# Patient Record
Sex: MALE | Race: BLACK OR AFRICAN AMERICAN | Employment: UNEMPLOYED | URBAN - METROPOLITAN AREA
[De-identification: names, ages, dates, MRNs, and addresses within clinical notes are randomized per-mention and may not be internally consistent; named-entity substitution may affect disease eponyms.]

---

## 2017-10-11 ENCOUNTER — HOSPITAL ENCOUNTER (EMERGENCY)
Age: 37
Discharge: ANOTHER HOSPITAL | End: 2017-10-13
Attending: EMERGENCY MEDICINE | Admitting: EMERGENCY MEDICINE
Payer: SELF-PAY

## 2017-10-11 DIAGNOSIS — F32.A DEPRESSION, UNSPECIFIED DEPRESSION TYPE: Primary | ICD-10-CM

## 2017-10-11 LAB
ALBUMIN SERPL-MCNC: 4 G/DL (ref 3.4–5)
ALBUMIN/GLOB SERPL: 0.9 {RATIO} (ref 0.8–1.7)
ALP SERPL-CCNC: 108 U/L (ref 45–117)
ALT SERPL-CCNC: 36 U/L (ref 16–61)
AMPHET UR QL SCN: NEGATIVE
ANION GAP SERPL CALC-SCNC: 8 MMOL/L (ref 3–18)
AST SERPL-CCNC: 12 U/L (ref 15–37)
BARBITURATES UR QL SCN: NEGATIVE
BASOPHILS # BLD: 0 K/UL (ref 0–0.1)
BASOPHILS NFR BLD: 0 % (ref 0–2)
BENZODIAZ UR QL: NEGATIVE
BILIRUB SERPL-MCNC: 0.7 MG/DL (ref 0.2–1)
BUN SERPL-MCNC: 15 MG/DL (ref 7–18)
BUN/CREAT SERPL: 19 (ref 12–20)
CALCIUM SERPL-MCNC: 8.9 MG/DL (ref 8.5–10.1)
CANNABINOIDS UR QL SCN: NEGATIVE
CHLORIDE SERPL-SCNC: 108 MMOL/L (ref 100–108)
CO2 SERPL-SCNC: 25 MMOL/L (ref 21–32)
COCAINE UR QL SCN: NEGATIVE
CREAT SERPL-MCNC: 0.79 MG/DL (ref 0.6–1.3)
DIFFERENTIAL METHOD BLD: ABNORMAL
EOSINOPHIL # BLD: 0 K/UL (ref 0–0.4)
EOSINOPHIL NFR BLD: 1 % (ref 0–5)
ERYTHROCYTE [DISTWIDTH] IN BLOOD BY AUTOMATED COUNT: 12.8 % (ref 11.6–14.5)
ETHANOL SERPL-MCNC: <3 MG/DL (ref 0–3)
GLOBULIN SER CALC-MCNC: 4.3 G/DL (ref 2–4)
GLUCOSE SERPL-MCNC: 87 MG/DL (ref 74–99)
HCT VFR BLD AUTO: 46.3 % (ref 36–48)
HDSCOM,HDSCOM: NORMAL
HGB BLD-MCNC: 16 G/DL (ref 13–16)
LYMPHOCYTES # BLD: 1.4 K/UL (ref 0.9–3.6)
LYMPHOCYTES NFR BLD: 19 % (ref 21–52)
MCH RBC QN AUTO: 28.5 PG (ref 24–34)
MCHC RBC AUTO-ENTMCNC: 34.6 G/DL (ref 31–37)
MCV RBC AUTO: 82.5 FL (ref 74–97)
METHADONE UR QL: NEGATIVE
MONOCYTES # BLD: 0.6 K/UL (ref 0.05–1.2)
MONOCYTES NFR BLD: 8 % (ref 3–10)
NEUTS SEG # BLD: 5.3 K/UL (ref 1.8–8)
NEUTS SEG NFR BLD: 72 % (ref 40–73)
OPIATES UR QL: NEGATIVE
PCP UR QL: NEGATIVE
PLATELET # BLD AUTO: 271 K/UL (ref 135–420)
PMV BLD AUTO: 9.6 FL (ref 9.2–11.8)
POTASSIUM SERPL-SCNC: 3.3 MMOL/L (ref 3.5–5.5)
PROT SERPL-MCNC: 8.3 G/DL (ref 6.4–8.2)
RBC # BLD AUTO: 5.61 M/UL (ref 4.7–5.5)
SODIUM SERPL-SCNC: 141 MMOL/L (ref 136–145)
WBC # BLD AUTO: 7.4 K/UL (ref 4.6–13.2)

## 2017-10-11 PROCEDURE — 80053 COMPREHEN METABOLIC PANEL: CPT | Performed by: EMERGENCY MEDICINE

## 2017-10-11 PROCEDURE — 80307 DRUG TEST PRSMV CHEM ANLYZR: CPT | Performed by: EMERGENCY MEDICINE

## 2017-10-11 PROCEDURE — 85025 COMPLETE CBC W/AUTO DIFF WBC: CPT | Performed by: EMERGENCY MEDICINE

## 2017-10-11 PROCEDURE — 99285 EMERGENCY DEPT VISIT HI MDM: CPT

## 2017-10-11 NOTE — BSMART NOTE
COMPREHENSIVE ASSESSMENT FORM PART 1    SECTION I - DISPOSITION  Patient was discussed with Dr. Devang Kidd while patient was in Bed 21 of the ER. Patient has been moved to ER Bed 17. SECTION II - INTEGRATED SUMMARY  CHIEF COMPLAINT:  Patient is a 30year old male who presents to the Emergency Room for a crisis evaluation. Patient complains of depression and suicidal ideations with a plan to overdose on his sister's medications. He states he has been battling depressive thoughts for several years. His thoughts evolve around life stressors. He lost his job in [place], [place], about four years ago. He and his wife and two children (2 year old and 18 month old) now live with his parents and his sister who suffers from a two (2) TBIs. Recently he lost his job at [employer] and also a  job. Additionally, patient states he has been [place] cheating on his wife by texting\" a woman (\"a friend of a friend of a friend\") using social media. His wife found out about this yesterday. Patient admits that he has \"mentally cheated\" on his wife in the past which caused his wife to leave him. He is upset because he feels his parents will berate him and they will \"rape me verbally for 45 minutes to an hour\" when they find out about this current situation. He states, \"I don't want to hear with they have to say right now. \"    Today he was discussing his concerns with his /counselors who felt he was in crisis. They in turn called the police to his home. Upon arrival, he was then transported to [facility] ER. SI / SELF HARM HX / HI:  He attempted suicide in the past by lay down in the middle of the road at his college campus, about 12 years ago. A passerby \"talked him out of it. \"  No HI. HALLUCINATIONS (AUDITORY/VISUAL/TACTILE/OLFACTORY):  Denies all. FAMILY HX OF MENTAL ILLNESS/SUBSTANCE ABUSE:  Sister has had two traumatic brain injuries and now suffers from an unknown mental illness.     PRIOR TREATMENT HX:  INPATIENT:   Denied. OUTPATIENT:   Denied. MEDICAL HISTORY:  Current medical concerns -  Denies having any medical issues. Current medications/Medication History:  Does not take any medication. [address]  Patient is dressed in hospital paper scrubs and slip resistant socks. His speech is rapid. He requires redirection to stay on topic. His/Her affect is calm and cooperative while responding to questions asked. He is alert and oriented to person, place, time and situation. **  Patient will be referred to the [facility] ([facility]) for further assessment and evaluation for a possible Crisis Stabilization Unit and/or other facility admission.       Arron Diaz, AYSE, BSN

## 2017-10-11 NOTE — ED PROVIDER NOTES
HPI Comments: 5:03 PM Aakash Baca is a 30 y.o. male who presents to the ED w/ police escort c/o SI that began today w/ a plan to overdose on a family member's prescription medications. The patient states that he emotionally cheated on his wife and that his wife and family found out. He states that his family was out of the house this afternoon and he was \"dreading their return\" because they were going to lecture him. He explains that he decided not to overdose because he sent a message to his  who talked him out of it and called the police. The patient denies a history of depression and other psychiatric disorders. He also denies HI, hallucinations, fever, chills, HA, abd pain, and additional complaints or concerns. The patient states that he voluntarily wants to be here and receive help. PCP:  Not On File Bsi      The history is provided by the patient. No past medical history on file. No past surgical history on file. No family history on file. Social History     Social History    Marital status:      Spouse name: N/A    Number of children: N/A    Years of education: N/A     Occupational History    Not on file. Social History Main Topics    Smoking status: Not on file    Smokeless tobacco: Not on file    Alcohol use Not on file    Drug use: Not on file    Sexual activity: Not on file     Other Topics Concern    Not on file     Social History Narrative         ALLERGIES: Review of patient's allergies indicates no known allergies. Review of Systems   Psychiatric/Behavioral: Positive for suicidal ideas. All other systems reviewed and are negative. Vitals:    10/11/17 1530 10/11/17 1536   BP:  (!) 146/93   Pulse:  89   Resp:  16   Temp:  98.1 °F (36.7 °C)   SpO2: 99% 99%            Physical Exam   Constitutional: He is oriented to person, place, and time. He appears well-developed. HENT:   Head: Normocephalic and atraumatic.    Eyes: Conjunctivae and EOM are normal.   Neck: Normal range of motion. Cardiovascular: Normal heart sounds. Exam reveals no gallop and no friction rub. No murmur heard. Pulmonary/Chest: Effort normal and breath sounds normal. No stridor. Abdominal: Soft. There is no tenderness. Musculoskeletal: Normal range of motion. He exhibits no tenderness. Neurological: He is alert and oriented to person, place, and time. Skin: Skin is warm and dry. He is not diaphoretic. Psychiatric: His speech is tangential (flight of ideas). He exhibits a depressed mood. He expresses suicidal ideation. Nursing note and vitals reviewed. MDM  Number of Diagnoses or Management Options  Depression, unspecified depression type:   Diagnosis management comments: I will medically clear the patient and contact crisis now that he is voluntary not and under an ECO. ED Course       Procedures    Vitals:  Patient Vitals for the past 12 hrs:   Temp Pulse Resp BP SpO2   10/11/17 1536 98.1 °F (36.7 °C) 89 16 (!) 146/93 99 %   10/11/17 1530 - - - - 99 %         Medications ordered:   Medications - No data to display      Lab findings:  Recent Results (from the past 12 hour(s))   CBC WITH AUTOMATED DIFF    Collection Time: 10/11/17  3:20 PM   Result Value Ref Range    WBC 7.4 4.6 - 13.2 K/uL    RBC 5.61 (H) 4.70 - 5.50 M/uL    HGB 16.0 13.0 - 16.0 g/dL    HCT 46.3 36.0 - 48.0 %    MCV 82.5 74.0 - 97.0 FL    MCH 28.5 24.0 - 34.0 PG    MCHC 34.6 31.0 - 37.0 g/dL    RDW 12.8 11.6 - 14.5 %    PLATELET 459 730 - 843 K/uL    MPV 9.6 9.2 - 11.8 FL    NEUTROPHILS 72 40 - 73 %    LYMPHOCYTES 19 (L) 21 - 52 %    MONOCYTES 8 3 - 10 %    EOSINOPHILS 1 0 - 5 %    BASOPHILS 0 0 - 2 %    ABS. NEUTROPHILS 5.3 1.8 - 8.0 K/UL    ABS. LYMPHOCYTES 1.4 0.9 - 3.6 K/UL    ABS. MONOCYTES 0.6 0.05 - 1.2 K/UL    ABS. EOSINOPHILS 0.0 0.0 - 0.4 K/UL    ABS.  BASOPHILS 0.0 0.0 - 0.1 K/UL    DF AUTOMATED     ETHYL ALCOHOL    Collection Time: 10/11/17  3:20 PM   Result Value Ref Range    ALCOHOL(ETHYL),SERUM <3 0 - 3 MG/DL   METABOLIC PANEL, COMPREHENSIVE    Collection Time: 10/11/17  3:20 PM   Result Value Ref Range    Sodium 141 136 - 145 mmol/L    Potassium 3.3 (L) 3.5 - 5.5 mmol/L    Chloride 108 100 - 108 mmol/L    CO2 25 21 - 32 mmol/L    Anion gap 8 3.0 - 18 mmol/L    Glucose 87 74 - 99 mg/dL    BUN 15 7.0 - 18 MG/DL    Creatinine 0.79 0.6 - 1.3 MG/DL    BUN/Creatinine ratio 19 12 - 20      GFR est AA >60 >60 ml/min/1.73m2    GFR est non-AA >60 >60 ml/min/1.73m2    Calcium 8.9 8.5 - 10.1 MG/DL    Bilirubin, total 0.7 0.2 - 1.0 MG/DL    ALT (SGPT) 36 16 - 61 U/L    AST (SGOT) 12 (L) 15 - 37 U/L    Alk. phosphatase 108 45 - 117 U/L    Protein, total 8.3 (H) 6.4 - 8.2 g/dL    Albumin 4.0 3.4 - 5.0 g/dL    Globulin 4.3 (H) 2.0 - 4.0 g/dL    A-G Ratio 0.9 0.8 - 1.7     DRUG SCREEN, URINE    Collection Time: 10/11/17  3:20 PM   Result Value Ref Range    BENZODIAZEPINES NEGATIVE  NEG      BARBITURATES NEGATIVE  NEG      THC (TH-CANNABINOL) NEGATIVE  NEG      OPIATES NEGATIVE  NEG      PCP(PHENCYCLIDINE) NEGATIVE  NEG      COCAINE NEGATIVE  NEG      AMPHETAMINES NEGATIVE  NEG      METHADONE NEGATIVE  NEG      HDSCOM (NOTE)        EKG interpretation by ED Physician:      X-Ray, CT or other radiology findings or impressions:  No orders to display       Progress notes, Consult notes or additional Procedure notes:   5:30 PM I spoke with Abdirahman Wilkinson from crisis she states that she will come evaluate the patient but she will likely have to refer the case to csb. Reevaluation of patient: 6:37 AM : Pt care transferred to Dr. Shubham Centeno  ,ED provider. History of patient complaint(s), available diagnostic reports and current treatment plan has been discussed thoroughly. Bedside rounding on patient occured : yes . Intended disposition of patient : {psychiatric facility  Pending diagnostics reports and/or labs reviewed      Disposition:  Diagnosis:   1.  Depression, unspecified depression type Disposition: Signed out to Dr. Amanda Kaur pending CSB recommendations    Follow-up Information     None           Patient's Medications    No medications on file         Scribe [id number] Mauricio Witt [facility] acting as a scribe for and in the presence of Garret Adams MD      October 11, 2017m at 5:36 PM       Provider Attestation:      I personally performed the services described in the documentation, reviewed the documentation, as recorded by the scribe in my presence, and it accurately and completely records my words and actions.  October 11, 2017 at 5:36 PM - Garret Adams MD

## 2017-10-11 NOTE — ED TRIAGE NOTES
Patient arrived via [facility]. Patient suicidal with plan. Patient denies HI or history of mental health issues.  Patient states he has been having family issues ie infidelity

## 2017-10-12 NOTE — ED NOTES
0700: I assumed care of this patient from Dr. Chelsey Hairston. Patient presented with suicidal ideation, was medically cleared and evaluated by psychiatry. Is awaiting transport to crisis stabilization at 9 AM under Dr. Emile Hoff    Per CSB patient will have a bed tomorrow morning on the 13th    It is now the end of my shift, I am still awaiting placement. Patient will be signed out to the oncoming physician Dr. Radha Siddiqi at 0571. Bed is planned to be available at 9 AM tomorrow    Disposition:    Pending      Portions of this chart were created with Dragon medical speech to text program.   Unrecognized errors may be present.

## 2017-10-12 NOTE — PROGRESS NOTES
conducted spiritual care audit of patient. Patient has requested a visit from a . 1953: When  arrived at Emergency Department,  was told it was a different patient. Travon Ayala MDiv.   Board Certified [employer] [phone]

## 2017-10-12 NOTE — ED NOTES
7:09 PM :Pt care assumed from Dr. Minda Pierce , ED provider. Pt complaint(s), current treatment plan, progression and available diagnostic results have been discussed thoroughly. Rounding occurred: yes  Intended Disposition: Transfer   Pending diagnostic reports and/or labs (please list): Transfer bed placement at 0900 tomorrow          Scribe Attestation:     Hemant Rosales acting as a scribe for and in the presence of Elzbieta Her MD      October 12, 2017 at 7:28 PM       Provider Attestation:     I personally performed the services described in the documentation, reviewed the documentation, as recorded by the scribe in my presence, and it accurately and completely records my words and actions.  October 12, 2017 at 7:28 PM - Elzbieta Her MD

## 2017-10-12 NOTE — ED NOTES
Spoke with .S. Banco, Pt will be going to them tomorrow at [address] [place] [address].    Dr Deborah Smith [phone]

## 2017-10-12 NOTE — ED NOTES
Bedside report given to Isra Charter, [facility]. All patient needs met at this time.

## 2017-10-12 NOTE — ED NOTES
Bedside report received from [facility].  Pt resting on stretcher,  NAD at this time, pt requesting lights off to keep sleeping at this time

## 2017-10-12 NOTE — ED NOTES
Per hailee ellis, pt has been accepted to [facility] crisis unit, Dr. Fermin Carbajal is accepting, [address] [place] [address], [phone], [facility] started

## 2017-10-12 NOTE — BSMART NOTE
Per Laura Arriaga with [address] [place] [address], a bed search is being conducted with area crisis stabilization units (CSUs) for this patient.         Sherry Vargas RN, BSN

## 2017-10-12 NOTE — ED NOTES
Received bedside report form AYSE Schrader pt alert and oriented at this time, pt states he came in because he was having issues at home and felt like he would hurt himself, pt originally an ECO, stated he realized he needed to stay and is now voluntary, no plan but feels if he returns home he may hurt himself, no prior hx of admission or psychiatric diagnosis.

## 2017-10-13 VITALS
HEART RATE: 68 BPM | TEMPERATURE: 97 F | WEIGHT: 200 LBS | SYSTOLIC BLOOD PRESSURE: 117 MMHG | DIASTOLIC BLOOD PRESSURE: 77 MMHG | OXYGEN SATURATION: 99 % | RESPIRATION RATE: 14 BRPM

## 2017-10-13 NOTE — ED NOTES
Bedside report received from Kathryn Hernández, [facility], PT resting in bed with eyes closed, RR 16 even unlabored NAD, safety intact, will continue to monitor

## 2017-10-18 ENCOUNTER — HOSPITAL ENCOUNTER (EMERGENCY)
Age: 37
Discharge: HOME | End: 2017-10-18
Attending: EMERGENCY MEDICINE
Payer: SELF-PAY

## 2017-10-18 VITALS
HEART RATE: 103 BPM | OXYGEN SATURATION: 99 % | WEIGHT: 206 LBS | BODY MASS INDEX: 28.84 KG/M2 | DIASTOLIC BLOOD PRESSURE: 90 MMHG | TEMPERATURE: 98.5 F | HEIGHT: 71 IN | RESPIRATION RATE: 18 BRPM | SYSTOLIC BLOOD PRESSURE: 141 MMHG

## 2017-10-18 DIAGNOSIS — K64.9 HEMORRHOIDS, UNSPECIFIED HEMORRHOID TYPE: Primary | ICD-10-CM

## 2017-10-18 DIAGNOSIS — K62.5 RECTAL BLEEDING: ICD-10-CM

## 2017-10-18 PROCEDURE — 99283 EMERGENCY DEPT VISIT LOW MDM: CPT

## 2017-10-18 RX ORDER — HYDROCORTISONE 25 MG/G
CREAM TOPICAL 4 TIMES DAILY
Qty: 30 G | Refills: 0 | Status: SHIPPED | OUTPATIENT
Start: 2017-10-18 | End: 2018-03-05

## 2017-10-18 RX ORDER — HYDROCODONE BITARTRATE AND ACETAMINOPHEN 5; 325 MG/1; MG/1
1 TABLET ORAL
Qty: 10 TAB | Refills: 0 | Status: SHIPPED | OUTPATIENT
Start: 2017-10-18 | End: 2017-12-11

## 2017-10-18 RX ORDER — ESCITALOPRAM OXALATE 10 MG/1
10 TABLET ORAL DAILY
COMMUNITY
End: 2018-03-05

## 2017-10-18 RX ORDER — IBUPROFEN 600 MG/1
600 TABLET ORAL
Qty: 20 TAB | Refills: 0 | Status: ON HOLD | OUTPATIENT
Start: 2017-10-18 | End: 2018-03-05

## 2017-10-18 NOTE — DISCHARGE INSTRUCTIONS
Outside[employer]in Activation    Thank you for requesting access to [employer]. Please follow the instructions below to securely access and download your online medical record. Outside[employer] allows you to send messages to your doctor, view your test results, renew your prescriptions, schedule appointments, and more. How Do I Sign Up? 1. In your internet browser, go to [email or web address]  2. Click on the First Time User? Click Here link in the Sign In box. You will be redirect to the New Member Sign Up page. 3. Enter your Outside[employer]in Access Code exactly as it appears below. You will not need to use this code after youve completed the sign-up process. If you do not sign up before the expiration date, you must request a new code. Outside[employer]in Access Code: [email or web address]  Expires: 2018  7:39 AM (This is the date your Outside[employer]in access code will )    4. Enter the last four digits of your Social Security Number (xxxx) and Date of Birth (mm/dd/yyyy) as indicated and click Submit. You will be taken to the next sign-up page. 5. Create a Outside.in ID. This will be your Outside.in login ID and cannot be changed, so think of one that is secure and easy to remember. 6. Create a Outside[employer]in password. You can change your password at any time. 7. Enter your Password Reset Question and Answer. This can be used at a later time if you forget your password. 8. Enter your e-mail address. You will receive e-mail notification when new information is available in [address]. 9. Click Sign Up. You can now view and download portions of your medical record. 10. Click the Download Summary menu link to download a portable copy of your medical information. Additional Information    If you have questions, please visit the Frequently Asked Questions section of the Outside.in website at [email or web address] Go Try It On. [email or web address] Remember, [employer] is NOT to be used for urgent needs. For medical emergencies, dial 911.     Use Metamucil daily for stool softening. Purchase a sitz bath and use as directed twice daily for soothing relief. Call Dr Mikey Nolasco today to arrange a follow up appointment.

## 2017-10-18 NOTE — ED NOTES
I have reviewed discharge instruction and prescriptions with patient and CSB . Patient verbalized understanding and has no further questions at this time. Education taught and patient verbalized understanding of education. Teach back method used. Patients pain decreased. Belongings given to patient. Patient discharged with CSB to CSB.

## 2017-10-18 NOTE — LETTER
NOTIFICATION RETURN TO WORK / SCHOOL 
 
10/18/2017 10:57 AM 
 
Mr. Carlos Walker [address] [place] [address] To Whom It May Concern: 
 
Carlos Walker is currently under the care of [facility] EMERGENCY DEPT. He will return to work/school on:    Pt is cleared to return to CSB. If there are questions or concerns please have the patient contact our office. Sincerely, 
 
 
 
Villa BAUM

## 2017-10-18 NOTE — ED TRIAGE NOTES
Pt is currently admitted to [facility]. Reports right lower abd pain that started approx 2 hours ago, mild nausea. Denies vomiting.

## 2017-10-18 NOTE — ED PROVIDER NOTES
HPI Comments: Margaret Floyd is a 30year old male who presents to the ED with a c/o rectal bleeding and abdominal pain. States that bleeding started two days ago, and only occurs when he has a bowel movement. Denies a Hx of hemorrhoids. Patient is a 30 y.o. male presenting with abdominal pain. The history is provided by the patient. History limited by: no communication barrier. Abdominal Pain    This is a new problem. The current episode started 2 days ago. The problem occurs constantly. The problem has not changed since onset. Associated with: Pt makes no association. The pain is moderate. Past Medical History:   Diagnosis Date    Depression        Past Surgical History:   Procedure Laterality Date    HX CHOLECYSTECTOMY           No family history on file. Social History     Social History    Marital status:      Spouse name: N/A    Number of children: N/A    Years of education: N/A     Occupational History    Not on file. Social History Main Topics    Smoking status: Never Smoker    Smokeless tobacco: Never Used    Alcohol use No    Drug use: Not on file    Sexual activity: Not on file     Other Topics Concern    Not on file     Social History Narrative    No narrative on file         ALLERGIES: Review of patient's allergies indicates no known allergies. Review of Systems   Constitutional: Negative. HENT: Negative. Eyes: Negative. Respiratory: Negative. Cardiovascular: Negative. Gastrointestinal: Positive for abdominal pain. Rectal bleeding     Endocrine: Negative. Genitourinary: Negative. Musculoskeletal: Negative. Skin: Negative. Allergic/Immunologic: Negative. Neurological: Negative. Hematological: Negative. Psychiatric/Behavioral: Negative.         Vitals:    10/18/17 0951   BP: 141/90   Pulse: (!) 103   Resp: 18   Temp: 98.5 °F (36.9 °C)   SpO2: 99%   Weight: 93.4 kg (206 lb)   Height: 5' 11\" (1.803 m) Physical Exam   Constitutional: He is oriented to person, place, and time. He appears well-developed and well-nourished. No distress. HENT:   Head: Normocephalic and atraumatic. Eyes: Pupils are equal, round, and reactive to light. Neck: Normal range of motion. Neck supple. Cardiovascular: Normal rate and regular rhythm. Pulmonary/Chest: Effort normal and breath sounds normal. He has no wheezes. He has no rales. Abdominal: Soft. Bowel sounds are normal. There is no tenderness. There is no rebound and no guarding. Genitourinary:   Genitourinary Comments: There are two significantly thrombosed hemorrhoids. Bleeding residual bleeding noted. TTP. Musculoskeletal: Normal range of motion. Neurological: He is alert and oriented to person, place, and time. No cranial nerve deficit. Coordination normal.   Skin: Skin is warm and dry. Psychiatric: He has a normal mood and affect. Nursing note and vitals reviewed. MDM  Number of Diagnoses or Management Options  Hemorrhoids, unspecified hemorrhoid type:   Rectal bleeding:   Diagnosis management comments: MDM:  Will offer the Pt pain medication and supportive treatment options as we as a referal to surgery. Deborah Matute NP  10:35 AM        ED Course       Procedures      Diagnosis:   1. Hemorrhoids, unspecified hemorrhoid type    2. Rectal bleeding          Disposition:   Discharged to Home. Follow-up Information     Follow up With Details Comments Contact Tianna Osborne MD Call today for an appointment. [id number] [address]            Patient's Medications   Start Taking    HYDROCODONE-ACETAMINOPHEN (NORCO) 5-325 MG PER TABLET    Take 1 Tab by mouth every four (4) hours as needed for Pain. Max Daily Amount: 6 Tabs. HYDROCORTISONE (ANUSOL-HC) 2.5 % RECTAL CREAM    Insert  into rectum four (4) times daily.     IBUPROFEN (MOTRIN) 600 MG TABLET    Take 1 Tab by mouth every six (6) hours as needed for Pain. Continue Taking    ESCITALOPRAM OXALATE (LEXAPRO) 10 MG TABLET    Take 10 mg by mouth daily.    These Medications have changed    No medications on file   Stop Taking    No medications on file

## 2017-11-09 ENCOUNTER — OFFICE VISIT (OUTPATIENT)
Dept: SURGERY | Age: 37
End: 2017-11-09

## 2017-11-09 VITALS
SYSTOLIC BLOOD PRESSURE: 110 MMHG | TEMPERATURE: 98.3 F | HEART RATE: 86 BPM | DIASTOLIC BLOOD PRESSURE: 80 MMHG | HEIGHT: 71 IN | BODY MASS INDEX: 28.7 KG/M2 | WEIGHT: 205 LBS

## 2017-11-09 DIAGNOSIS — K62.5 RECTAL BLEEDING: ICD-10-CM

## 2017-11-09 DIAGNOSIS — K64.0 GRADE I HEMORRHOIDS: ICD-10-CM

## 2017-11-09 DIAGNOSIS — R10.31 RIGHT LOWER QUADRANT ABDOMINAL PAIN: Primary | ICD-10-CM

## 2017-11-09 NOTE — PROGRESS NOTES
HPI: Deann Banegas is a 30 y.o. male presenting with chief complain of hemorrhoids. The patient states he had anorectal pain 3 weeks ago with a firm lump. He was also being seen in the emergency department for abdominal pain. He has blood with bowel movements. He sees in the bowl and on the floor. The discomfort is still mild. It has been helped with over-the-counter creams. He has right lower quadrant abdominal pain. CT imaging in the ED showed some enlarged lymph nodes in the right lower quadrant. There was suspicion raised about terminal ileal thickening. He states his bowel function is been inconsistent lately. And he notes some constipation as well. He has never had a colonoscopy. He has been taking MiraLAX for the last 10 days. He denies fecal incontinence in general, however he states he had one episode sometime ago where he had an incontinent episode due to poor planning. Past Medical History:   Diagnosis Date    Depression        Past Surgical History:   Procedure Laterality Date    HX CHOLECYSTECTOMY      HX ORTHOPAEDIC      scope right knee       [address] negative for colorectal cancer or disorders    Social History     Social History    Marital status:      Spouse name: N/A    Number of children: N/A    Years of education: N/A     Social History Main Topics    Smoking status: Never Smoker    Smokeless tobacco: Never Used    Alcohol use No    Drug use: None    Sexual activity: Not Asked     Other Topics Concern    None     Social History Narrative       Review of Systems - Review of Systems   Constitutional: Negative. HENT: Positive for congestion. Negative for ear discharge, ear pain, hearing loss, nosebleeds, sinus pain, sore throat and tinnitus. Eyes: Negative. Respiratory: Negative. Negative for stridor. Cardiovascular: Negative. Gastrointestinal: Positive for abdominal pain, blood in stool and constipation.  Negative for diarrhea, heartburn, melena, nausea and vomiting. Genitourinary: Negative. Musculoskeletal: Negative. Skin: Negative. Neurological: Negative. Endo/Heme/Allergies: Negative. Psychiatric/Behavioral: Positive for depression and suicidal ideas. Negative for hallucinations, memory loss and substance abuse. The patient is not nervous/anxious and does not have insomnia. Outpatient Prescriptions Marked as Taking for the 11/9/17 encounter (Office Visit) with Mario Alberto Felton MD   Medication Sig Dispense Refill    escitalopram oxalate (LEXAPRO) 10 mg tablet Take 10 mg by mouth daily.  ibuprofen (MOTRIN) 600 mg tablet Take 1 Tab by mouth every six (6) hours as needed for Pain. 20 Tab 0    hydrocortisone (ANUSOL-HC) 2.5 % rectal cream Insert  into rectum four (4) times daily. 30 g 0       No Known Allergies    Vitals:    11/09/17 0844   BP: 110/80   Pulse: 86   Temp: 98.3 °F (36.8 °C)   Weight: 93 kg (205 lb)   Height: 5' 11\" (1.803 m)   PainSc:   5   PainLoc: Rectum       Physical Exam   Constitutional: He appears well-developed and well-nourished. HENT:   Head: Normocephalic and atraumatic. Eyes: Conjunctivae and EOM are normal.   Abdominal: Soft. He exhibits no distension and no mass. There is tenderness (mild RLQ). There is no guarding. Musculoskeletal: Normal range of motion. Lymphadenopathy:     He has no cervical adenopathy. Right: No inguinal adenopathy present. Left: No inguinal adenopathy present. Neurological: He exhibits normal muscle tone. Skin: Skin is warm and dry. Psychiatric: He has a normal mood and affect.  His speech is normal.     CT report reviewed from [facility]; lymph nodes seen in the right lower quadrant; possible ileal thickening, difficult to evaluate given lack of contrast.    Assessment / Plan    Rectal bleeding and anorectal pain, possibly resolved thrombosed hemorrhoid versus grade 1 hemorrhoids  Recommend Citrucel daily  Return to office for anoscopy should colonoscopy be negative and bleeding continues    Rectal bleeding, CT abnormality with possible thickening of terminal ileum  Schedule colonoscopy to rule out ileitis and malignancy    The diagnoses and plan were discussed with the patient. All questions answered. Plan of care agreed to by all concerned.

## 2017-11-09 NOTE — MR AVS SNAPSHOT
Visit Information Date & Time Provider Department Dept. Phone Encounter #  
 11/9/2017  8:45 AM Rodney Gallegos MD [place] [id number] [phone] [id number] Upcoming Health Maintenance Date Due DTaP/Tdap/Td series (1 - Tdap) 7/10/2003 Influenza Age 5 to Adult 8/1/2017 Allergies as of 11/9/2017  Review Complete On: 11/9/2017 By: Rodney Gallegos MD  
 No Known Allergies Current Immunizations  Never Reviewed No immunizations on file. Not reviewed this visit You Were Diagnosed With   
  
 Codes Comments Right lower quadrant abdominal pain    -  Primary ICD-10-CM: R10.31 ICD-9-CM: 789.03 Rectal bleeding     ICD-10-CM: K62.5 ICD-9-CM: 569.3 Grade I hemorrhoids     ICD-10-CM: K64.0 ICD-9-CM: 455.0 Vitals BP Pulse Temp Height(growth percentile) Weight(growth percentile) BMI  
 110/80 86 98.3 °F (36.8 °C) 5' 11\" (1.803 m) 205 lb (93 kg) 28.59 kg/m2 Smoking Status Never Smoker BMI and BSA Data Body Mass Index Body Surface Area 28.59 kg/m 2 2.16 m 2 Your Updated Medication List  
  
   
This list is accurate as of: 11/9/17  9:15 AM.  Always use your most recent med list.  
  
  
  
  
 HYDROcodone-acetaminophen 5-325 mg per tablet Commonly known as:  Bond Marko Take 1 Tab by mouth every four (4) hours as needed for Pain. Max Daily Amount: 6 Tabs. hydrocortisone 2.5 % rectal cream  
Commonly known as:  ANUSOL-HC Insert  into rectum four (4) times daily. ibuprofen 600 mg tablet Commonly known as:  MOTRIN Take 1 Tab by mouth every six (6) hours as needed for Pain. LEXAPRO 10 mg tablet Generic drug:  escitalopram oxalate Take 10 mg by mouth daily. To-Do List   
 As directed GI:  COLONOSCOPY Patient Instructions Increase fiber in diet and start citrucel one adult dose daily with plenty fluids also schedule cn 
 
 
  
 Introducing [facility]! [facility] introduces [employer] patient portal. Now you can access parts of your medical record, email your doctor's office, and request medication refills online. 1. In your internet browser, go to [email or web address] 2. Click on the First Time User? Click Here link in the Sign In box. You will see the New Member Sign Up page. 3. Enter your [employer] Access Code exactly as it appears below. You will not need to use this code after youve completed the sign-up process. If you do not sign up before the expiration date, you must request a new code. · [employer] Access Code: [email or web address] Expires: 1/11/2018  6:39 AM 
 
4. Enter the last four digits of your Social Security Number (xxxx) and Date of Birth (mm/dd/yyyy) as indicated and click Submit. You will be taken to the next sign-up page. 5. Create a [employer] ID. This will be your [employer] login ID and cannot be changed, so think of one that is secure and easy to remember. 6. Create a [employer] password. You can change your password at any time. 7. Enter your Password Reset Question and Answer. This can be used at a later time if you forget your password. 8. Enter your e-mail address. You will receive e-mail notification when new information is available in [address] 9. Click Sign Up. You can now view and download portions of your medical record. 10. Click the Download Summary menu link to download a portable copy of your medical information. If you have questions, please visit the Frequently Asked Questions section of the [employer] website. Remember, [employer] is NOT to be used for urgent needs. For medical emergencies, dial 911. Now available from your iPhone and Android! Please provide this summary of care documentation to your next provider. Your primary care clinician is listed as PROVIDER UNKNOWN. If you have any questions after today's visit, please call [phone].

## 2017-12-13 ENCOUNTER — HOSPITAL ENCOUNTER (OUTPATIENT)
Age: 37
Setting detail: OUTPATIENT SURGERY
Discharge: HOME | End: 2017-12-13
Attending: COLON & RECTAL SURGERY | Admitting: COLON & RECTAL SURGERY
Payer: SELF-PAY

## 2017-12-13 VITALS
HEIGHT: 71 IN | BODY MASS INDEX: 30.1 KG/M2 | HEART RATE: 66 BPM | DIASTOLIC BLOOD PRESSURE: 56 MMHG | SYSTOLIC BLOOD PRESSURE: 109 MMHG | TEMPERATURE: 98.3 F | WEIGHT: 215 LBS | RESPIRATION RATE: 21 BRPM | OXYGEN SATURATION: 100 %

## 2017-12-13 PROCEDURE — 76040000007: Performed by: COLON & RECTAL SURGERY

## 2017-12-13 PROCEDURE — 74011250636 HC RX REV CODE- 250/636: Performed by: COLON & RECTAL SURGERY

## 2017-12-13 RX ORDER — EPINEPHRINE 0.1 MG/ML
1 INJECTION INTRACARDIAC; INTRAVENOUS
Status: DISCONTINUED | OUTPATIENT
Start: 2017-12-13 | End: 2017-12-13 | Stop reason: HOSPADM

## 2017-12-13 RX ORDER — FENTANYL CITRATE 50 UG/ML
50-200 INJECTION, SOLUTION INTRAMUSCULAR; INTRAVENOUS
Status: DISCONTINUED | OUTPATIENT
Start: 2017-12-13 | End: 2017-12-13 | Stop reason: HOSPADM

## 2017-12-13 RX ORDER — SODIUM CHLORIDE 0.9 % (FLUSH) 0.9 %
5-10 SYRINGE (ML) INJECTION AS NEEDED
Status: DISCONTINUED | OUTPATIENT
Start: 2017-12-13 | End: 2017-12-13 | Stop reason: HOSPADM

## 2017-12-13 RX ORDER — ATROPINE SULFATE 0.1 MG/ML
0.5 INJECTION INTRAVENOUS
Status: DISCONTINUED | OUTPATIENT
Start: 2017-12-13 | End: 2017-12-13 | Stop reason: HOSPADM

## 2017-12-13 RX ORDER — SODIUM CHLORIDE 9 MG/ML
75 INJECTION, SOLUTION INTRAVENOUS CONTINUOUS
Status: DISCONTINUED | OUTPATIENT
Start: 2017-12-13 | End: 2017-12-13 | Stop reason: HOSPADM

## 2017-12-13 RX ORDER — NALOXONE HYDROCHLORIDE 0.4 MG/ML
0.4 INJECTION, SOLUTION INTRAMUSCULAR; INTRAVENOUS; SUBCUTANEOUS
Status: DISCONTINUED | OUTPATIENT
Start: 2017-12-13 | End: 2017-12-13 | Stop reason: HOSPADM

## 2017-12-13 RX ORDER — MIDAZOLAM HYDROCHLORIDE 1 MG/ML
.25-5 INJECTION, SOLUTION INTRAMUSCULAR; INTRAVENOUS
Status: DISCONTINUED | OUTPATIENT
Start: 2017-12-13 | End: 2017-12-13 | Stop reason: HOSPADM

## 2017-12-13 RX ORDER — FLUMAZENIL 0.1 MG/ML
0.2 INJECTION INTRAVENOUS
Status: DISCONTINUED | OUTPATIENT
Start: 2017-12-13 | End: 2017-12-13 | Stop reason: HOSPADM

## 2017-12-13 RX ORDER — DEXTROMETHORPHAN/PSEUDOEPHED 2.5-7.5/.8
1.2 DROPS ORAL
Status: DISCONTINUED | OUTPATIENT
Start: 2017-12-13 | End: 2017-12-13 | Stop reason: HOSPADM

## 2017-12-13 RX ORDER — SODIUM CHLORIDE 0.9 % (FLUSH) 0.9 %
5-10 SYRINGE (ML) INJECTION EVERY 8 HOURS
Status: DISCONTINUED | OUTPATIENT
Start: 2017-12-13 | End: 2017-12-13 | Stop reason: HOSPADM

## 2017-12-13 RX ADMIN — SODIUM CHLORIDE 75 ML/HR: 900 INJECTION, SOLUTION INTRAVENOUS at 11:53

## 2017-12-13 NOTE — DISCHARGE INSTRUCTIONS
Return to office if bleeding persists for hemorrhoid treatment. Colonoscopy: What to Expect at [address] [place] [address]  After you have a colonoscopy, you will stay at the clinic for 1 to 2 hours until the medicines wear off. Then you can go home. But you will need to arrange for a ride. Your doctor will tell you when you can eat and do your other usual activities. Your doctor will talk to you about when you will need your next colonoscopy. Your doctor can help you decide how often you need to be checked. This will depend on the results of your test and your risk for colorectal cancer. After the test, you may be bloated or have gas pains. You may need to pass gas. If a biopsy was done or a polyp was removed, you may have streaks of blood in your stool (feces) for a few days. This care sheet gives you a general idea about how long it will take for you to recover. But each person recovers at a different pace. Follow the steps below to get better as quickly as possible. How can you care for yourself at home? Activity  · Rest when you feel tired. · You can do your normal activities when it feels okay to do so. Diet  · Follow your doctor's directions for eating. · Unless your doctor has told you not to, drink plenty of fluids. This helps to replace the fluids that were lost during the colon prep. · Do not drink alcohol. Medicines  · If polyps were removed or a biopsy was done during the test, your doctor may tell you not to take aspirin or other anti-inflammatory medicines for a few days. These include ibuprofen (Advil, Motrin) and naproxen (Aleve). Other instructions  · For your safety, do not drive or operate machinery until the medicine wears off and you can think clearly. Your doctor may tell you not to drive or operate machinery until the day after your test.  · Do not sign legal documents or make major decisions until the medicine wears off and you can think clearly.  The anesthesia can make it hard for you to fully understand what you are agreeing to. Follow-up care is a key part of your treatment and safety. Be sure to make and go to all appointments, and call your doctor if you are having problems. It's also a good idea to know your test results and keep a list of the medicines you take. When should you call for help? Call 911 anytime you think you may need emergency care. For example, call if:  · You passed out (lost consciousness). · You pass maroon or bloody stools. · You have severe belly pain. Call your doctor now or seek immediate medical care if:  · Your stools are black and tarlike. · Your stools have streaks of blood, but you did not have a biopsy or any polyps removed. · You have belly pain, or your belly is swollen and firm. · You vomit. · You have a fever. · You are very dizzy. Watch closely for changes in your health, and be sure to contact your doctor if you have any problems. Where can you learn more? Go to [employer]  Enter E264 in the search box to learn more about \"Colonoscopy: What to Expect at Home. \"   © 0364-8065 Healthwise, Inc[employer]. Care instructions adapted under license by [employer] (which disclaims liability or warranty for this information). This care instruction is for use with your licensed healthcare professional. If you have questions about a medical condition or this instruction, always ask your healthcare professional. [place] [address] any warranty or liability for your use of this information. Content Version: 42.2.073728; Current as of: November 14, 2014      DISCHARGE SUMMARY from Nurse     POST-PROCEDURE INSTRUCTIONS:    Call your Physician if you:  ? Observe any excess bleeding. ? Develop a temperature over 100.5o F.  ? Experience abdominal, shoulder or chest pain. ?  Notice any signs of decreased circulation or nerve impairment to an extremity such as a change in color, persistent numbness, tingling, coldness or increase in pain. ? Vomit blood or you have nausea and vomiting lasting longer than 4 hours. ? Are unable to take medications. ? Are unable to urinate within 8 hours after discharge following general anesthesia or intravenous sedation. For the next 24 hours after receiving general anesthesia or intravenous sedation, or while taking prescription Narcotics, limit your activities:  ? Do NOT drive a motor vehicle, operate hazard machinery or power tools, or perform tasks that require coordination. The medication you received during your procedure may have some effect on your mental awareness. ? Do NOT make important personal or business decisions. The medication you received during your procedure may have some effect on your mental awareness. ? Do NOT drink alcoholic beverages. These drinks do not mix well with the medications that have been given to you. ? Upon discharge from the hospital, you must be accompanied by a responsible adult. ? Resume your diet as directed by your physician. ? Resume medications as your physician has prescribed. ? Please give a list of your current medications to your Primary Care Provider. ? Please update this list whenever your medications are discontinued, doses are changed, or new medications (including over-the-counter products) are added. ? Please carry medication information at all times in case of emergency situations. These are general instructions for a healthy lifestyle:    No smoking/ No tobacco products/ Avoid exposure to second hand smoke.  Surgeon General's Warning:  Quitting smoking now greatly reduces serious risk to your health. Obesity, smoking, and a sedentary lifestyle greatly increase your risk for illness.    A healthy diet, regular physical exercise & weight monitoring are important for maintaining a healthy lifestyle   You may be retaining fluid if you have a history of heart failure or if you experience any of the following symptoms:  Weight gain of 3 pounds or more overnight or 5 pounds in a week, increased swelling in our hands or feet or shortness of breath while lying flat in bed. Please call your doctor as soon as you notice any of these symptoms; do not wait until your next office visit. Recognize signs and symptoms of STROKE:  F  -  Face looks uneven  A  -  Arms unable to move or move unevenly  S  -  Speech slurred or non-existent  T  -  Time to call 911 - as soon as signs and symptoms begin - DO NOT go back to bed or wait to see If you get better - TIME IS BRAIN. Colorectal Screening   Colorectal cancer almost always develops from precancerous polyps (abnormal growths) in the colon or rectum. Screening tests can find precancerous polyps, so that they can be removed before they turn into cancer. Screening tests can also find colorectal cancer early, when treatment works best.  [employer] Speak with your physician about when you should begin screening and how often you should be tested. Additional Information    If you have questions, please call [phone]. Remember, [employer] is NOT to be used for urgent needs. For medical emergencies, dial 911. APPOINTMENTS:    Please make a follow-up appointment with your physician. Discharge information has been reviewed with the patient and parent. The patient and parent verbalized understanding.

## 2017-12-13 NOTE — PROCEDURES
[facility]  [facility], [address]  [phone]                    Colonoscopy Procedure Note      Emile Cassette  1980  [id number]                Date of Procedure: 12/13/2017    Preoperative diagnosis: R10.31, Right lower quadrant abdominal pain  K62.5,  Rectal bleeding  Grade I hemorrhoids    Postoperative diagnosis: Normal Colon      :  Cathy Ureña MD    Assistant(s): Endoscopy Technician-1: Angie Mckee  Endoscopy RN-1: Sean Mazariegos RN  Endoscopy RN-2: Cathie Martinez RN    Sedation: Con Sed    Procedure Details:  Prior to the procedure, a history and physical were performed. The patients medications, allergies and sensitivities were reviewed and all questions were answered. After informed consent was obtained for the procedure, with all risks and benefits of procedure explained. The patient was taken to the endoscopy suite and placed in the left lateral decubitus position. Patient identification and proposed procedure were verified prior to the procedure by the nurse and I. Following sequential administration of sedation with fentanyl and versed by myself a digital rectal exam was performed and was normal.  The Olympus video colonoscope was introduced through the anus and advanced to terminal ileum. The quality of preparation was fair. The colonoscope was slowly withdrawn and the mucosa examined for any abnormalities. Cecal withdrawl time was greater than six minutes. The patient tolerated the procedure well. Findings/Interventions:   Polyps - none. Normal mucosal appearance of terminal ileum    EBL: none    Recommendations: Return to office if bleeding persists for hemorrhoidal treatment. High fiber diet. Resume normal medication(s).      Discharge Disposition:  Malka Horowitz MD  12/13/2017  1:02 PM

## 2017-12-13 NOTE — IP AVS SNAPSHOT
Harpreet Martel [address] [id number] [address] [address] [phone] Patient: Donny Camacho MRN: [id number] About your hospitalization You were admitted on:  December 13, 2017 You last received care in the:  [facility] You were discharged on:  December 13, 2017 Why you were hospitalized Your primary diagnosis was:  Not on File Things You Need To Do (next 8 weeks) Follow up with Sandra Mary MD  
  
Phone:  [phone] Where:  [address], [address] [facility] Follow up with Shala Fernando MD  
  
Phone:  [phone] Where:  [address], [address]  [address] Discharge Orders None A check paula indicates which time of day the medication should be taken. My Medications TAKE these medications as instructed Instructions Each Dose to Equal  
 Morning Noon Evening Bedtime  
 hydrocortisone 2.5 % rectal cream  
Commonly known as:  ANUSOL-HC Your last dose was: Your next dose is: Insert  into rectum four (4) times daily. ibuprofen 600 mg tablet Commonly known as:  MOTRIN Your last dose was: Your next dose is: Take 1 Tab by mouth every six (6) hours as needed for Pain. 600 mg  
    
   
   
   
  
 LEXAPRO 10 mg tablet Generic drug:  escitalopram oxalate Your last dose was: Your next dose is: Take 10 mg by mouth daily. 10 mg Discharge Instructions Return to office if bleeding persists for hemorrhoid treatment. Colonoscopy: What to Expect at [facility] Your Recovery After you have a colonoscopy, you will stay at the clinic for 1 to 2 hours until the medicines wear off. Then you can go home. But you will need to arrange for a ride.  Your doctor will tell you when you can eat and do your other usual activities. Your doctor will talk to you about when you will need your next colonoscopy. Your doctor can help you decide how often you need to be checked. This will depend on the results of your test and your risk for colorectal cancer. After the test, you may be bloated or have gas pains. You may need to pass gas. If a biopsy was done or a polyp was removed, you may have streaks of blood in your stool (feces) for a few days. This care sheet gives you a general idea about how long it will take for you to recover. But each person recovers at a different pace. Follow the steps below to get better as quickly as possible. How can you care for yourself at home? Activity · Rest when you feel tired. · You can do your normal activities when it feels okay to do so. Diet · Follow your doctor's directions for eating. · Unless your doctor has told you not to, drink plenty of fluids. This helps to replace the fluids that were lost during the colon prep. · Do not drink alcohol. Medicines · If polyps were removed or a biopsy was done during the test, your doctor may tell you not to take aspirin or other anti-inflammatory medicines for a few days. These include ibuprofen (Advil, Motrin) and naproxen (Aleve). Other instructions · For your safety, do not drive or operate machinery until the medicine wears off and you can think clearly. Your doctor may tell you not to drive or operate machinery until the day after your test. 
· Do not sign legal documents or make major decisions until the medicine wears off and you can think clearly. The anesthesia can make it hard for you to fully understand what you are agreeing to. Follow-up care is a key part of your treatment and safety. Be sure to make and go to all appointments, and call your doctor if you are having problems. It's also a good idea to know your test results and keep a list of the medicines you take. When should you call for help? Call 911 anytime you think you may need emergency care. For example, call if: 
· You passed out (lost consciousness). · You pass maroon or bloody stools. · You have severe belly pain. Call your doctor now or seek immediate medical care if: 
· Your stools are black and tarlike. · Your stools have streaks of blood, but you did not have a biopsy or any polyps removed. · You have belly pain, or your belly is swollen and firm. · You vomit. · You have a fever. · You are very dizzy. Watch closely for changes in your health, and be sure to contact your doctor if you have any problems. Where can you learn more? Go to Digital Message Display.be Enter E264 in the search box to learn more about \"Colonoscopy: What to Expect at Home. \"  
© 5690-9671 [employer]wise, [employer]. Care instructions adapted under license by [employer] (which disclaims liability or warranty for this information). This care instruction is for use with your licensed healthcare professional. If you have questions about a medical condition or this instruction, always ask your healthcare professional. [place] [address] any warranty or liability for your use of this information. Content Version: 04.8.117198; Current as of: November 14, 2014 DISCHARGE SUMMARY from Nurse POST-PROCEDURE INSTRUCTIONS: 
 
Call your Physician if you: 
? Observe any excess bleeding. ? Develop a temperature over 100.5o F. 
? Experience abdominal, shoulder or chest pain. ? Notice any signs of decreased circulation or nerve impairment to an extremity such as a change in color, persistent numbness, tingling, coldness or increase in pain. ? Vomit blood or you have nausea and vomiting lasting longer than 4 hours. ? Are unable to take medications. ? Are unable to urinate within 8 hours after discharge following general anesthesia or intravenous sedation.  
 
For the next 24 hours after receiving general anesthesia or intravenous sedation, or while taking prescription Narcotics, limit your activities: 
? Do NOT drive a motor vehicle, operate hazard machinery or power tools, or perform tasks that require coordination. The medication you received during your procedure may have some effect on your mental awareness. ? Do NOT make important personal or business decisions. The medication you received during your procedure may have some effect on your mental awareness. ? Do NOT drink alcoholic beverages. These drinks do not mix well with the medications that have been given to you. ? Upon discharge from the hospital, you must be accompanied by a responsible adult. ? Resume your diet as directed by your physician. ? Resume medications as your physician has prescribed. ? Please give a list of your current medications to your Primary Care Provider. ? Please update this list whenever your medications are discontinued, doses are changed, or new medications (including over-the-counter products) are added. ? Please carry medication information at all times in case of emergency situations. These are general instructions for a healthy lifestyle: No smoking/ No tobacco products/ Avoid exposure to second hand smoke. ? Surgeon General's Warning:  Quitting smoking now greatly reduces serious risk to your health. Obesity, smoking, and a sedentary lifestyle greatly increase your risk for illness. ? A healthy diet, regular physical exercise & weight monitoring are important for maintaining a healthy lifestyle ? You may be retaining fluid if you have a history of heart failure or if you experience any of the following symptoms:  Weight gain of 3 pounds or more overnight or 5 pounds in a week, increased swelling in our hands or feet or shortness of breath while lying flat in bed. Please call your doctor as soon as you notice any of these symptoms; do not wait until your next office visit.  
 
Recognize signs and symptoms of STROKE: 
 F  -  Face looks uneven A  -  Arms unable to move or move unevenly S  -  Speech slurred or non-existent T  -  Time to call 911 - as soon as signs and symptoms begin - DO NOT go back to bed or wait to see If you get better - TIME IS BRAIN. Colorectal Screening ? Colorectal cancer almost always develops from precancerous polyps (abnormal growths) in the colon or rectum. Screening tests can find precancerous polyps, so that they can be removed before they turn into cancer. Screening tests can also find colorectal cancer early, when treatment works best. 
? Speak with your physician about when you should begin screening and how often you should be tested. Additional Information If you have questions, please call [phone]. Remember, [employer] is NOT to be used for urgent needs. For medical emergencies, dial 911. APPOINTMENTS: 
 
Please make a follow-up appointment with your physician. Discharge information has been reviewed with the patient and parent. The patient and parent verbalized understanding. Introducing [facility] & [facility] SERVICES! [employer] introduces [employer] patient portal. Now you can access parts of your medical record, email your doctor's office, and request medication refills online. 1. In your internet browser, go to [email or web address] 2. Click on the First Time User? Click Here link in the Sign In box. You will see the New Member Sign Up page. 3. Enter your [employer] Access Code exactly as it appears below. You will not need to use this code after youve completed the sign-up process. If you do not sign up before the expiration date, you must request a new code. · [employer] Access Code: [email or web address] Expires: 1/11/2018  6:39 AM 
 
4. Enter the last four digits of your Social Security Number (xxxx) and Date of Birth (mm/dd/yyyy) as indicated and click Submit. You will be taken to the next sign-up page. 5. Create a [employer] ID. This will be your [employer] login ID and cannot be changed, so think of one that is secure and easy to remember. 6. Create a [employer] password. You can change your password at any time. 7. Enter your Password Reset Question and Answer. This can be used at a later time if you forget your password. 8. Enter your e-mail address. You will receive e-mail notification when new information is available in [address] 9. Click Sign Up. You can now view and download portions of your medical record. 10. Click the Download Summary menu link to download a portable copy of your medical information. If you have questions, please visit the Frequently Asked Questions section of the [employer] website. Remember, [employer] is NOT to be used for urgent needs. For medical emergencies, dial 911. Now available from your iPhone and Android! Providers Seen During Your Hospitalization Provider Specialty Primary office phone Mario Alberto Felton MD Colon and Rectal Surgery [phone] Your Primary Care Physician (PCP) Primary Care Physician Office Phone Office Fax Myra Huffman [phone] You are allergic to the following No active allergies Recent Documentation Height Weight BMI Smoking Status 1.803 m 97.5 kg 29.99 kg/m2 Never Smoker Emergency Contacts Name Discharge Info Relation Home Work Mobile [address] [facility] CAREGIVER [3] Spouse [3] [phone] Patient Belongings The following personal items are in your possession at time of discharge: 
  Dental Appliances: None  Visual Aid: Glasses, With patient Please provide this summary of care documentation to your next provider. Signatures-by signing, you are acknowledging that this After Visit Summary has been reviewed with you and you have received a copy.   
  
 
  
    
    
 Patient Signature: ____________________________________________________________ Date:  ____________________________________________________________  
  
Jeri Chambers Provider Signature:  ____________________________________________________________ Date:  ____________________________________________________________

## 2017-12-13 NOTE — H&P
HPI: Natalie Kennedy is a 30 y.o. male presenting with chief complain of rectal bleeding and CT abnormality of TI thickening. Past Medical History:   Diagnosis Date    Depression        Past Surgical History:   Procedure Laterality Date    HX CHOLECYSTECTOMY      HX ORTHOPAEDIC      scope right knee       History reviewed. No pertinent family history. Social History     Social History    Marital status:      Spouse name: N/A    Number of children: N/A    Years of education: N/A     Social History Main Topics    Smoking status: Never Smoker    Smokeless tobacco: Never Used    Alcohol use No    Drug use: None    Sexual activity: Not Asked     Other Topics Concern    None     Social History Narrative       Review of Systems - negative    Outpatient Prescriptions Marked as Taking for the 12/13/17 encounter [facility] Encounter)   Medication Sig Dispense Refill    escitalopram oxalate (LEXAPRO) 10 mg tablet Take 10 mg by mouth daily.  ibuprofen (MOTRIN) 600 mg tablet Take 1 Tab by mouth every six (6) hours as needed for Pain. 20 Tab 0    hydrocortisone (ANUSOL-HC) 2.5 % rectal cream Insert  into rectum four (4) times daily. 30 g 0       No Known Allergies    Vitals:    12/11/17 1053   Weight: 95.3 kg (210 lb)   Height: 5' 11\" (1.803 m)       Physical Exam   Constitutional: He appears well-developed and well-nourished. HENT:   Head: Normocephalic and atraumatic. Eyes: Conjunctivae and EOM are normal.   Abdominal: Soft. He exhibits no distension and no mass. There is no tenderness. Musculoskeletal: Normal range of motion. Lymphadenopathy:     He has no cervical adenopathy. Right: No inguinal adenopathy present. Left: No inguinal adenopathy present. Neurological: He exhibits normal muscle tone. Skin: Skin is warm and dry. Psychiatric: He has a normal mood and affect.  His speech is normal.       Assessment / Plan    colonoscopy    The diagnoses and plan were discussed with the patient. All questions answered. Plan of care agreed to by all concerned.

## 2018-03-03 ENCOUNTER — HOSPITAL ENCOUNTER (INPATIENT)
Age: 38
LOS: 1 days | Discharge: PSYCHIATRIC OR SUBSTANCE USE FACILITY | DRG: 918 | End: 2018-03-05
Attending: EMERGENCY MEDICINE | Admitting: INTERNAL MEDICINE
Payer: SELF-PAY

## 2018-03-03 DIAGNOSIS — T43.292A BUPROPION OVERDOSE, INTENTIONAL SELF-HARM, INITIAL ENCOUNTER (HCC): Primary | ICD-10-CM

## 2018-03-03 PROCEDURE — 99284 EMERGENCY DEPT VISIT MOD MDM: CPT

## 2018-03-03 NOTE — Clinical Note
Status[de-identified] Inpatient [101] Type of Bed: Telemetry [19] Inpatient Hospitalization Certified Necessary for the Following Reasons: 9. Other (further clarification in H&P documentation) Admitting Diagnosis: Bupropion overdose, intentional self-harm, initial encounter [facility]) [[id number]] Admitting Physician: Demetra Che Attending Physician: Demetra Che Estimated Length of Stay: 2 Midnights Discharge Plan[de-identified] Home with Office Follow-up

## 2018-03-03 NOTE — IP AVS SNAPSHOT
Summary of Care Report The Summary of Care report has been created to help improve care coordination. Users with access to [employer] or [facility] (Web-based application) may access additional patient information including the Discharge Summary. If you are not currently a [facility] user and need more information, please call the number listed below in the [place] [address] section and ask to be connected with Medical Records. Facility Information Name Address Phone [facility] [address] [facility] [address] [phone] Patient Information Patient Name Sex  Joana Neighbours ([id number]) Male 1980 Discharge Information Admitting Provider Service Area Unit Ernestine Moreno MD / [place] [address] / [phone] Discharge Provider Discharge Date/Time Discharge Disposition Destination (none) 3/5/2018 (Pending) [facility] (none) Patient Language Language ENGLISH [13] Hospital Problems as of 3/5/2018  Reviewed: 3/4/2018  2:44 AM by Ernestine Moreno MD  
  
  
  
 Class Noted - Resolved Last Modified POA Active Problems * (Principal)Bupropion overdose  3/4/2018 - Present 3/4/2018 by Ernestine Moreno MD Yes Entered by Alex Welch MD  
  Suicidal ideation  3/4/2018 - Present 3/4/2018 by Ernestine Moreno MD Unknown Entered by Ernestine Moreno MD  
  Depression  3/4/2018 - Present 3/4/2018 by Ernestine Moreno MD Unknown Entered by Ernestine Moreno MD  
  
Non-Hospital Problems as of 3/5/2018  Reviewed: 3/4/2018  2:44 AM by Ernestine Moreno MD  
 None You are allergic to the following No active allergies Current Discharge Medication List  
  
CONTINUE these medications which have NOT CHANGED Dose & Instructions Dispensing Information Comments ibuprofen 600 mg tablet Commonly known as:  MOTRIN Dose:  600 mg Take 1 Tab by mouth every six (6) hours as needed for Pain. Quantity:  20 Tab Refills:  0 STOP taking these medications Comments  
 hydrocortisone 2.5 % rectal cream  
Commonly known as:  ANUSOL-HC  
   
   
 LEXAPRO 10 mg tablet Generic drug:  escitalopram oxalate Follow-up Information Follow up With Details Comments Contact Info Iqra Baker MD On 3/14/2018 @11:00AM [address] [facility] [address] 
[phone] Discharge Instructions Discharge Instructions Patient: Martín Sims MRN: [id number]  CSN: [id number] YOB: 1980  Age: 30 y.o. Sex: male DOA: 3/3/2018 LOS:  LOS: 1 day   Discharge Date: DIET:  Regular Diet ACTIVITY: Activity as tolerated, no restrictions ADDITIONAL INFORMATION: If you experience any of the following symptoms but not limited to Fever, chills, nausea, vomiting, diarrhea, change in mentation, falling, bleeding, shortness of breath, chest pain, please call your primary care physician or return to the emergency room if you cannot get hold of your doctor:  
 
FOLLOW UP CARE: 
Dr. Iqra Baker MD in 7  Days upon discharge from behavioral medicince Juan Carlos Amado NP 
3/5/2018 5:02 PM 
  
Alcohol, Drug, or Poison Ingestion: Care Instructions Your Care Instructions A person can become very sick, or die, from swallowing or using alcohol, drugs, or poisons. Alcohol poisoning occurs when a person drinks a large amount of alcohol. Alcohol can stop nerve signals that control breathing. It can also stop the gag reflex that prevents choking. Alcohol poisoning is serious. It can lead to brain damage or death if it's not treated right away. Drugs can be used by accident or on purpose. They can be swallowed, inhaled, injected, or absorbed through the skin.  Drugs include over-the-counter medicine (such as aspirin or acetaminophen) and prescription medicine. They also include vitamins and supplements. And they include illegal drugs such as cocaine and heroin. And poisons are all around us. They include household , cosmetics, houseplants, and garden chemicals. The doctor has checked you carefully, but problems can develop later. If you notice any problems or new symptoms, get medical treatment right away. Follow-up care is a key part of your treatment and safety. Be sure to make and go to all appointments, and call your doctor if you are having problems. It's also a good idea to know your test results and keep a list of the medicines you take. How can you care for yourself at home? Alcohol problems · Talk to your doctor or counselor about programs that can help you stop using alcohol. · Plan ways to avoid being tempted to drink. ¨ Get rid of all alcohol in your home. ¨ Avoid places where you tend to drink. ¨ Stay away from places or events that offer alcohol. ¨ Stay away from people who drink a lot. Drug problems · Talk to your doctor about programs that can help you stop using drugs. · Get rid of any drugs you might be tempted to misuse. · Learn how to say no when other people use drugs. · Don't spend time with people who use drugs. Poison prevention · Keep products in the containers they came in. Keep them with the original labels. · Be careful when you use cleaning products, paints, solvents, and pesticides. Read labels before use. Use a fan to move strong odors and fumes out of your home. · Do not mix cleaning products. Try to use nontoxic . These include vinegar, lemon juice, and baking soda. When should you call for help? Poison control centers, hospitals, or your doctor can give immediate advice in the case of a poisoning. The [facility] [employer] number is [phone].  Have the poison container with you so you can give complete information to the poison control center, such as what the poison or substance is, how much was taken and when. Do not try to make the person vomit. ?Call 911 anytime you think you may need emergency care. For example, call if you or someone else: 
? · Has used or currently uses alcohol or drugs and is very confused or can't stay awake. ? · Has passed out (lost consciousness). ? · Has severe trouble breathing. ? · Is having a seizure. ?Call your doctor now or seek immediate medical care if you or someone else: 
? · Has new symptoms, or is not acting normally. ? Watch closely for changes in your health, and be sure to contact your doctor if: 
? · You do not get better as expected. ? · You need help with drug or alcohol problems. ? · You have problems with depression or other mental health issues. Where can you learn more? Go to http://juliet-elizabeth.info/. Enter W255 in the search box to learn more about \"Alcohol, Drug, or Poison Ingestion: Care Instructions. \" Current as of: March 20, 2017 Content Version: 11.4 © 5805-4757 [employer]. Care instructions adapted under license by [employer] (which disclaims liability or warranty for this information). If you have questions about a medical condition or this instruction, always ask your healthcare professional. [address] any warranty or liability for your use of this information. [employer] Activation Thank you for requesting access to [employer]. Please follow the instructions below to securely access and download your online medical record. [employer] allows you to send messages to your doctor, view your test results, renew your prescriptions, schedule appointments, and more. How Do I Sign Up? 1. In your internet browser, go to [email or web address] 
2. Click on the First Time User? Click Here link in the Sign In box.  You will be redirect to the New Member Sign Up page. 3. Enter your [employer] Access Code exactly as it appears below. You will not need to use this code after youve completed the sign-up process. If you do not sign up before the expiration date, you must request a new code. [employer] Access Code: [email or web address] Expires: 6/3/2018  4:55 PM (This is the date your [employer] access code will ) 4. Enter the last four digits of your Social Security Number (xxxx) and Date of Birth (mm/dd/yyyy) as indicated and click Submit. You will be taken to the next sign-up page. 5. Create a [email or web address] ID. This will be your [email or web address] login ID and cannot be changed, so think of one that is secure and easy to remember. 6. Create a [employer] password. You can change your password at any time. 7. Enter your Password Reset Question and Answer. This can be used at a later time if you forget your password. 8. Enter your e-mail address. You will receive e-mail notification when new information is available in [address] 9. Click Sign Up. You can now view and download portions of your medical record. 10. Click the Download Summary menu link to download a portable copy of your medical information. Additional Information If you have questions, please visit the Frequently Asked Questions section of the [email or web address] website at [email or web address] Remember, [email or web address] is NOT to be used for urgent needs. For medical emergencies, dial 911. Patient armband removed and shredded DISCHARGE SUMMARY from Nurse PATIENT INSTRUCTIONS: 
 
 
F-face looks uneven A-arms unable to move or move unevenly S-speech slurred or non-existent T-time-call 911 as soon as signs and symptoms begin-DO NOT go Back to bed or wait to see if you get better-TIME IS BRAIN. Warning Signs of HEART ATTACK Call 911 if you have these symptoms: 
? Chest discomfort. Most heart attacks involve discomfort in the center of the chest that lasts more than a few minutes, or that goes away and comes back. It can feel like uncomfortable pressure, squeezing, fullness, or pain. ? Discomfort in other areas of the upper body. Symptoms can include pain or discomfort in one or both arms, the back, neck, jaw, or stomach. ? Shortness of breath with or without chest discomfort. ? Other signs may include breaking out in a cold sweat, nausea, or lightheadedness. Don't wait more than five minutes to call [address]! Fast action can save your life. Calling 911 is almost always the fastest way to get lifesaving treatment. Emergency Medical Services staff can begin treatment when they arrive  up to an hour sooner than if someone gets to the hospital by car. The discharge information has been reviewed with the patient. The patient verbalized understanding. Discharge medications reviewed with the patient and appropriate educational materials and side effects teaching were provided. ___________________________________________________________________________________________________________________________________ Chart Review Routing History No Routing History on File

## 2018-03-03 NOTE — IP AVS SNAPSHOT
[address] [facility] Patient: Jeannine Jackson MRN: [id number] TDF:0[birth year] A check paula indicates which time of day the medication should be taken. My Medications CONTINUE taking these medications Instructions Each Dose to Equal  
 Morning Noon Evening Bedtime  
 ibuprofen 600 mg tablet Commonly known as:  MOTRIN Your last dose was: Your next dose is: Take 1 Tab by mouth every six (6) hours as needed for Pain. 600 mg  
    
   
   
   
  
  
STOP taking these medications   
 hydrocortisone 2.5 % rectal cream  
Commonly known as:  ANUSOL-HC  
   
  
 LEXAPRO 10 mg tablet Generic drug:  escitalopram oxalate

## 2018-03-03 NOTE — IP AVS SNAPSHOT
[id number] [address] Patient: Jose G Garcia MRN: [id number] EIH:[id number] About your hospitalization You were admitted on:  March 4, 2018 You last received care in the:  [facility] [address] You were discharged on:  March 5, 2018 Why you were hospitalized Your primary diagnosis was: Bupropion Overdose Your diagnoses also included:  Suicidal Ideation, Depression Follow-up Information Follow up With Details Comments Contact Info Gary Alexis MD On 3/14/2018 @11:00AM [address] [facility] [address] 
[phone] Discharge Orders None A check paula indicates which time of day the medication should be taken. My Medications CONTINUE taking these medications Instructions Each Dose to Equal  
 Morning Noon Evening Bedtime  
 ibuprofen 600 mg tablet Commonly known as:  MOTRIN Your last dose was: Your next dose is: Take 1 Tab by mouth every six (6) hours as needed for Pain. 600 mg  
    
   
   
   
  
  
STOP taking these medications   
 hydrocortisone 2.5 % rectal cream  
Commonly known as:  ANUSOL-HC  
   
  
 LEXAPRO 10 mg tablet Generic drug:  escitalopram oxalate Discharge Instructions Discharge Instructions Patient: Jose G Garcia MRN: [id number]  [facility]: [id number] YOB: 1980  Age: 30 y.o. Sex: male DOA: 3/3/2018 LOS:  LOS: 1 day   Discharge Date: DIET:  Regular Diet ACTIVITY: Activity as tolerated, no restrictions ADDITIONAL INFORMATION: If you experience any of the following symptoms but not limited to Fever, chills, nausea, vomiting, diarrhea, change in mentation, falling, bleeding, shortness of breath, chest pain, please call your primary care physician or return to the emergency room if you cannot get hold of your doctor:  
 
FOLLOW UP CARE: 
 Dr. Dakotah Julien MD in 7  Days upon discharge from behavioral medicince Blake Fritz NP 
3/5/2018 5:02 PM 
  
Alcohol, Drug, or Poison Ingestion: Care Instructions Your Care Instructions A person can become very sick, or die, from swallowing or using alcohol, drugs, or poisons. Alcohol poisoning occurs when a person drinks a large amount of alcohol. Alcohol can stop nerve signals that control breathing. It can also stop the gag reflex that prevents choking. Alcohol poisoning is serious. It can lead to brain damage or death if it's not treated right away. Drugs can be used by accident or on purpose. They can be swallowed, inhaled, injected, or absorbed through the skin. Drugs include over-the-counter medicine (such as aspirin or acetaminophen) and prescription medicine. They also include vitamins and supplements. And they include illegal drugs such as cocaine and heroin. And poisons are all around us. They include household , cosmetics, houseplants, and garden chemicals. The doctor has checked you carefully, but problems can develop later. If you notice any problems or new symptoms, get medical treatment right away. Follow-up care is a key part of your treatment and safety. Be sure to make and go to all appointments, and call your doctor if you are having problems. It's also a good idea to know your test results and keep a list of the medicines you take. How can you care for yourself at home? Alcohol problems · Talk to your doctor or counselor about programs that can help you stop using alcohol. · Plan ways to avoid being tempted to drink. ¨ Get rid of all alcohol in your home. ¨ Avoid places where you tend to drink. ¨ Stay away from places or events that offer alcohol. ¨ Stay away from people who drink a lot. Drug problems · Talk to your doctor about programs that can help you stop using drugs. · Get rid of any drugs you might be tempted to misuse. · Learn how to say no when other people use drugs. · Don't spend time with people who use drugs. Poison prevention · Keep products in the containers they came in. Keep them with the original labels. · Be careful when you use cleaning products, paints, solvents, and pesticides. Read labels before use. Use a fan to move strong odors and fumes out of your home. · Do not mix cleaning products. Try to use nontoxic . These include vinegar, lemon juice, and baking soda. When should you call for help? Poison control centers, hospitals, or your doctor can give immediate advice in the case of a poisoning. The [employer] number is [phone]. Have the poison container with you so you can give complete information to the poison control center, such as what the poison or substance is, how much was taken and when. Do not try to make the person vomit. ?Call 911 anytime you think you may need emergency care. For example, call if you or someone else: 
? · Has used or currently uses alcohol or drugs and is very confused or can't stay awake. ? · Has passed out (lost consciousness). ? · Has severe trouble breathing. ? · Is having a seizure. ?Call your doctor now or seek immediate medical care if you or someone else: 
? · Has new symptoms, or is not acting normally. ? Watch closely for changes in your health, and be sure to contact your doctor if: 
? · You do not get better as expected. ? · You need help with drug or alcohol problems. ? · You have problems with depression or other mental health issues. Where can you learn more? Go to http://juliet-elizabeth.info/. Enter J921 in the search box to learn more about \"Alcohol, Drug, or Poison Ingestion: Care Instructions. \" Current as of: March 20, 2017 Content Version: 11.4 © 7997-5049 Healthwise, Incorporated.  Care instructions adapted under license by [address] (which disclaims liability or warranty for this information). If you have questions about a medical condition or this instruction, always ask your healthcare professional. [address] any warranty or liability for your use of this information. Retina Implant Activation Thank you for requesting access to Retina Implant. Please follow the instructions below to securely access and download your online medical record. Retina Implant allows you to send messages to your doctor, view your test results, renew your prescriptions, schedule appointments, and more. How Do I Sign Up? 1. In your internet browser, go to [email or web address] 
2. Click on the First Time User? Click Here link in the Sign In box. You will be redirect to the New Member Sign Up page. 3. Enter your Retina Implant Access Code exactly as it appears below. You will not need to use this code after youve completed the sign-up process. If you do not sign up before the expiration date, you must request a new code. Retina Implant Access Code: [email or web address] Expires: 6/3/2018  4:55 PM (This is the date your Retina Implant access code will ) 4. Enter the last four digits of your Social Security Number (xxxx) and Date of Birth (mm/dd/yyyy) as indicated and click Submit. You will be taken to the next sign-up page. 5. Create a Retina Implant ID. This will be your Retina Implant login ID and cannot be changed, so think of one that is secure and easy to remember. 6. Create a Retina Implant password. You can change your password at any time. 7. Enter your Password Reset Question and Answer. This can be used at a later time if you forget your password. 8. Enter your e-mail address. You will receive e-mail notification when new information is available in [address] 9. Click Sign Up. You can now view and download portions of your medical record. 10. Click the Download Summary menu link to download a portable copy of your medical information. Additional Information If you have questions, please visit the Frequently Asked Questions section of the [employer] website at [email or web address] Remember, MyChart is NOT to be used for urgent needs. For medical emergencies, dial 911. Patient armband removed and shredded DISCHARGE SUMMARY from Nurse PATIENT INSTRUCTIONS: 
 
 
F-face looks uneven A-arms unable to move or move unevenly S-speech slurred or non-existent T-time-call 911 as soon as signs and symptoms begin-DO NOT go Back to bed or wait to see if you get better-TIME IS BRAIN. Warning Signs of HEART ATTACK Call 911 if you have these symptoms: 
? Chest discomfort. Most heart attacks involve discomfort in the center of the chest that lasts more than a few minutes, or that goes away and comes back. It can feel like uncomfortable pressure, squeezing, fullness, or pain. ? Discomfort in other areas of the upper body. Symptoms can include pain or discomfort in one or both arms, the back, neck, jaw, or stomach. ? Shortness of breath with or without chest discomfort. ? Other signs may include breaking out in a cold sweat, nausea, or lightheadedness. Don't wait more than five minutes to call [address]! Fast action can save your life. Calling 911 is almost always the fastest way to get lifesaving treatment. Emergency Medical Services staff can begin treatment when they arrive  up to an hour sooner than if someone gets to the hospital by car. The discharge information has been reviewed with the patient. The patient verbalized understanding.  
Discharge medications reviewed with the patient and appropriate educational materials and side effects teaching were provided. ___________________________________________________________________________________________________________________________________ MyChart Announcement We are excited to announce that we are making your provider's discharge notes available to you in Bebo. You will see these notes when they are completed and signed by the physician that discharged you from your recent hospital stay. If you have any questions or concerns about any information you see in [employer], please call the Health Information Department where you were seen or reach out to your Primary Care Provider for more information about your plan of care. Introducing [facility] [place] [facility]! Layla Torrez introduces Bebo patient portal. Now you can access parts of your medical record, email your doctor's office, and request medication refills online. 1. In your internet browser, go to [email or web address] 2. Click on the First Time User? Click Here link in the Sign In box. You will see the New Member Sign Up page. 3. Enter your Bebo Access Code exactly as it appears below. You will not need to use this code after youve completed the sign-up process. If you do not sign up before the expiration date, you must request a new code. · Bebo Access Code: [email or web address] Expires: 6/3/2018  4:55 PM 
 
4. Enter the last four digits of your Social Security Number (xxxx) and Date of Birth (mm/dd/yyyy) as indicated and click Submit. You will be taken to the next sign-up page. 5. Create a Bebo ID. This will be your Bebo login ID and cannot be changed, so think of one that is secure and easy to remember. 6. Create a Bebo password. You can change your password at any time. 7. Enter your Password Reset Question and Answer. This can be used at a later time if you forget your password. 8. Enter your e-mail address. You will receive e-mail notification when new information is available in [address] 9. Click Sign Up. You can now view and download portions of your medical record. 10. Click the Download Summary menu link to download a portable copy of your medical information. If you have questions, please visit the Frequently Asked Questions section of the [employer] website. Remember, [employer] is NOT to be used for urgent needs. For medical emergencies, dial 911. Now available from your iPhone and Android! Providers Seen During Your Hospitalization Provider Specialty Primary office phone Amelie Kennedy MD Emergency Medicine [phone] Lam Saldana MD Hospitalist [phone] Mekhi Zuñiga MD Family Practice [phone] Your Primary Care Physician (PCP) Primary Care Physician Office Phone Office Fax Sharita Prado [phone] You are allergic to the following No active allergies Recent Documentation Smoking Status Never Smoker Emergency Contacts Name Discharge Info Relation Home Work Mobile [facility] CAREGIVER [3] Spouse [3] [phone] Patient Belongings The following personal items are in your possession at time of discharge: 
  Dental Appliances: None         Home Medications: Kept at bedside   Jewelry: None  Clothing: Jacket/Coat, Shirt, Pants    Other Valuables: Wallet (few dollars in the wallet per pt.) Please provide this summary of care documentation to your next provider. Signatures-by signing, you are acknowledging that this After Visit Summary has been reviewed with you and you have received a copy. Patient Signature:  ____________________________________________________________ Date:  ____________________________________________________________  
  
Antonieta Trevizo  Provider Signature: ____________________________________________________________ Date:  ____________________________________________________________

## 2018-03-04 PROBLEM — T43.291A BUPROPION OVERDOSE: Status: ACTIVE | Noted: 2018-03-04

## 2018-03-04 PROBLEM — F32.A DEPRESSION: Status: ACTIVE | Noted: 2018-03-04

## 2018-03-04 PROBLEM — R45.851 SUICIDAL IDEATION: Status: ACTIVE | Noted: 2018-03-04

## 2018-03-04 LAB
AMPHET UR QL SCN: NEGATIVE
ANION GAP SERPL CALC-SCNC: 6 MMOL/L (ref 3–18)
ANION GAP SERPL CALC-SCNC: 9 MMOL/L (ref 3–18)
APAP SERPL-MCNC: <2 UG/ML (ref 10–30)
ATRIAL RATE: 103 BPM
ATRIAL RATE: 88 BPM
ATRIAL RATE: 95 BPM
BARBITURATES UR QL SCN: NEGATIVE
BASOPHILS # BLD: 0 K/UL (ref 0–0.1)
BASOPHILS NFR BLD: 0 % (ref 0–2)
BENZODIAZ UR QL: NEGATIVE
BUN SERPL-MCNC: 15 MG/DL (ref 7–18)
BUN SERPL-MCNC: 16 MG/DL (ref 7–18)
BUN/CREAT SERPL: 16 (ref 12–20)
BUN/CREAT SERPL: 16 (ref 12–20)
CALCIUM SERPL-MCNC: 8.1 MG/DL (ref 8.5–10.1)
CALCIUM SERPL-MCNC: 9.1 MG/DL (ref 8.5–10.1)
CALCULATED P AXIS, ECG09: 46 DEGREES
CALCULATED P AXIS, ECG09: 48 DEGREES
CALCULATED P AXIS, ECG09: 61 DEGREES
CALCULATED R AXIS, ECG10: -6 DEGREES
CALCULATED R AXIS, ECG10: 16 DEGREES
CALCULATED R AXIS, ECG10: 19 DEGREES
CALCULATED T AXIS, ECG11: 36 DEGREES
CALCULATED T AXIS, ECG11: 39 DEGREES
CALCULATED T AXIS, ECG11: 53 DEGREES
CANNABINOIDS UR QL SCN: NEGATIVE
CHLORIDE SERPL-SCNC: 104 MMOL/L (ref 100–108)
CHLORIDE SERPL-SCNC: 108 MMOL/L (ref 100–108)
CO2 SERPL-SCNC: 24 MMOL/L (ref 21–32)
CO2 SERPL-SCNC: 28 MMOL/L (ref 21–32)
COCAINE UR QL SCN: NEGATIVE
CREAT SERPL-MCNC: 0.93 MG/DL (ref 0.6–1.3)
CREAT SERPL-MCNC: 0.98 MG/DL (ref 0.6–1.3)
DIAGNOSIS, 93000: NORMAL
DIFFERENTIAL METHOD BLD: ABNORMAL
EOSINOPHIL # BLD: 0 K/UL (ref 0–0.4)
EOSINOPHIL NFR BLD: 0 % (ref 0–5)
ERYTHROCYTE [DISTWIDTH] IN BLOOD BY AUTOMATED COUNT: 12.7 % (ref 11.6–14.5)
ERYTHROCYTE [DISTWIDTH] IN BLOOD BY AUTOMATED COUNT: 12.8 % (ref 11.6–14.5)
ETHANOL SERPL-MCNC: 3 MG/DL (ref 0–3)
GLUCOSE SERPL-MCNC: 96 MG/DL (ref 74–99)
GLUCOSE SERPL-MCNC: 96 MG/DL (ref 74–99)
HCT VFR BLD AUTO: 43.7 % (ref 36–48)
HCT VFR BLD AUTO: 47.4 % (ref 36–48)
HDSCOM,HDSCOM: NORMAL
HGB BLD-MCNC: 14.5 G/DL (ref 13–16)
HGB BLD-MCNC: 16.2 G/DL (ref 13–16)
LYMPHOCYTES # BLD: 2.1 K/UL (ref 0.9–3.6)
LYMPHOCYTES NFR BLD: 21 % (ref 21–52)
MAGNESIUM SERPL-MCNC: 2.2 MG/DL (ref 1.6–2.6)
MCH RBC QN AUTO: 28.2 PG (ref 24–34)
MCH RBC QN AUTO: 28.7 PG (ref 24–34)
MCHC RBC AUTO-ENTMCNC: 33.2 G/DL (ref 31–37)
MCHC RBC AUTO-ENTMCNC: 34.2 G/DL (ref 31–37)
MCV RBC AUTO: 83.9 FL (ref 74–97)
MCV RBC AUTO: 84.9 FL (ref 74–97)
METHADONE UR QL: NEGATIVE
MONOCYTES # BLD: 0.9 K/UL (ref 0.05–1.2)
MONOCYTES NFR BLD: 8 % (ref 3–10)
NEUTS SEG # BLD: 7.2 K/UL (ref 1.8–8)
NEUTS SEG NFR BLD: 71 % (ref 40–73)
OPIATES UR QL: NEGATIVE
P-R INTERVAL, ECG05: 142 MS
P-R INTERVAL, ECG05: 154 MS
P-R INTERVAL, ECG05: 156 MS
PCP UR QL: NEGATIVE
PHOSPHATE SERPL-MCNC: 3.5 MG/DL (ref 2.5–4.9)
PLATELET # BLD AUTO: 243 K/UL (ref 135–420)
PLATELET # BLD AUTO: 271 K/UL (ref 135–420)
PMV BLD AUTO: 9.2 FL (ref 9.2–11.8)
PMV BLD AUTO: 9.5 FL (ref 9.2–11.8)
POTASSIUM SERPL-SCNC: 3.1 MMOL/L (ref 3.5–5.5)
POTASSIUM SERPL-SCNC: 3.5 MMOL/L (ref 3.5–5.5)
Q-T INTERVAL, ECG07: 364 MS
Q-T INTERVAL, ECG07: 370 MS
Q-T INTERVAL, ECG07: 372 MS
QRS DURATION, ECG06: 86 MS
QRS DURATION, ECG06: 92 MS
QRS DURATION, ECG06: 94 MS
QTC CALCULATION (BEZET), ECG08: 447 MS
QTC CALCULATION (BEZET), ECG08: 467 MS
QTC CALCULATION (BEZET), ECG08: 476 MS
RBC # BLD AUTO: 5.15 M/UL (ref 4.7–5.5)
RBC # BLD AUTO: 5.65 M/UL (ref 4.7–5.5)
SALICYLATES SERPL-MCNC: <2.8 MG/DL (ref 2.8–20)
SODIUM SERPL-SCNC: 138 MMOL/L (ref 136–145)
SODIUM SERPL-SCNC: 141 MMOL/L (ref 136–145)
VENTRICULAR RATE, ECG03: 103 BPM
VENTRICULAR RATE, ECG03: 88 BPM
VENTRICULAR RATE, ECG03: 95 BPM
WBC # BLD AUTO: 10.2 K/UL (ref 4.6–13.2)
WBC # BLD AUTO: 9.6 K/UL (ref 4.6–13.2)

## 2018-03-04 PROCEDURE — 93005 ELECTROCARDIOGRAM TRACING: CPT

## 2018-03-04 PROCEDURE — 85025 COMPLETE CBC W/AUTO DIFF WBC: CPT | Performed by: EMERGENCY MEDICINE

## 2018-03-04 PROCEDURE — 74011250636 HC RX REV CODE- 250/636: Performed by: INTERNAL MEDICINE

## 2018-03-04 PROCEDURE — 80048 BASIC METABOLIC PNL TOTAL CA: CPT | Performed by: INTERNAL MEDICINE

## 2018-03-04 PROCEDURE — 96374 THER/PROPH/DIAG INJ IV PUSH: CPT

## 2018-03-04 PROCEDURE — 83735 ASSAY OF MAGNESIUM: CPT | Performed by: INTERNAL MEDICINE

## 2018-03-04 PROCEDURE — 36415 COLL VENOUS BLD VENIPUNCTURE: CPT | Performed by: INTERNAL MEDICINE

## 2018-03-04 PROCEDURE — 99218 HC RM OBSERVATION: CPT

## 2018-03-04 PROCEDURE — 74011000250 HC RX REV CODE- 250

## 2018-03-04 PROCEDURE — 80307 DRUG TEST PRSMV CHEM ANLYZR: CPT | Performed by: EMERGENCY MEDICINE

## 2018-03-04 PROCEDURE — 84100 ASSAY OF PHOSPHORUS: CPT | Performed by: INTERNAL MEDICINE

## 2018-03-04 PROCEDURE — 80048 BASIC METABOLIC PNL TOTAL CA: CPT | Performed by: EMERGENCY MEDICINE

## 2018-03-04 PROCEDURE — 85027 COMPLETE CBC AUTOMATED: CPT | Performed by: INTERNAL MEDICINE

## 2018-03-04 PROCEDURE — 74011250636 HC RX REV CODE- 250/636: Performed by: EMERGENCY MEDICINE

## 2018-03-04 PROCEDURE — 65660000000 HC RM CCU STEPDOWN

## 2018-03-04 PROCEDURE — 74011250637 HC RX REV CODE- 250/637: Performed by: INTERNAL MEDICINE

## 2018-03-04 RX ORDER — SODIUM CHLORIDE 9 MG/ML
150 INJECTION, SOLUTION INTRAVENOUS CONTINUOUS
Status: DISCONTINUED | OUTPATIENT
Start: 2018-03-04 | End: 2018-03-05

## 2018-03-04 RX ORDER — ONDANSETRON 2 MG/ML
4 INJECTION INTRAMUSCULAR; INTRAVENOUS
Status: DISCONTINUED | OUTPATIENT
Start: 2018-03-04 | End: 2018-03-05 | Stop reason: HOSPADM

## 2018-03-04 RX ORDER — LORAZEPAM 2 MG/ML
2 INJECTION INTRAMUSCULAR
Status: DISCONTINUED | OUTPATIENT
Start: 2018-03-04 | End: 2018-03-05 | Stop reason: HOSPADM

## 2018-03-04 RX ORDER — DOCUSATE SODIUM 100 MG/1
100 CAPSULE, LIQUID FILLED ORAL
Status: DISCONTINUED | OUTPATIENT
Start: 2018-03-04 | End: 2018-03-05 | Stop reason: HOSPADM

## 2018-03-04 RX ORDER — ONDANSETRON 2 MG/ML
4 INJECTION INTRAMUSCULAR; INTRAVENOUS
Status: COMPLETED | OUTPATIENT
Start: 2018-03-04 | End: 2018-03-04

## 2018-03-04 RX ORDER — ACETAMINOPHEN 325 MG/1
650 TABLET ORAL
Status: DISCONTINUED | OUTPATIENT
Start: 2018-03-04 | End: 2018-03-05 | Stop reason: HOSPADM

## 2018-03-04 RX ORDER — LORAZEPAM 2 MG/ML
1 INJECTION INTRAMUSCULAR ONCE
Status: COMPLETED | OUTPATIENT
Start: 2018-03-04 | End: 2018-03-04

## 2018-03-04 RX ORDER — POTASSIUM CHLORIDE 20 MEQ/1
40 TABLET, EXTENDED RELEASE ORAL EVERY 4 HOURS
Status: COMPLETED | OUTPATIENT
Start: 2018-03-04 | End: 2018-03-04

## 2018-03-04 RX ORDER — NALOXONE HYDROCHLORIDE 0.4 MG/ML
0.4 INJECTION, SOLUTION INTRAMUSCULAR; INTRAVENOUS; SUBCUTANEOUS AS NEEDED
Status: DISCONTINUED | OUTPATIENT
Start: 2018-03-04 | End: 2018-03-05 | Stop reason: HOSPADM

## 2018-03-04 RX ORDER — OXYCODONE AND ACETAMINOPHEN 5; 325 MG/1; MG/1
1 TABLET ORAL
Status: DISCONTINUED | OUTPATIENT
Start: 2018-03-04 | End: 2018-03-05 | Stop reason: HOSPADM

## 2018-03-04 RX ADMIN — SODIUM CHLORIDE 1000 ML: 900 INJECTION, SOLUTION INTRAVENOUS at 01:51

## 2018-03-04 RX ADMIN — SODIUM CHLORIDE 150 ML/HR: 900 INJECTION, SOLUTION INTRAVENOUS at 19:47

## 2018-03-04 RX ADMIN — LORAZEPAM 2 MG: 2 INJECTION INTRAMUSCULAR; INTRAVENOUS at 20:53

## 2018-03-04 RX ADMIN — LORAZEPAM 2 MG: 2 INJECTION INTRAMUSCULAR; INTRAVENOUS at 12:46

## 2018-03-04 RX ADMIN — ACTIVATED CHARCOAL 50 G: 208 SUSPENSION ORAL at 01:54

## 2018-03-04 RX ADMIN — ONDANSETRON 4 MG: 2 SOLUTION INTRAMUSCULAR; INTRAVENOUS at 00:41

## 2018-03-04 RX ADMIN — SODIUM CHLORIDE 150 ML/HR: 900 INJECTION, SOLUTION INTRAVENOUS at 04:35

## 2018-03-04 RX ADMIN — LORAZEPAM 1 MG: 2 INJECTION INTRAMUSCULAR; INTRAVENOUS at 05:06

## 2018-03-04 RX ADMIN — POTASSIUM CHLORIDE 40 MEQ: 20 TABLET, EXTENDED RELEASE ORAL at 08:31

## 2018-03-04 RX ADMIN — POTASSIUM CHLORIDE 40 MEQ: 20 TABLET, EXTENDED RELEASE ORAL at 05:06

## 2018-03-04 NOTE — PROGRESS NOTES
Problem: Falls - Risk of  Goal: *Absence of Falls  Document Brent Fall Risk and appropriate interventions in the flowsheet.    Outcome: Progressing Towards Goal  Fall Risk Interventions:            Medication Interventions: Patient to call before getting OOB

## 2018-03-04 NOTE — ED TRIAGE NOTES
Patient states he is feeling suicidal,  Took a mixture of lexapro and wellbutrin trying to hurt himself

## 2018-03-04 NOTE — PROGRESS NOTES
[facility]  Progress Note    Patient: Katherine Ho Age: 30 y.o. : 1980 MR#: [id number] SSN: [id number]  Date: 3/4/2018     Subjective:     Patient reports taking wellbutrin (#10 of sister's medication) and lexapro (#6 of his own medication) after having an argument with his parents at home. He has partially reconciled with his parents but states \"it will take a long time to reconcile with my wife\". Denies suicidal ideation \"right now\". States had some suicidal thoughts last year but no action on these thoughts (states he spoke to his  whom then called 911). In remote past with suicidal attempt in  by \"throwing himself in traffic\". Patient states he has never been hospitalized for depression. Reports involuntary twitching movements most prominent to BLE, denies shortness of breath. Did have a large BM s/p charcoal in ER earlier today. Reports episodes of visual disturbances 7-8 since this morning approximately 8AM.  Describes as like flashbacks, never frightening     Assessment/Plan:   1. Suicidal ideation with bupropion overdose - s/p charcoal in ED. No QT prolongation in discussion with tele. D/c Q6hour EKGs, follow on tele monitoring. Psych consulted, spoke with crisis nurse Malka Aguilar and Dr. Ritesh Ibrahim added to tx team per recommendation. 2.  Depression - psych consult, previously on lexapro as outpatient   3. Tremor - likely from wellbutrin overdose, cont ativan  4.   Visual disturbances - does not appear to be r/t overdose; await formal psych consult    Regular Diet entered    Additional Notes:      Case discussed with:  [x]Patient  []Family  [x]Nursing  []Case Management  DVT Prophylaxis:  []Lovenox  []Hep SQ  [x]SCDs  []Coumadin   []On Heparin gtt    Objective:   VS:   Visit Vitals    /67 (BP 1 Location: Left arm, BP Patient Position: At rest)    Pulse (!) 114    Temp 97.7 °F (36.5 °C)    Resp 20    SpO2 97% Tmax/24hrs: Temp (24hrs), Av.6 °F (36.4 °C), Min:97.4 °F (36.3 °C), Max:97.7 °F (36.5 °C)  No intake or output data in the 24 hours ending 18 1225    General:  Alert, NAD  Cardiovascular:  RRR, mild tachy intermittent, NS per monitor room  Pulmonary:  LSC throughout  GI:  +BS in all four quadrants, soft, non-tender  Extremities:  No edema; 2+ dorsalis pedis pulses bilaterally  Neuro: oriented x 4; generalized tremor to jaw / legs intermittent    Family contact: Wife Ayan Cruz [phone]    Labs:    Recent Results (from the past 24 hour(s))   METABOLIC PANEL, BASIC    Collection Time: 18 12:45 AM   Result Value Ref Range    Sodium 138 136 - 145 mmol/L    Potassium 3.1 (L) 3.5 - 5.5 mmol/L    Chloride 104 100 - 108 mmol/L    CO2 28 21 - 32 mmol/L    Anion gap 6 3.0 - 18 mmol/L    Glucose 96 74 - 99 mg/dL    BUN 16 7.0 - 18 MG/DL    Creatinine 0.98 0.6 - 1.3 MG/DL    BUN/Creatinine ratio 16 12 - 20      GFR est AA >60 >60 ml/min/1.73m2    GFR est non-AA >60 >60 ml/min/1.73m2    Calcium 9.1 8.5 - 01.2 MG/DL   SALICYLATE    Collection Time: 18 12:45 AM   Result Value Ref Range    Salicylate level <4.9 (L) 2.8 - 20.0 MG/DL   ACETAMINOPHEN    Collection Time: 18 12:45 AM   Result Value Ref Range    Acetaminophen level <2 (L) 10.0 - 30.0 ug/mL   ETHYL ALCOHOL    Collection Time: 18 12:45 AM   Result Value Ref Range    ALCOHOL(ETHYL),SERUM 3 0 - 3 MG/DL   CBC WITH AUTOMATED DIFF    Collection Time: 18 12:45 AM   Result Value Ref Range    WBC 10.2 4.6 - 13.2 K/uL    RBC 5.65 (H) 4.70 - 5.50 M/uL    HGB 16.2 (H) 13.0 - 16.0 g/dL    HCT 47.4 36.0 - 48.0 %    MCV 83.9 74.0 - 97.0 FL    MCH 28.7 24.0 - 34.0 PG    MCHC 34.2 31.0 - 37.0 g/dL    RDW 12.7 11.6 - 14.5 %    PLATELET 001 810 - 494 K/uL    MPV 9.5 9.2 - 11.8 FL    NEUTROPHILS 71 40 - 73 %    LYMPHOCYTES 21 21 - 52 %    MONOCYTES 8 3 - 10 %    EOSINOPHILS 0 0 - 5 %    BASOPHILS 0 0 - 2 %    ABS.  NEUTROPHILS 7.2 1.8 - 8.0 K/UL    ABS. LYMPHOCYTES 2.1 0.9 - 3.6 K/UL    ABS. MONOCYTES 0.9 0.05 - 1.2 K/UL    ABS. EOSINOPHILS 0.0 0.0 - 0.4 K/UL    ABS.  BASOPHILS 0.0 0.0 - 0.1 K/UL    DF AUTOMATED     DRUG SCREEN, URINE    Collection Time: 03/04/18 12:50 AM   Result Value Ref Range    BENZODIAZEPINES NEGATIVE  NEG      BARBITURATES NEGATIVE  NEG      THC (TH-CANNABINOL) NEGATIVE  NEG      OPIATES NEGATIVE  NEG      PCP(PHENCYCLIDINE) NEGATIVE  NEG      COCAINE NEGATIVE  NEG      AMPHETAMINES NEGATIVE  NEG      METHADONE NEGATIVE  NEG      HDSCOM (NOTE)    EKG, 12 LEAD, INITIAL    Collection Time: 03/04/18  1:09 AM   Result Value Ref Range    Ventricular Rate 103 BPM    Atrial Rate 103 BPM    P-R Interval 142 ms    QRS Duration 92 ms    Q-T Interval 364 ms    QTC Calculation (Bezet) 476 ms    Calculated P Axis 61 degrees    Calculated R Axis -6 degrees    Calculated T Axis 53 degrees    Diagnosis       Sinus tachycardia  Otherwise normal ECG  No previous ECGs available  Confirmed by Adelfo Castle MD, [facility] ([id number]) on 3/4/2018 21:74:17 AM     METABOLIC PANEL, BASIC    Collection Time: 03/04/18  5:09 AM   Result Value Ref Range    Sodium 141 136 - 145 mmol/L    Potassium 3.5 3.5 - 5.5 mmol/L    Chloride 108 100 - 108 mmol/L    CO2 24 21 - 32 mmol/L    Anion gap 9 3.0 - 18 mmol/L    Glucose 96 74 - 99 mg/dL    BUN 15 7.0 - 18 MG/DL    Creatinine 0.93 0.6 - 1.3 MG/DL    BUN/Creatinine ratio 16 12 - 20      GFR est AA >60 >60 ml/min/1.73m2    GFR est non-AA >60 >60 ml/min/1.73m2    Calcium 8.1 (L) 8.5 - 10.1 MG/DL   MAGNESIUM    Collection Time: 03/04/18  5:09 AM   Result Value Ref Range    Magnesium 2.2 1.6 - 2.6 mg/dL   PHOSPHORUS    Collection Time: 03/04/18  5:09 AM   Result Value Ref Range    Phosphorus 3.5 2.5 - 4.9 MG/DL   CBC W/O DIFF    Collection Time: 03/04/18  5:09 AM   Result Value Ref Range    WBC 9.6 4.6 - 13.2 K/uL    RBC 5.15 4.70 - 5.50 M/uL    HGB 14.5 13.0 - 16.0 g/dL    HCT 43.7 36.0 - 48.0 %    MCV 84.9 74.0 - 97.0 FL    MCH 28.2 24.0 - 34.0 PG    MCHC 33.2 31.0 - 37.0 g/dL    RDW 12.8 11.6 - 14.5 %    PLATELET 731 029 - 492 K/uL    MPV 9.2 9.2 - 11.8 FL   EKG, 12 LEAD, INITIAL    Collection Time: 03/04/18  5:18 AM   Result Value Ref Range    Ventricular Rate 95 BPM    Atrial Rate 95 BPM    P-R Interval 156 ms    QRS Duration 94 ms    Q-T Interval 372 ms    QTC Calculation (Bezet) 467 ms    Calculated P Axis 48 degrees    Calculated R Axis 19 degrees    Calculated T Axis 39 degrees    Diagnosis       Normal sinus rhythm  Normal ECG  When compared with ECG of 04-MAR-2018 01:09,  No significant change was found  Confirmed by Adelfo Castle MD, [place] ([id number]) on 3/4/2018 10:51:23 AM     EKG, 12 LEAD, INITIAL    Collection Time: 03/04/18 12:21 PM   Result Value Ref Range    Ventricular Rate 88 BPM    Atrial Rate 88 BPM    P-R Interval 154 ms    QRS Duration 86 ms    Q-T Interval 370 ms    QTC Calculation (Bezet) 447 ms    Calculated P Axis 46 degrees    Calculated R Axis 16 degrees    Calculated T Axis 36 degrees    Diagnosis       Normal sinus rhythm  Normal ECG  When compared with ECG of 04-MAR-2018 05:18,  No significant change was found       Signed By: Monica Villa NP     March 4, 2018

## 2018-03-04 NOTE — H&P
History & Physical    Patient: Sabino Casanova MRN: [id number]  CSN: [id number]    YOB: 1980  Age: 30 y.o. Sex: male      DOA: 3/3/2018    Chief Complaint:   Chief Complaint   Patient presents with    Suicidal          HPI:     Sabino Casanova is a 30 y.o.  male who has PMH of depression, presents to ER by himself after ingestion 10 bupropion and 6 lexapro. Pt states that he feels overwhelmed with his wrong actions and how he verbally hurt the people that he loves. Pt took the pills then thought of the Side effects and risks and walked to the hospital to seek treatment. Poison control was contacted by ER. Pt was given charcoal and will be admitted for further monitoring     Past Medical History:   Diagnosis Date    Depression        Past Surgical History:   Procedure Laterality Date    COLONOSCOPY N/A 12/13/2017    COLONOSCOPY (Con-Sed) performed by Mayra Cummings MD at [facility] ENDOSCOPY    HX CHOLECYSTECTOMY      HX ORTHOPAEDIC      scope right knee       History reviewed. No pertinent family history. Social History     Social History    Marital status:      Spouse name: N/A    Number of children: N/A    Years of education: N/A     Social History Main Topics    Smoking status: Never Smoker    Smokeless tobacco: Never Used    Alcohol use No    Drug use: No    Sexual activity: Not Asked     Other Topics Concern    None     Social History Narrative       Prior to Admission medications    Medication Sig Start Date End Date Taking? Authorizing Provider   escitalopram oxalate (LEXAPRO) 10 mg tablet Take 10 mg by mouth daily. Phys MD Darvin   ibuprofen (MOTRIN) 600 mg tablet Take 1 Tab by mouth every six (6) hours as needed for Pain. 10/18/17   Dimitris Walker NP   hydrocortisone (ANUSOL-HC) 2.5 % rectal cream Insert  into rectum four (4) times daily.  10/18/17   Dimitris Walker NP       No Known Allergies      Review of Systems  GENERAL: Patient alert, awake and oriented times 3, able to communicate full sentences and not in distress. HEENT: No change in vision, no earache, tinnitus, sore throat or sinus congestion. NECK: No pain or stiffness. PULMONARY: No shortness of breath, cough or wheeze. Cardiovascular: no pnd / orthopnea, no CP  GASTROINTESTINAL: No abdominal pain, nausea, vomiting or diarrhea, melena or bright red blood per rectum. GENITOURINARY: No urinary frequency, urgency, hesitancy or dysuria. MUSCULOSKELETAL: No joint or muscle pain, no back pain, no recent trauma. DERMATOLOGIC: No rash, no itching, no lesions. ENDOCRINE: No polyuria, polydipsia, no heat or cold intolerance. No recent change in weight. HEMATOLOGICAL: No anemia or easy bruising or bleeding. NEUROLOGIC: No headache, seizures, numbness, tingling or weakness. Physical Exam:     Physical Exam:  Visit Vitals    /74 (BP 1 Location: Left arm, BP Patient Position: At rest)    Pulse (!) 110    Temp 97.4 °F (36.3 °C)    Resp 18    SpO2 97%           Temp (24hrs), Av.4 °F (36.3 °C), Min:97.4 °F (36.3 °C), Max:97.4 °F (36.3 °C)             General:  Alert, cooperative, no distress, appears stated age. Head: Normocephalic, without obvious abnormality, atraumatic. Eyes:  Conjunctivae/corneas clear. PERRL, EOMs intact. Nose: Nares normal. No drainage or sinus tenderness. Neck: Supple, symmetrical, trachea midline, no adenopathy, thyroid: no enlargement, no carotid bruit and no JVD. Lungs:   Clear to auscultation bilaterally. Heart:  Regular rate and rhythm, S1, S2 normal.     Abdomen: Soft, non-tender. Bowel sounds normal.    Extremities: Extremities normal, atraumatic, no cyanosis or edema. Pulses: 2+ and symmetric all extremities. Skin:  No rashes or lesions   Neurologic: AAOx3, No focal motor or sensory deficit. Labs Reviewed: All lab results for the last 24 hours reviewed.   EKG    Procedures/imaging: see electronic medical records for all procedures/Xrays and details which were not copied into this note but were reviewed prior to creation of Plan      Assessment/Plan     Principal Problem:    Bupropion overdose (3/4/2018)    Active Problems:    Suicidal ideation (3/4/2018)      Depression (3/4/2018)       Pt is admitted for polysubstance abuse Bupropion and Lexapro and suicidal ideation  Poison Control is called and following   Close monitoring for possible developing of serotonin Sx and or QT prolongation  Tele monitoring  Serial EKGs to r/o QT prolongation    IVF  Received Charcoal in ER prior to admission     Suicidal ideation and depression >.Psych consult when medically cleared       DVT/GI Prophylaxis: Lovenox    Plan of care is discussed in details with Patient/Family at bedside and agreed upon    Olga Silverman MD  3/4/2018 2:47 AM

## 2018-03-04 NOTE — ROUTINE PROCESS
TRANSFER - OUT REPORT:    Verbal report given to [place] on Jenanine Jackson  being transferred to [address] [place] [address](unit) for routine progression of care       Report consisted of patients Situation, Background, Assessment and   Recommendations(SBAR). Information from the following report(s) SBAR and MAR was reviewed with the receiving nurse. Lines:       Opportunity for questions and clarification was provided.       Patient transported with:   Monitor  Registered Nurse

## 2018-03-04 NOTE — ROUTINE PROCESS
Bedside and Verbal shift change report given to AYSE Luu (oncoming nurse) by Beverly Buerger, RN (offgoing nurse). Report included the following information SBAR, Kardex, MAR and Recent Results. SITUATION:  Code Status: Full Code  Reason for Admission: Bupropion overdose, intentional self-harm, initial encounter ([facility] [address]  Bupropion overdose, intentional self-harm, initial encounter [facility])  Hospital day: 1  Problem List:       Hospital Problems  Date Reviewed: 3/4/2018          Codes Class Noted POA    * (Principal)Bupropion overdose ICD-10-CM: T43.291A  ICD-9-CM: 969.00, E980.3  3/4/2018 Yes        Suicidal ideation ICD-10-CM: R45.851  ICD-9-CM: V62.84  3/4/2018 Unknown        Depression ICD-10-CM: F32.9  ICD-9-CM: 537  3/4/2018 Unknown              BACKGROUND:   Past Medical History:   Past Medical History:   Diagnosis Date    Depression       Patient taking anticoagulants no    Patient has a defibrillator: no    History of shots NO for example, flu, pneumonia, tetanus   Isolation History NO for example, MRSA, CDiff    ASSESSMENT:  Changes in Assessment Throughout Shift: NONE  Significant Changes in 24 hours (for example, RR/code, fall)  Patient has Central Line: no Reasons if yes:   Patient has Palmer Cath: no Reasons if yes:     Mobility Issues  PT  IV Patency  OR Checklist  Pending Tests    Last Vitals:  Vitals w/ MEWS Score (last day)     Date/Time MEWS Score Pulse Resp Temp BP Level of Consciousness SpO2    03/04/18 0255 2 (!)  110 18 97.4 °F (36.3 °C) 117/74 Alert 97 %    03/04/18 0200 -- 94 15 -- 113/72 -- 97 %    03/04/18 0145 -- (!)  105 20 -- 121/79 -- 99 %    03/04/18 0130 -- 96 16 -- 114/75 -- 98 %    03/04/18 0115 -- 90 14 -- 122/69 -- 97 %    03/04/18 0100 -- (!)  101 16 -- 122/84 -- 98 %    03/04/18 0045 -- -- -- -- 123/74 -- 96 %            PAIN    Pain Assessment    Pain Intensity 1: 5 (03/04/18 0300)              Patient Stated Pain Goal: 0  Intervention effective: yes  Time of last intervention: 3/4/18 Reassessment Completed: yes   Other actions taken for pain: Distraction    Last 3 Weights: There were no vitals filed for this visit. Weight change:     INTAKE/OUPUT    Current Shift:      Last three shifts:      RECOMMENDATIONS AND DISCHARGE PLANNING  Patient needs and requests: none    Pending tests/procedures: Labs     Discharge plan for patient: TBD    Discharge planning Needs or Barriers: None    Estimated Discharge Date: TBD Posted on Whiteboard in Patients Room: yes       \"HEALS\" SAFETY CHECK  A safety check occurred in the patient's room between off going nurse and oncoming nurse listed above. The safety check included the below items:    H  High Alert Medications Verify all high alert medication drips (heparin, PCA, etc.)  E  Equipment Suction is set up for ALL patients (with antonia)  Red plugs utilized for all equipment (IV pumps, etc.)  WOWs wiped down at end of shift. Room stocked with oxygen, suction, and other unit-specific supplies  A  Alarms Bed alarm is set for fall risk patients  Ensure chair alarm is in place and activated if patient is up in a chair  L  Lines Check IV for any infiltration  Palmer bag is empty if patient has a Palmer   Tubing and IV bags are labeled  S  Safety  Room is clean, patient is clean, and equipment is clean. Hallways are clear from equipment besides carts. Fall bracelet on for fall risk patients  Ensure room is clear and free of clutter  Suction is set up for ALL patients (with antonia)  Hallways are clear from equipment besides carts.    Isolation precautions followed, supplies available outside room, sign posted    Filiberto Jackson RN

## 2018-03-04 NOTE — PROGRESS NOTES
Spoke to crisis request for psych consult. Patient information was taken and Mickeal Pulling will return call to this provider. Want to insure psych consult will follow with patient.       Spoke to Mickeal Pulling with crisis - patient will be seen by psych for eval.  Dr. Samantha Bianchi added to treatment team.      Signed By: Juan Carlos Aamdo NP     March 4, 2018

## 2018-03-04 NOTE — PROGRESS NOTES
TRANSFER - IN REPORT:    Verbal report received from AYSE Byrnes(name) on Alexei Promise  being received from ER(unit) for routine progression of care      Report consisted of patients Situation, Background, Assessment and   Recommendations(SBAR). Information from the following report(s) SBAR and Kardex was reviewed with the receiving nurse. Opportunity for questions and clarification was provided. Assessment completed upon patients arrival to unit and care assumed.

## 2018-03-05 ENCOUNTER — HOSPITAL ENCOUNTER (INPATIENT)
Age: 38
LOS: 7 days | Discharge: HOME | DRG: 885 | End: 2018-03-12
Attending: PSYCHIATRY & NEUROLOGY | Admitting: STUDENT IN AN ORGANIZED HEALTH CARE EDUCATION/TRAINING PROGRAM
Payer: SELF-PAY

## 2018-03-05 VITALS
OXYGEN SATURATION: 98 % | SYSTOLIC BLOOD PRESSURE: 128 MMHG | DIASTOLIC BLOOD PRESSURE: 81 MMHG | RESPIRATION RATE: 18 BRPM | HEART RATE: 77 BPM | TEMPERATURE: 97.7 F

## 2018-03-05 LAB
ALBUMIN SERPL-MCNC: 3.4 G/DL (ref 3.4–5)
ALBUMIN/GLOB SERPL: 1 {RATIO} (ref 0.8–1.7)
ALP SERPL-CCNC: 92 U/L (ref 45–117)
ALT SERPL-CCNC: 25 U/L (ref 16–61)
ANION GAP SERPL CALC-SCNC: 5 MMOL/L (ref 3–18)
AST SERPL-CCNC: 9 U/L (ref 15–37)
BASOPHILS # BLD: 0 K/UL (ref 0–0.1)
BASOPHILS NFR BLD: 0 % (ref 0–2)
BILIRUB SERPL-MCNC: 0.3 MG/DL (ref 0.2–1)
BUN SERPL-MCNC: 9 MG/DL (ref 7–18)
BUN/CREAT SERPL: 10 (ref 12–20)
CALCIUM SERPL-MCNC: 8.5 MG/DL (ref 8.5–10.1)
CHLORIDE SERPL-SCNC: 108 MMOL/L (ref 100–108)
CO2 SERPL-SCNC: 29 MMOL/L (ref 21–32)
CREAT SERPL-MCNC: 0.91 MG/DL (ref 0.6–1.3)
DIFFERENTIAL METHOD BLD: NORMAL
EOSINOPHIL # BLD: 0.2 K/UL (ref 0–0.4)
EOSINOPHIL NFR BLD: 2 % (ref 0–5)
ERYTHROCYTE [DISTWIDTH] IN BLOOD BY AUTOMATED COUNT: 12.9 % (ref 11.6–14.5)
GLOBULIN SER CALC-MCNC: 3.3 G/DL (ref 2–4)
GLUCOSE SERPL-MCNC: 89 MG/DL (ref 74–99)
HCT VFR BLD AUTO: 43.1 % (ref 36–48)
HGB BLD-MCNC: 13.8 G/DL (ref 13–16)
LYMPHOCYTES # BLD: 2.3 K/UL (ref 0.9–3.6)
LYMPHOCYTES NFR BLD: 28 % (ref 21–52)
MCH RBC QN AUTO: 27.8 PG (ref 24–34)
MCHC RBC AUTO-ENTMCNC: 32 G/DL (ref 31–37)
MCV RBC AUTO: 86.9 FL (ref 74–97)
MONOCYTES # BLD: 0.9 K/UL (ref 0.05–1.2)
MONOCYTES NFR BLD: 10 % (ref 3–10)
NEUTS SEG # BLD: 4.9 K/UL (ref 1.8–8)
NEUTS SEG NFR BLD: 60 % (ref 40–73)
PLATELET # BLD AUTO: 231 K/UL (ref 135–420)
PMV BLD AUTO: 9.3 FL (ref 9.2–11.8)
POTASSIUM SERPL-SCNC: 3.7 MMOL/L (ref 3.5–5.5)
PROT SERPL-MCNC: 6.7 G/DL (ref 6.4–8.2)
RBC # BLD AUTO: 4.96 M/UL (ref 4.7–5.5)
SODIUM SERPL-SCNC: 142 MMOL/L (ref 136–145)
WBC # BLD AUTO: 8.2 K/UL (ref 4.6–13.2)

## 2018-03-05 PROCEDURE — 74011250636 HC RX REV CODE- 250/636: Performed by: INTERNAL MEDICINE

## 2018-03-05 PROCEDURE — 80053 COMPREHEN METABOLIC PANEL: CPT | Performed by: NURSE PRACTITIONER

## 2018-03-05 PROCEDURE — 74011250637 HC RX REV CODE- 250/637: Performed by: STUDENT IN AN ORGANIZED HEALTH CARE EDUCATION/TRAINING PROGRAM

## 2018-03-05 PROCEDURE — 36415 COLL VENOUS BLD VENIPUNCTURE: CPT | Performed by: NURSE PRACTITIONER

## 2018-03-05 PROCEDURE — 85025 COMPLETE CBC W/AUTO DIFF WBC: CPT | Performed by: NURSE PRACTITIONER

## 2018-03-05 PROCEDURE — 65220000003 HC RM SEMIPRIVATE PSYCH

## 2018-03-05 RX ORDER — ESCITALOPRAM OXALATE 20 MG/1
20 TABLET ORAL DAILY
COMMUNITY
End: 2018-03-12

## 2018-03-05 RX ORDER — HALOPERIDOL 5 MG/1
5 TABLET ORAL
Status: DISCONTINUED | OUTPATIENT
Start: 2018-03-05 | End: 2018-03-12 | Stop reason: HOSPADM

## 2018-03-05 RX ORDER — TRAZODONE HYDROCHLORIDE 50 MG/1
50 TABLET ORAL
Status: DISCONTINUED | OUTPATIENT
Start: 2018-03-05 | End: 2018-03-12 | Stop reason: HOSPADM

## 2018-03-05 RX ORDER — LORAZEPAM 1 MG/1
1-2 TABLET ORAL
Status: DISCONTINUED | OUTPATIENT
Start: 2018-03-05 | End: 2018-03-12 | Stop reason: HOSPADM

## 2018-03-05 RX ORDER — LORAZEPAM 2 MG/ML
1-2 INJECTION INTRAMUSCULAR
Status: DISCONTINUED | OUTPATIENT
Start: 2018-03-05 | End: 2018-03-12 | Stop reason: HOSPADM

## 2018-03-05 RX ORDER — HALOPERIDOL 5 MG/ML
5 INJECTION INTRAMUSCULAR
Status: DISCONTINUED | OUTPATIENT
Start: 2018-03-05 | End: 2018-03-12 | Stop reason: HOSPADM

## 2018-03-05 RX ADMIN — SODIUM CHLORIDE 150 ML/HR: 900 INJECTION, SOLUTION INTRAVENOUS at 04:02

## 2018-03-05 RX ADMIN — LORAZEPAM 2 MG: 2 INJECTION INTRAMUSCULAR; INTRAVENOUS at 08:58

## 2018-03-05 RX ADMIN — TRAZODONE HYDROCHLORIDE 50 MG: 50 TABLET ORAL at 20:46

## 2018-03-05 RX ADMIN — LORAZEPAM 2 MG: 2 INJECTION INTRAMUSCULAR; INTRAVENOUS at 12:09
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
## 2018-03-05 NOTE — IP AVS SNAPSHOT
Mildred Mast 
 
 
 [address] [facility] [address] Patient: Kimberley Hathaway MRN: [id number] EZP:[birth year] About your hospitalization You were admitted on:  March 5, 2018 You last received care in the:  [facility] ADULT CHEM DEP You were discharged on:  March 12, 2018 Why you were hospitalized Your primary diagnosis was:  Mdd (Major Depressive Disorder), Recurrent Severe, Without Psychosis (Hcc) Follow-up Information Follow up With Details Comments Contact Info Melly Montanez MD   [address] [facility] [address] 
[phone] [employer] On 3/16/2018 with [place] Lynne @ 2:00  [address] 
[address] [facility] [address] 
[phone] [facility] On 3/29/2018 with Dr. Bud Fitzpatrick @ 12:45 am  [address] 
[address] [facility] [address] 
[phone] [address]  can make own appointment for Aspberger's testing if desired [address] [facility] [address] [address] 
[phone] [facility] Psychological Services  can make own appointment for Aspberger's testing if desired [address] [facility] [address] [place],  [address] [phone] Discharge Orders None A check paula indicates which time of day the medication should be taken. My Medications START taking these medications Instructions Each Dose to Equal  
 Morning Noon Evening Bedtime  
 fluticasone 50 mcg/actuation nasal spray Commonly known as:  Celestia Meggan Your last dose was: Your next dose is: 2 Sprays by Both Nostrils route daily. Indications: Allergic Rhinitis 2 Spray  
    
   
   
   
  
 hydrOXYzine pamoate 25 mg capsule Commonly known as:  VISTARIL Your last dose was: Your next dose is:     
   
   
 Take 1 Cap by mouth three (3) times daily as needed for Anxiety (insomnia) for up to 14 days. Indications: anxiety, insomnia 25 mg  
    
   
   
   
  
 ipratropium 0.03 % nasal spray Commonly known as:  ATROVENT Your last dose was: Your next dose is: 2 Sprays by Both Nostrils route two (2) times a day. Indications: PERENNIAL ALLERGIC RHINITIS 2 Spray  
    
   
   
   
  
 loratadine 10 mg tablet Commonly known as:  Noel Getting Your last dose was: Your next dose is: Take 1 Tab by mouth daily. Indications: Allergic Rhinitis 10 mg  
    
   
   
   
  
 traZODone 50 mg tablet Commonly known as:  Addison Bravo Your last dose was: Your next dose is: Take 1 Tab by mouth nightly as needed for Sleep. Indications: insomnia associated with depression 50 mg  
    
   
   
   
  
 venlafaxine-SR 75 mg capsule Commonly known as:  EFFEXOR-XR Start taking on:  3/13/2018 Your last dose was: Your next dose is: Take 1 Cap by mouth daily (with breakfast). Indications: major depressive disorder 75 mg  
    
   
   
   
  
  
STOP taking these medications LEXAPRO 20 mg tablet Generic drug:  escitalopram oxalate Where to Get Your Medications Information on where to get these meds will be given to you by the nurse or doctor. ! Ask your nurse or doctor about these medications  
  fluticasone 50 mcg/actuation nasal spray  
 hydrOXYzine pamoate 25 mg capsule  
 ipratropium 0.03 % nasal spray  
 loratadine 10 mg tablet  
 traZODone 50 mg tablet  
 venlafaxine-SR 75 mg capsule Discharge Instructions BEHAVIORAL HEALTH NURSING DISCHARGE NOTE Emergency Numbers [address] [facility] Desk: [phone] [facility] Emergency Services: [phone] Suicide Prevention Line: [phone] (TALK) The following personal items collected during your admission are returned to you:  
Dental Appliance: Dental Appliances: None Vision: Visual Aid: Glasses Hearing Aid:   
Jewelry: Jewelry: None Clothing: Clothing: Footwear, Jacket/Coat, Pants, Shirt, Undergarments Other Valuables: Other Valuables: Tj Holley Valuables sent to safe: Personal Items Sent to Safe: twelve dollars cash The discharge information has been reviewed with the patient. The patient verbalized understanding. Box[employer]art Activation Thank you for requesting access to SongFlame. Please follow the instructions below to securely access and download your online medical record. SongFlame allows you to send messages to your doctor, view your test results, renew your prescriptions, schedule appointments, and more. How Do I Sign Up? In your internet browser, go to [email or web address] Click on the First Time User? Click Here link in the Sign In box. You will be redirect to the New Member Sign Up page. Enter your SongFlame Access Code exactly as it appears below. You will not need to use this code after youve completed the sign-up process. If you do not sign up before the expiration date, you must request a new code. SongFlame Access Code: [email or web address] Expires: 6/3/2018  5:55 PM (This is the date your SongFlame access code will ) Enter the last four digits of your Social Security Number (xxxx) and Date of Birth (mm/dd/yyyy) as indicated and click Submit. You will be taken to the next sign-up page. Create a SongFlame ID. This will be your SongFlame login ID and cannot be changed, so think of one that is secure and easy to remember. Create a SongFlame password. You can change your password at any time. Enter your Password Reset Question and Answer. This can be used at a later time if you forget your password. Enter your e-mail address. You will receive e-mail notification when new information is available in [address] Click Sign Up. You can now view and download portions of your medical record. Click the Cerac link to download a portable copy of your medical information. Additional Information If you have questions, please visit the Frequently Asked Questions section of the PassportParking website at [email or web address] Remember, PassportParking is NOT to be used for urgent needs. For medical emergencies, dial 911. Patient armband removed and shredded [employer] Announcement We are excited to announce that we are making your provider's discharge notes available to you in PassportParking. You will see these notes when they are completed and signed by the physician that discharged you from your recent hospital stay. If you have any questions or concerns about any information you see in PassportParking, please call the Health Information Department where you were seen or reach out to your Primary Care Provider for more information about your plan of care. Introducing [facility] SERVICES! Delisa Edmonds introduces PassportParking patient portal. Now you can access parts of your medical record, email your doctor's office, and request medication refills online. 1. In your internet browser, go to [email or web address] 2. Click on the First Time User? Click Here link in the Sign In box. You will see the New Member Sign Up page. 3. Enter your PassportParking Access Code exactly as it appears below. You will not need to use this code after youve completed the sign-up process. If you do not sign up before the expiration date, you must request a new code. · [employer]portParking Access Code: [email or web address] Expires: 6/3/2018  5:55 PM 
 
4. Enter the last four digits of your Social Security Number (xxxx) and Date of Birth (mm/dd/yyyy) as indicated and click Submit. You will be taken to the next sign-up page. 5. Create a PassportParking ID. This will be your PassportParking login ID and cannot be changed, so think of one that is secure and easy to remember. 6. Create a [employer] password. You can change your password at any time. 7. Enter your Password Reset Question and Answer. This can be used at a later time if you forget your password. 8. Enter your e-mail address. You will receive e-mail notification when new information is available in [address] 9. Click Sign Up. You can now view and download portions of your medical record. 10. Click the Download Summary menu link to download a portable copy of your medical information. If you have questions, please visit the Frequently Asked Questions section of the [employer] website. Remember, [employer] is NOT to be used for urgent needs. For medical emergencies, dial 911. Now available from your iPhone and Android! Providers Seen During Your Hospitalization Provider Specialty Primary office phone Anny Magaña MD Psychiatry [phone] Your Primary Care Physician (PCP) Primary Care Physician Office Phone Office Fax Reginald Castle [phone] You are allergic to the following No active allergies Recent Documentation Height Weight BMI Smoking Status 1.829 m 99.3 kg 29.7 kg/m2 Never Smoker Emergency Contacts Name Discharge Info Relation Home Work Mobile [address] [facility] CAREGIVER [3] Spouse [3] [phone] Patient Belongings The following personal items are in your possession at time of discharge: 
  Dental Appliances: None  Visual Aid: Glasses      Home Medications: None   Jewelry: None  Clothing: Footwear, Jacket/Coat, Pants, Shirt, Undergarments    Other Valuables: Tania Coffey  Personal Items Sent to Safe: twelve dollars cash Please provide this summary of care documentation to your next provider. Signatures-by signing, you are acknowledging that this After Visit Summary has been reviewed with you and you have received a copy.   
  
 
  
    
    
 Patient Signature: ____________________________________________________________ Date:  ____________________________________________________________  
  
[place] Provider Signature:  ____________________________________________________________ Date:  ____________________________________________________________

## 2018-03-05 NOTE — DISCHARGE INSTRUCTIONS
Discharge Instructions    Patient: Sabino Casanova MRN: [id number]  CSN: [id number]    YOB: 1980  Age: 30 y.o. Sex: male    DOA: 3/3/2018 LOS:  LOS: 1 day   Discharge Date:      DIET:  Regular Diet  ACTIVITY: Activity as tolerated, no restrictions     ADDITIONAL INFORMATION: If you experience any of the following symptoms but not limited to Fever, chills, nausea, vomiting, diarrhea, change in mentation, falling, bleeding, shortness of breath, chest pain, please call your primary care physician or return to the emergency room if you cannot get hold of your doctor:     FOLLOW UP CARE:  Dr. Ismael Guerrero MD in 7  Days upon discharge from behavioral [address], NP  3/5/2018 5:02 PM     Alcohol, Drug, or Poison Ingestion: Care Instructions  Your Care Instructions    A person can become very sick, or die, from swallowing or using alcohol, drugs, or poisons. Alcohol poisoning occurs when a person drinks a large amount of alcohol. Alcohol can stop nerve signals that control breathing. It can also stop the gag reflex that prevents choking. Alcohol poisoning is serious. It can lead to brain damage or death if it's not treated right away. Drugs can be used by accident or on purpose. They can be swallowed, inhaled, injected, or absorbed through the skin. Drugs include over-the-counter medicine (such as aspirin or acetaminophen) and prescription medicine. They also include vitamins and supplements. And they include illegal drugs such as cocaine and heroin. And poisons are all around us. They include household , cosmetics, houseplants, and garden chemicals. The doctor has checked you carefully, but problems can develop later. If you notice any problems or new symptoms, get medical treatment right away. Follow-up care is a key part of your treatment and safety. Be sure to make and go to all appointments, and call your doctor if you are having problems.  It's also a good idea to know your test results and keep a list of the medicines you take. How can you care for yourself at home? Alcohol problems  · Talk to your doctor or counselor about programs that can help you stop using alcohol. · Plan ways to avoid being tempted to drink. ¨ Get rid of all alcohol in your home. ¨ Avoid places where you tend to drink. ¨ Stay away from places or events that offer alcohol. ¨ Stay away from people who drink a lot. Drug problems  · Talk to your doctor about programs that can help you stop using drugs. · Get rid of any drugs you might be tempted to misuse. · Learn how to say no when other people use drugs. · Don't spend time with people who use drugs. Poison prevention  · Keep products in the containers they came in. Keep them with the original labels. · Be careful when you use cleaning products, paints, solvents, and pesticides. Read labels before use. Use a fan to move strong odors and fumes out of your home. · Do not mix cleaning products. Try to use nontoxic . These include vinegar, lemon juice, and baking soda. When should you call for help? Poison control centers, hospitals, or your doctor can give immediate advice in the case of a poisoning. The [facility] number is [phone]. Have the poison container with you so you can give complete information to the poison control center, such as what the poison or substance is, how much was taken and when. Do not try to make the person vomit. ?Call 911 anytime you think you may need emergency care. For example, call if you or someone else:  ? · Has used or currently uses alcohol or drugs and is very confused or can't stay awake. ? · Has passed out (lost consciousness). ? · Has severe trouble breathing. ? · Is having a seizure. ?Call your doctor now or seek immediate medical care if you or someone else:  ? · Has new symptoms, or is not acting normally. ? Watch closely for changes in your health, and be sure to contact your doctor if:  ? · You do not get better as expected. ? · You need help with drug or alcohol problems. ? · You have problems with depression or other mental health issues. Where can you learn more? Go to http://juliet-elizabeth.info/. Enter L956 in the search box to learn more about \"Alcohol, Drug, or Poison Ingestion: Care Instructions. \"  Current as of: 2017  Content Version: 11.4  © 1562-2275 [employer]. Care instructions adapted under license by [employer] (which disclaims liability or warranty for this information). If you have questions about a medical condition or this instruction, always ask your healthcare professional. [address] any warranty or liability for your use of this information. Skimble Activation    Thank you for requesting access to Skimble. Please follow the instructions below to securely access and download your online medical record. Skimble allows you to send messages to your doctor, view your test results, renew your prescriptions, schedule appointments, and more. How Do I Sign Up? 1. In your internet browser, go to [email or web address]  2. Click on the First Time User? Click Here link in the Sign In box. You will be redirect to the New Member Sign Up page. 3. Enter your Skimble Access Code exactly as it appears below. You will not need to use this code after youve completed the sign-up process. If you do not sign up before the expiration date, you must request a new code. Skimble Access Code: [email or web address]  Expires: 6/3/2018  4:55 PM (This is the date your Skimble access code will )    4. Enter the last four digits of your Social Security Number (xxxx) and Date of Birth (mm/dd/yyyy) as indicated and click Submit. You will be taken to the next sign-up page. 5. Create a Skimble ID.  This will be your Skimble login ID and cannot be changed, so think of one that is secure and easy to remember. 6. Create a Citizen[employer]VC password. You can change your password at any time. 7. Enter your Password Reset Question and Answer. This can be used at a later time if you forget your password. 8. Enter your e-mail address. You will receive e-mail notification when new information is available in [address] 9. Click Sign Up. You can now view and download portions of your medical record. 10. Click the Download Summary menu link to download a portable copy of your medical information. Additional Information    If you have questions, please visit the Frequently Asked Questions section of the [email or web address] website at [email or web address] Remember, Citizen[employer]VC is NOT to be used for urgent needs. For medical emergencies, dial 911. Patient armband removed and shredded    DISCHARGE SUMMARY from Nurse    PATIENT INSTRUCTIONS:    After general anesthesia or intravenous sedation, for 24 hours or while taking prescription Narcotics:  · Limit your activities  · Do not drive and operate hazardous machinery  · Do not make important personal or business decisions  · Do  not drink alcoholic beverages  · If you have not urinated within 8 hours after discharge, please contact your surgeon on call. Report the following to your surgeon:  · Excessive pain, swelling, redness or odor of or around the surgical area  · Temperature over 100.5  · Nausea and vomiting lasting longer than 4 hours or if unable to take medications  · Any signs of decreased circulation or nerve impairment to extremity: change in color, persistent  numbness, tingling, coldness or increase pain  · Any questions    What to do at Home:  Recommended activity: Activity as tolerated    If you experience any of the following symptoms nausea, vomiting, chest pain, shortness of breath, fever greater than 100.5 , please follow up with PCP.     *  Please give a list of your current medications to your Primary Care Provider. *  Please update this list whenever your medications are discontinued, doses are      changed, or new medications (including over-the-counter products) are added. *  Please carry medication information at all times in case of emergency situations. These are general instructions for a healthy lifestyle:    No smoking/ No tobacco products/ Avoid exposure to second hand smoke  Surgeon General's Warning:  Quitting smoking now greatly reduces serious risk to your health. Obesity, smoking, and sedentary lifestyle greatly increases your risk for illness    A healthy diet, regular physical exercise & weight monitoring are important for maintaining a healthy lifestyle    You may be retaining fluid if you have a history of heart failure or if you experience any of the following symptoms:  Weight gain of 3 pounds or more overnight or 5 pounds in a week, increased swelling in our hands or feet or shortness of breath while lying flat in bed. Please call your doctor as soon as you notice any of these symptoms; do not wait until your next office visit. Recognize signs and symptoms of STROKE:    F-face looks uneven    A-arms unable to move or move unevenly    S-speech slurred or non-existent    T-time-call 911 as soon as signs and symptoms begin-DO NOT go       Back to bed or wait to see if you get better-TIME IS BRAIN. Warning Signs of HEART ATTACK     Call 911 if you have these symptoms:   Chest discomfort. Most heart attacks involve discomfort in the center of the chest that lasts more than a few minutes, or that goes away and comes back. It can feel like uncomfortable pressure, squeezing, fullness, or pain.  Discomfort in other areas of the upper body. Symptoms can include pain or discomfort in one or both arms, the back, neck, jaw, or stomach.  Shortness of breath with or without chest discomfort.  Other signs may include breaking out in a cold sweat, nausea, or lightheadedness.   Don't wait more than five minutes to call 911 - MINUTES MATTER! Fast action can save your life. Calling 911 is almost always the fastest way to get lifesaving treatment. Emergency Medical Services staff can begin treatment when they arrive -- up to an hour sooner than if someone gets to the hospital by car. The discharge information has been reviewed with the patient. The patient verbalized understanding. Discharge medications reviewed with the patient and appropriate educational materials and side effects teaching were provided.   ___________________________________________________________________________________________________________________________________

## 2018-03-05 NOTE — DISCHARGE SUMMARY
[facility]  Discharge Summary       Patient: Jovita Fisher Age: 30 y.o. : 1980 MR#: [id number] SSN: [id number]  PCP on record: Mendoza Baltazar MD  Admit date: 3/3/2018  Discharge date: 3/5/2018    Disposition:    []Home   []Home with Home Health   []SNF/NH   []Rehab   []Home with family   [x]Alternate Facility: Behavioral medicine    Discharge Diagnoses:                             1.  Suicidal ideation with bupropion and lexapro overdose   2. Depression   3. Tremor   4. Visual distubance    Discharge Medications:     Current Discharge Medication List      CONTINUE these medications which have NOT CHANGED    Details   ibuprofen (MOTRIN) 600 mg tablet Take 1 Tab by mouth every six (6) hours as needed for Pain. Qty: 20 Tab, Refills: 0         STOP taking these medications       escitalopram oxalate (LEXAPRO) 10 mg tablet Comments:   Reason for Stopping:         hydrocortisone (ANUSOL-HC) 2.5 % rectal cream Comments:   Reason for Stopping:             Consults:    - Psychiatry  Procedures:  -   N/A  Significant Diagnostic Studies:   -  None    Hospital Course by Problem   1. Suicidal ideation with bupropion and lexapro overdose - patient presented to ED after swallowing ten wellbutrin and six pills of lexapro for which patient was given charcoal in ED. Patient was monitored with serial EKG and cardiac monitoring without QT prolongation. Patient was continued with IV fluids and ultimately vital signs and labs are stable. Patient was continued with a sitter during hospitalization for safety. 2.  Depression - Patient reports previously being on wellbutrin but this was stopped due to side effects. Was on lexapro prior to admission. Patient denies current suicidal ideation but remains with flat affect. Transfer to inpatient psych admission of which patient is in agreement.     3.  Tremor - Observed last on , did not appear seizure like and conversing throughout interaction. No tremor or abnormal movements observed by this provider or nursing staff this date. 4.  Visual disturbance - patient reports at times having visions which he describes as \"flashbacks\" and are never disturbing / bothersome for patient. No blurred vision / vision changes    Today's examination of the patient revealed:     Subjective:   No current complaints, denies suicidal thoughts \"right now\". Last \"flashback\" at [facility] today. No shortness of breath or pain complaints. Objective:   VS:   Visit Vitals    /81 (BP 1 Location: Left arm, BP Patient Position: Head of bed elevated (Comment degrees))    Pulse 77    Temp 97.7 °F (36.5 °C)    Resp 18    SpO2 98%      Tmax/24hrs: Temp (24hrs), Av °F (36.7 °C), Min:97.1 °F (36.2 °C), Max:99.2 °F (37.3 °C)     Input/Output:   Intake/Output Summary (Last 24 hours) at 18 1704  Last data filed at 18 0904   Gross per 24 hour   Intake                0 ml   Output             2245 ml   Net            -2245 ml     General:  Alert, NAD  Cardiovascular:  RRR  Pulmonary:  LSC throughout  GI:  +BS in all four quadrants, soft, non-tender  Extremities:  No edema; 2+ dorsalis pedis pulses bilaterally  Neuro: Oriented x 4; no abnormal movements noted; strength 5/5 throughout    Labs:    Recent Results (from the past 24 hour(s))   CBC WITH AUTOMATED DIFF    Collection Time: 18 12:41 AM   Result Value Ref Range    WBC 8.2 4.6 - 13.2 K/uL    RBC 4.96 4.70 - 5.50 M/uL    HGB 13.8 13.0 - 16.0 g/dL    HCT 43.1 36.0 - 48.0 %    MCV 86.9 74.0 - 97.0 FL    MCH 27.8 24.0 - 34.0 PG    MCHC 32.0 31.0 - 37.0 g/dL    RDW 12.9 11.6 - 14.5 %    PLATELET 681 385 - 744 K/uL    MPV 9.3 9.2 - 11.8 FL    NEUTROPHILS 60 40 - 73 %    LYMPHOCYTES 28 21 - 52 %    MONOCYTES 10 3 - 10 %    EOSINOPHILS 2 0 - 5 %    BASOPHILS 0 0 - 2 %    ABS. NEUTROPHILS 4.9 1.8 - 8.0 K/UL    ABS. LYMPHOCYTES 2.3 0.9 - 3.6 K/UL    ABS.  MONOCYTES 0.9 0.05 - 1.2 K/UL    ABS. EOSINOPHILS 0.2 0.0 - 0.4 K/UL    ABS. BASOPHILS 0.0 0.0 - 0.1 K/UL    DF AUTOMATED     METABOLIC PANEL, COMPREHENSIVE    Collection Time: 03/05/18 12:41 AM   Result Value Ref Range    Sodium 142 136 - 145 mmol/L    Potassium 3.7 3.5 - 5.5 mmol/L    Chloride 108 100 - 108 mmol/L    CO2 29 21 - 32 mmol/L    Anion gap 5 3.0 - 18 mmol/L    Glucose 89 74 - 99 mg/dL    BUN 9 7.0 - 18 MG/DL    Creatinine 0.91 0.6 - 1.3 MG/DL    BUN/Creatinine ratio 10 (L) 12 - 20      GFR est AA >60 >60 ml/min/1.73m2    GFR est non-AA >60 >60 ml/min/1.73m2    Calcium 8.5 8.5 - 10.1 MG/DL    Bilirubin, total 0.3 0.2 - 1.0 MG/DL    ALT (SGPT) 25 16 - 61 U/L    AST (SGOT) 9 (L) 15 - 37 U/L    Alk. phosphatase 92 45 - 117 U/L    Protein, total 6.7 6.4 - 8.2 g/dL    Albumin 3.4 3.4 - 5.0 g/dL    Globulin 3.3 2.0 - 4.0 g/dL    A-G Ratio 1.0 0.8 - 1.7       Additional Data Reviewed:     Condition:   Follow-up Appointments:   1.  Your PCP: Dakotah Julien MD, within 7days from discharge from behaviorial medicine    >30 minutes spent coordinating this discharge (review instructions/follow-up, prescriptions, preparing report for sign off)    Signed:  Blake Fritz NP  3/5/2018  5:04 PM

## 2018-03-05 NOTE — CONSULTS
Psychiatric Consult Note    Date: 18  Patient Name: Sterling Parker  : 1980  MRN: [id number]    CONSULTING TEAM: medicine  REASON FOR CONSULT:  S/p overdose    HISTORY:   Patient is a 29 yo male hx of depression hospitalized s/p overdose. Patient is somewhat drowsy in conversation, yawning a lot and nodding off at times. He admits to ongoing financial stressors and relationship issues with his parents. He feels some guilt over his decisions, denies acute SI, but still very depressed, hopeless and helpless. He is difficult to keep on topic in conversation and he is concrete and sometimes odd in his speaking vernacular. Denies hallucinations and no ssx of jennifer or psychosis though. PSYCHIATRIC HISTORY:  Has been depressed for a few years, started meds in Oct  Tried 2 doses of wellbutrin, had diarrhea so stopped  Now on lexapro  Has outpatient psychiatrist and therapist    MENTAL STATUS EXAM:  Orientation: oriented to person, place, time, and situation  Appearance: Dressed in hospital gown with fair grooming and hygeine  Behavior: Cooperative with good eye contact  Motor: No psychomotor agitation/retardation  Speech: Normal rate, rhythm, volume  Mood: \"okay\"  Affect: drowsy  Thought Process: tangential, circumstantial  Thought Content: Denies SI and HI  Perception: Denies AH or VH  Concentration: fair  Memory: fair  Cognition: Alert and oriented  Insight: fair  Judgment: fair       ASSESSMENT: Patient is a 29 yo male hx of depression hospitalized s/p overdose. Patient still high risk for self harm and voluntary for psych admission. Diagnosis:  MDD, recurrent, severe without psychosis    Recommendations:  1. continue 1:1 sitter  2. Continue to treat pain adequately, as pain can be a trigger for mood disturbance  3. Once medically clear, transfer to inpatient psych unit       Thank you for this consult. Please call behavioral health for any further concerns. Cinda Cobian, MD

## 2018-03-05 NOTE — PROGRESS NOTES
[facility]  Progress Note    Patient: Mika Aguilera Age: 30 y.o. : 1980 MR#: [id number] SSN: [id number]  Date: 3/5/2018     Subjective:     Patient reports taking wellbutrin (#10 of sister's medication) and lexapro (#6 of his own medication) after having an argument with his parents at home. He has partially reconciled with his parents but states \"it will take a long time to reconcile with my wife\". Denies suicidal ideation \"right now\". States had some suicidal thoughts last year but no action on these thoughts (states he spoke to his  whom then called 911). In remote past with suicidal attempt in  by \"throwing himself in traffic\". Patient states he has never been hospitalized for depression. Reports episodes of Did have a large BM s/p charcoal in ER earlier today. Reports episodes of visual disturbances lessening last episode approx 5AM this morning. Assessment/Plan:   1. Suicidal ideation with bupropion overdose - s/p charcoal in ED. No evidence of QT prolongation, medically stable. Await psych input. 2.  Depression - psych consult, previously on lexapro as outpatient   3. Tremor - non visualized today, patient states occurring at times  4.   Visual disturbances - does not appear to be r/t overdose; await formal psych consult    Regular Diet entered    Additional Notes:      Case discussed with:  [x]Patient  []Family  [x]Nursing  []Case Management  DVT Prophylaxis:  []Lovenox  []Hep SQ  [x]SCDs  []Coumadin   []On Heparin gtt    Objective:   VS:   Visit Vitals    /79 (BP 1 Location: Left arm, BP Patient Position: Head of bed elevated (Comment degrees))    Pulse 77    Temp 98.1 °F (36.7 °C)    Resp 18    SpO2 98%      Tmax/24hrs: Temp (24hrs), Av.1 °F (36.7 °C), Min:97.1 °F (36.2 °C), Max:99.2 °F (37.3 °C)      Intake/Output Summary (Last 24 hours) at 18 1228  Last data filed at 18 0904   Gross per 24 hour   Intake            952.5 ml   Output             2245 ml   Net          -1292.5 ml       General:  Alert, NAD  Cardiovascular:  RRR, mild tachy intermittent, NS per monitor room  Pulmonary:  LSC throughout  GI:  +BS in all four quadrants, soft, non-tender  Extremities:  No edema; 2+ dorsalis pedis pulses bilaterally  Neuro: oriented x 4; no tremor evident during visit    Family contact: Wife Krys Fam [phone]    Labs:    Recent Results (from the past 24 hour(s))   CBC WITH AUTOMATED DIFF    Collection Time: 03/05/18 12:41 AM   Result Value Ref Range    WBC 8.2 4.6 - 13.2 K/uL    RBC 4.96 4.70 - 5.50 M/uL    HGB 13.8 13.0 - 16.0 g/dL    HCT 43.1 36.0 - 48.0 %    MCV 86.9 74.0 - 97.0 FL    MCH 27.8 24.0 - 34.0 PG    MCHC 32.0 31.0 - 37.0 g/dL    RDW 12.9 11.6 - 14.5 %    PLATELET 258 910 - 018 K/uL    MPV 9.3 9.2 - 11.8 FL    NEUTROPHILS 60 40 - 73 %    LYMPHOCYTES 28 21 - 52 %    MONOCYTES 10 3 - 10 %    EOSINOPHILS 2 0 - 5 %    BASOPHILS 0 0 - 2 %    ABS. NEUTROPHILS 4.9 1.8 - 8.0 K/UL    ABS. LYMPHOCYTES 2.3 0.9 - 3.6 K/UL    ABS. MONOCYTES 0.9 0.05 - 1.2 K/UL    ABS. EOSINOPHILS 0.2 0.0 - 0.4 K/UL    ABS. BASOPHILS 0.0 0.0 - 0.1 K/UL    DF AUTOMATED     METABOLIC PANEL, COMPREHENSIVE    Collection Time: 03/05/18 12:41 AM   Result Value Ref Range    Sodium 142 136 - 145 mmol/L    Potassium 3.7 3.5 - 5.5 mmol/L    Chloride 108 100 - 108 mmol/L    CO2 29 21 - 32 mmol/L    Anion gap 5 3.0 - 18 mmol/L    Glucose 89 74 - 99 mg/dL    BUN 9 7.0 - 18 MG/DL    Creatinine 0.91 0.6 - 1.3 MG/DL    BUN/Creatinine ratio 10 (L) 12 - 20      GFR est AA >60 >60 ml/min/1.73m2    GFR est non-AA >60 >60 ml/min/1.73m2    Calcium 8.5 8.5 - 10.1 MG/DL    Bilirubin, total 0.3 0.2 - 1.0 MG/DL    ALT (SGPT) 25 16 - 61 U/L    AST (SGOT) 9 (L) 15 - 37 U/L    Alk.  phosphatase 92 45 - 117 U/L    Protein, total 6.7 6.4 - 8.2 g/dL    Albumin 3.4 3.4 - 5.0 g/dL    Globulin 3.3 2.0 - 4.0 g/dL    A-G Ratio 1.0 0.8 - 1.7       Signed By: Mack Boothe NP     March 5, 2018

## 2018-03-06 LAB
ALBUMIN SERPL-MCNC: 3.7 G/DL (ref 3.4–5)
ALBUMIN/GLOB SERPL: 1 {RATIO} (ref 0.8–1.7)
ALP SERPL-CCNC: 92 U/L (ref 45–117)
ALT SERPL-CCNC: 28 U/L (ref 16–61)
ANION GAP SERPL CALC-SCNC: 6 MMOL/L (ref 3–18)
AST SERPL-CCNC: 8 U/L (ref 15–37)
BASOPHILS # BLD: 0 K/UL (ref 0–0.06)
BASOPHILS NFR BLD: 0 % (ref 0–2)
BILIRUB SERPL-MCNC: 0.5 MG/DL (ref 0.2–1)
BUN SERPL-MCNC: 13 MG/DL (ref 7–18)
BUN/CREAT SERPL: 13 (ref 12–20)
CALCIUM SERPL-MCNC: 9.2 MG/DL (ref 8.5–10.1)
CHLORIDE SERPL-SCNC: 105 MMOL/L (ref 100–108)
CO2 SERPL-SCNC: 30 MMOL/L (ref 21–32)
CREAT SERPL-MCNC: 0.99 MG/DL (ref 0.6–1.3)
DIFFERENTIAL METHOD BLD: ABNORMAL
EOSINOPHIL # BLD: 0.1 K/UL (ref 0–0.4)
EOSINOPHIL NFR BLD: 1 % (ref 0–5)
ERYTHROCYTE [DISTWIDTH] IN BLOOD BY AUTOMATED COUNT: 12.7 % (ref 11.6–14.5)
GLOBULIN SER CALC-MCNC: 3.7 G/DL (ref 2–4)
GLUCOSE SERPL-MCNC: 85 MG/DL (ref 74–99)
HCT VFR BLD AUTO: 47.7 % (ref 36–48)
HGB BLD-MCNC: 16.3 G/DL (ref 13–16)
LYMPHOCYTES # BLD: 1.6 K/UL (ref 0.9–3.6)
LYMPHOCYTES NFR BLD: 21 % (ref 21–52)
MCH RBC QN AUTO: 28.6 PG (ref 24–34)
MCHC RBC AUTO-ENTMCNC: 34.2 G/DL (ref 31–37)
MCV RBC AUTO: 83.8 FL (ref 74–97)
MONOCYTES # BLD: 0.9 K/UL (ref 0.05–1.2)
MONOCYTES NFR BLD: 11 % (ref 3–10)
NEUTS SEG # BLD: 5.3 K/UL (ref 1.8–8)
NEUTS SEG NFR BLD: 67 % (ref 40–73)
PLATELET # BLD AUTO: 233 K/UL (ref 135–420)
PMV BLD AUTO: 9.1 FL (ref 9.2–11.8)
POTASSIUM SERPL-SCNC: 3.9 MMOL/L (ref 3.5–5.5)
PROT SERPL-MCNC: 7.4 G/DL (ref 6.4–8.2)
RBC # BLD AUTO: 5.69 M/UL (ref 4.7–5.5)
SODIUM SERPL-SCNC: 141 MMOL/L (ref 136–145)
TSH SERPL DL<=0.05 MIU/L-ACNC: 0.83 UIU/ML (ref 0.36–3.74)
WBC # BLD AUTO: 7.9 K/UL (ref 4.6–13.2)

## 2018-03-06 PROCEDURE — 84443 ASSAY THYROID STIM HORMONE: CPT | Performed by: STUDENT IN AN ORGANIZED HEALTH CARE EDUCATION/TRAINING PROGRAM

## 2018-03-06 PROCEDURE — 36415 COLL VENOUS BLD VENIPUNCTURE: CPT | Performed by: STUDENT IN AN ORGANIZED HEALTH CARE EDUCATION/TRAINING PROGRAM

## 2018-03-06 PROCEDURE — 74011250637 HC RX REV CODE- 250/637: Performed by: STUDENT IN AN ORGANIZED HEALTH CARE EDUCATION/TRAINING PROGRAM

## 2018-03-06 PROCEDURE — 85025 COMPLETE CBC W/AUTO DIFF WBC: CPT | Performed by: STUDENT IN AN ORGANIZED HEALTH CARE EDUCATION/TRAINING PROGRAM

## 2018-03-06 PROCEDURE — 65220000003 HC RM SEMIPRIVATE PSYCH

## 2018-03-06 PROCEDURE — 80053 COMPREHEN METABOLIC PANEL: CPT | Performed by: STUDENT IN AN ORGANIZED HEALTH CARE EDUCATION/TRAINING PROGRAM

## 2018-03-06 RX ORDER — VENLAFAXINE HYDROCHLORIDE 37.5 MG/1
37.5 CAPSULE, EXTENDED RELEASE ORAL
Status: DISCONTINUED | OUTPATIENT
Start: 2018-03-06 | End: 2018-03-10

## 2018-03-06 RX ADMIN — VENLAFAXINE HYDROCHLORIDE 37.5 MG: 37.5 CAPSULE, EXTENDED RELEASE ORAL at 12:09

## 2018-03-06 NOTE — PROGRESS NOTES
Problem: Suicide/Homicide (Adult/Pediatric)  Goal: *STG: Remains safe in hospital  Pt will remain safe daily while hospitalized. Outcome: Progressing Towards Goal  Pt remains safe. Goal: *STG/LTG: Complies with medication therapy  Pt will take medications as ordered daily while hospitalized. Outcome: Progressing Towards Goal  Pt takes medications as ordered. Problem: Falls - Risk of  Goal: *Absence of Falls  Document Brent Fall Risk and appropriate interventions in the flowsheet. Outcome: Progressing Towards Goal  Fall Risk Interventions  Medication Interventions: Teach patient to arise slowly   Pt remains free of falls. Comments: Patient has been mainly in the milieu today sitting alone quietly. Patient is compliant with medications and states that he is nervous about going home. Patient states that his parents try to control everything he does and now his wife wants nothing else to do with him. Patient states that she and their two sons live there with his parents and he is not certain of what will happen next. Patient continues to endorse passive SI but denies AVH and HI.
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
## 2018-03-06 NOTE — BH NOTES
Patient arrived safely to unit via wheelchair. Patient is A&Ox4 with VSS. Admission process and paperwork was reviewed with the patient; signed paperwork in acknowledgement. Patient's belongings were inventoried and examined for safety; signed in acknowledgement. Patient was given unit tour and shown to room. Patient verbally contracted for safety. Patient presents as well groomed and hygienic. Patient presents with a dull affect and anxious mood. Patient is circumstantial in thought and pressured in speech. Patient reports being unemployed. He is  with two kids. His family lives with his parents. He identifies having communication issues with his parents and wife. Patients reports a hx of depression with outpatient services for help. Patient denies any drug use; UDS - for all drugs.

## 2018-03-06 NOTE — H&P
Psychiatry History and Physical    Subjective:     Date of Evaluation:  3/6/2018    Reason for Referral:  Evelyn Alvarado was referred to the examiners from [facility] for OD . History of Presenting Problem: 36y/o AA female in NAD well developed and nourished. Reports he is not suicidal now. Medical problems: Depression, he reports was diagnosed with enlarged lymph nodes in the past. No prior [facility] admission. Patient Active Problem List    Diagnosis Date Noted    Bupropion overdose 03/04/2018    Suicidal ideation 03/04/2018    Depression 03/04/2018     Past Medical History:   Diagnosis Date    Depression      Past Surgical History:   Procedure Laterality Date    COLONOSCOPY N/A 12/13/2017    COLONOSCOPY (Con-Sed) performed by Conrad Vo [facility] at [facility] ENDOSCOPY    HX CHOLECYSTECTOMY      HX ORTHOPAEDIC      scope right knee       No family history on file. Social History   Substance Use Topics    Smoking status: Never Smoker    Smokeless tobacco: Never Used    Alcohol use No     Prior to Admission medications    Medication Sig Start Date End Date Taking? Authorizing Provider   escitalopram oxalate (LEXAPRO) 20 mg tablet Take 20 mg by mouth daily.    Yes Historical Provider     No Known Allergies     Review of Systems - History obtained from chart review and the patient  General ROS: positive for  - sleep disturbance  Psychological ROS: positive for - depression and sleep disturbances  Respiratory ROS: no cough, shortness of breath, or wheezing  Cardiovascular ROS: no chest pain or dyspnea on exertion  Gastrointestinal ROS: no abdominal pain, change in bowel habits, or black or bloody stools  Genito-Urinary ROS: no dysuria, trouble voiding, or hematuria  Musculoskeletal ROS: positive for - joint pain in the knees  Neurological ROS: no TIA or stroke symptoms  Dermatological ROS: negative      Objective:     Patient Vitals for the past 8 hrs:   BP Temp Pulse Resp   03/06/18 0826 123/81 97.2 °F (36.2 °C) 86 18       Mental Status exam: WNL except for    Sensorium  oriented to time, place and person   Orientation person, place, time/date and situation   Relations cooperative   Eye Contact appropriate   Appearance:  age appropriate and within normal Limits   Motor Behavior:  gait stable and within normal limits   Speech:  normal volume   Vocabulary average   Thought Process: logical   Thought Content free of delusions and free of hallucinations   Suicidal ideations no plan  and no intention   Homicidal ideations no plan  and no intention   Mood:  depressed   Affect:  depressed   Memory recent  adequate   Memory remote:  adequate   Concentration:  adequate   Abstraction:  concrete   Insight:  good   Reliability good   Judgment:  good         Physical Exam:   Visit Vitals    /81 (BP 1 Location: Right arm, BP Patient Position: Sitting)    Pulse 86    Temp 97.2 °F (36.2 °C)    Resp 18    Ht 6' (1.829 m)    Wt 99.3 kg (219 lb)    BMI 29.7 kg/m2     General appearance: alert, cooperative, no distress, appears stated age  Head: Normocephalic, without obvious abnormality, atraumatic  Eyes: negative  Ears: normal TM's and external ear canals AU  Nose: Nares normal. Septum midline. Mucosa normal. No drainage or sinus tenderness. Throat: Lips, mucosa, and tongue normal. Teeth and gums normal  Neck: supple, symmetrical, trachea midline, no adenopathy, thyroid: not enlarged, symmetric, no tenderness/mass/nodules, no carotid bruit and no JVD  Back: symmetric, no curvature. ROM normal. No CVA tenderness. Lungs: clear to auscultation bilaterally  Chest wall: no tenderness  Heart: regular rate and rhythm, S1, S2 normal, no murmur, click, rub or gallop  Abdomen: soft, non-tender.  Bowel sounds normal. No masses,  no organomegaly  Extremities: extremities normal, atraumatic, no cyanosis or edema  Pulses: 2+ and symmetric  Skin: Skin color, texture, turgor normal. No rashes or lesions  Lymph nodes: Cervical, supraclavicular, and axillary nodes normal.  Neurologic: Grossly normal        Impression:      Active Problems:    Depression (3/4/2018)          Plan:     Recommendations for Treatment/Conditions:  Psychiatric treatment recommended while in hospital  Admit to Psych services OD                                             Depression    Referral To: Inpatient psychiatric care    Competency Statement: At the current time, the patient is competent to make informed consent regarding their current medical care and discharge planning and/or financial decisions.       [employer], [place]   3/6/2018 9:43 AM

## 2018-03-06 NOTE — BSMART NOTE
OCCUPATIONAL THERAPY PROGRESS NOTE  Group Time:  2217  Attendance: The patient attended full group. Participation:  The patient participated fully in the activity. Attention:  The patient was able to focus on the activity. Interaction:  The patient acknowledges others or responds to questions,  with no spontaneous interaction. Identified issues in the family, with his wife and wanting a job. Says he has inappropriate type conversations online, has goals for self some of which may be impractical at this time, encouraged to focus on short term goals which seem appropriate. Encouraged to pursue therapy for self, states wife won't go with him.
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
## 2018-03-06 NOTE — BH NOTES
GROUP THERAPY PROGRESS NOTE    Paco Nunez is notissues participating in [place]. Group time: 20 minutes    Personal goal for participation: discussion of unit guidelines/  Did not attend group held, chose to go to bed although not sleeping.

## 2018-03-07 PROCEDURE — 65220000003 HC RM SEMIPRIVATE PSYCH

## 2018-03-07 PROCEDURE — 74011250637 HC RX REV CODE- 250/637: Performed by: STUDENT IN AN ORGANIZED HEALTH CARE EDUCATION/TRAINING PROGRAM

## 2018-03-07 RX ADMIN — VENLAFAXINE HYDROCHLORIDE 37.5 MG: 37.5 CAPSULE, EXTENDED RELEASE ORAL at 08:18

## 2018-03-07 NOTE — BH NOTES
Treatment team met -     Medical Director: _____present   Psychiatrist: _x____present   Charge nurse: x____present   MSW: x_____present   : __x__present   Nurse Manager: _x____present   Student RNs: _____present   Medical Students: _____present   Art Therapist: _x____present   Clinical Coordinator: _x____present   Internal : __x__present   Occupational Therapist: _x____present   : __x_____ present  Crisis Supervisor x  present    Plan of care discussed and updated as appropriate.

## 2018-03-07 NOTE — BSMART NOTE
Pt. Is a 30year old male admitted to this facility for depression and overdose following relationship conflict.     SW discussed case in treatment team     SW Contact:  SW met with pt to discuss d/c planning. The pt. Expressed he is dreading going home . Pt. admits with the restrictions in which his family has placed on him, he is not sure if he is ready. SW discussed positive conflict resolution, positive self talk and martial/family counseling pt stated his spouse is not open to going to counseling. Pt stated my wife said she is not willing to allow someone else to tell her how to run her marriage. SW  Discussed compromising, commitment and expectations. Pt. Was able to talk about his expectations and goals. Pt. declined to allow SW to talk to family. Pt. plans to continue [facility] mental health services with [facility]. Pt. Was anxious and tearful at times.
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
## 2018-03-07 NOTE — BSMART NOTE
OCCUPATIONAL THERAPY PROGRESS NOTE  Group Time:  4548  Attendance: The patient attended full group. Participation:  The patient participated fully in the activity. Attention:  The patient was able to focus on the activity. Interaction:  The patient occasionally  interacts with others. Ruby Bautista he was not looking forward to going home. ID of parents as people he needed to set more limits with, also aware of current situation of living with them. Spoke fondly of his children and said he currently spends a lot of time with them as he is not working.

## 2018-03-07 NOTE — BH NOTES
Patient appears asleep in bed eyes closed. Staff continues to monitor for safety with visual checks Q 15' and provide support as needed.

## 2018-03-07 NOTE — BH NOTES
GROUP THERAPY PROGRESS NOTE    Ulices Hutton is participating in Self-[facility]. Group time: 2 hours    Personal goal for participation: Focus on accepting yourself for who you are and count your blessings one by one.     Goal orientation: personal    Group therapy participation: active    Therapeutic interventions reviewed and discussed: Patient was encouraged by staff    Impression of participation: Happy

## 2018-03-07 NOTE — BH NOTES
GROUP THERAPY PROGRESS NOTE    Jeannine Manuel is participating in [place]. Group time: 30 minutes    Personal goal for participation: explanation of unit guidelines and participation in    treatment    Goal orientation: personal    Group therapy participation: passive    Therapeutic interventions reviewed and discussed:     Impression of participation: Pt sat quietly in group appearing to be listening to what was being said   but voiced no complaints.

## 2018-03-07 NOTE — PROGRESS NOTES
Problem: Suicide/Homicide (Adult/Pediatric)  Goal: *STG: Remains safe in hospital  Pt will remain safe daily while hospitalized. Outcome: Progressing Towards Goal  Pt remains safe. Goal: *STG: Attends activities and groups  Pt will attend at least 3 groups daily while hospitalized. Outcome: Progressing Towards Goal  Pt is attending groups today. Problem: Falls - Risk of  Goal: *Absence of Falls  Document Brent Fall Risk and appropriate interventions in the flowsheet. Outcome: Progressing Towards Goal  Fall Risk Interventions:  Medication Interventions: Teach patient to arise slowly  Pt remains free of falls. Comments: Patient has been sitting quietly in the milieu for most of the morning. Patient will speak when spoken to and has complaints of \"something in my throat I cannot seem to clear\". Patient was instructed to speak with the doctor regarding this. Patient denies SI/HI and AVH and is cooperative but with a flat affect. Patient has attended groups this morning.

## 2018-03-07 NOTE — BH NOTES
Kelly Salguero is  participating in [place]. Group time: 3456    Personal goal for participation: Community announcement    Goal orientation: community    Group therapy participation: fully participated    Therapeutic interventions reviewed and discussed: Staff discussed  Staff discussed the Mental Health programs offered. Unit schedule for groups,  Visiting hours, patient advocate name and phone number and where this information is posted on the unit, etc. Report any maintenance/housekeeping or treatment concerns to staff so it can be addressed by the Treatment Team.    Impression of participation: Pt.did not have any maintenance/housekeeping or treatment concerns to report to staff .

## 2018-03-07 NOTE — BH NOTES
Patient cooperative, contracted for safety, stated that he felt that he had reached a cross road to make decisions about his life, he also stated that he does not want to be decided for again by parents, family or friends and that he was to try to be independent. Patient also voiced concern about not wanting to lose contact with his two sons and he also does not want them to grow up seeing him every time in a depressed state of mind. Patient was encouraged to participate in his treatment and focus on himself at this time to get better. in order to take care of other things in his life. Patient was appreciative that someone listened to him. Patient participated in groups, ate 100% dinner and utilized non skid footwear for safety.

## 2018-03-08 PROBLEM — F33.2 MDD (MAJOR DEPRESSIVE DISORDER), RECURRENT SEVERE, WITHOUT PSYCHOSIS (HCC): Status: ACTIVE | Noted: 2018-03-04

## 2018-03-08 PROCEDURE — 74011250637 HC RX REV CODE- 250/637: Performed by: PSYCHIATRY & NEUROLOGY

## 2018-03-08 PROCEDURE — 74011250637 HC RX REV CODE- 250/637: Performed by: STUDENT IN AN ORGANIZED HEALTH CARE EDUCATION/TRAINING PROGRAM

## 2018-03-08 PROCEDURE — 65220000003 HC RM SEMIPRIVATE PSYCH

## 2018-03-08 RX ORDER — HYDROXYZINE PAMOATE 25 MG/1
25 CAPSULE ORAL
Status: DISCONTINUED | OUTPATIENT
Start: 2018-03-08 | End: 2018-03-12 | Stop reason: HOSPADM

## 2018-03-08 RX ADMIN — HYDROXYZINE PAMOATE 25 MG: 25 CAPSULE ORAL at 20:32

## 2018-03-08 RX ADMIN — VENLAFAXINE HYDROCHLORIDE 37.5 MG: 37.5 CAPSULE, EXTENDED RELEASE ORAL at 08:24

## 2018-03-08 NOTE — PROGRESS NOTES
[address]  Inpatient Progress Note     Date of Service: 03/08/18  Hospital Day: 3     Subjective/Interval History   03/08/18    Treatment Team Notes:  Notes reviewed and/or discussed and report that Alexa Cruz demonstrated no interval concerns. C/o poor sleep. PRNs: none      Patient interview: Alexa Cruz was interviewed by this writer today. C/o poor sleep since starting Effexor. Pt wants to sleep better as he feels it will improve his mood. Pt also c/o allergies. Current denies SI, HI and AVH. Objective     Visit Vitals    /84 (BP 1 Location: Right arm, BP Patient Position: Sitting)    Pulse 96    Temp 97 °F (36.1 °C)    Resp 18    Ht 6' (1.829 m)    Wt 99.3 kg (219 lb)    BMI 29.7 kg/m2       Mental Status Examination     Appearance/Hygiene 30 y.o. AAM with good hygiene. Behavior/Social Relatedness Appropriate   Musculoskeletal Gait/Station: appropriate  Tone (flaccid, cogwheeling, spastic): not assessed  Psychomotor (hyperkinetic, hypokinetic): appropriate   Involuntary movements (tics, dyskinesias, akathisia, stereotypies): none   Speech   Rate, rhythm, volume, fluency and articulation are appropriate   Mood   sad   Affect    sad   Thought Process Linear and goal directed   Thought Content and Perceptual Disturbances Denies self-injurious behavior (SIB), suicidal ideation (SI), aggressive behavior or homicidal ideation (HI)    Denies auditory and visual hallucinations   Sensorium and Cognition  Grossly intact   Insight  fair   Judgment poor        Assessment/Plan      Psychiatric Diagnoses:   Patient Active Problem List   Diagnosis Code    Bupropion overdose T43.291A    Suicidal ideation R45.851    MDD (major depressive disorder), recurrent severe, without psychosis ([facility] [address] F33.2       Medical Diagnoses: Insomnia    Psychosocial and contextual factors:  See HPI    Level of impairment/disability: Moderate    Alexa Cruz is a 30 y.o. who is currently c/o poor sleep related to starting Effexor XR. Pt willing to try vistaril for insomnia. Pt denies SI, HI and AVH. 1.  MDD, recurrent, severe without psychosis   - Continue Effexor XR 37.5mg po Qam  2. Insomnia, Anxiety and seasonal allergies   - Start Vistaril 25mg po TID prn anxiety and insomnia  3. Reviewed instructions, risks, benefits and side effects of medications  4.   Disposition/Discharge Date: self-care/home, 3-    Tim Khan MD DR. [facility]  Psychiatry

## 2018-03-08 NOTE — BSMART NOTE
ART THERAPY GROUP PROGRESS NOTE    PATIENT SCHEDULED FOR GROUP AT: 13:15    ATTENDANCE: Full    PARTICIPATION LEVEL: Participates fully in the art process    ATTENTION LEVEL: Able to focus on task    FOCUS: Goals    SYMBOLIC & THEMATIC CONTENT AS NOTED IN IMAGERY: He was calm, polite, and invested in the task at hand. He presents as slightly socially awkward with peers. He described his tendency to criticize and doubt himself frequently and his need to \"not depend on others for happiness. \" He spoke at length about needing to learn to accept himself, think positively, and stay motivated. He emphasized the important influence and drive/motivation his sons have on him to be a successful person.

## 2018-03-08 NOTE — BSMART NOTE
Pt. Is a 30year old male admitted to this facility for depression and overdose following relationship conflict. SW Contact:  SW met with pt to discuss d/c planning. Pt. Stated currently he is feeling okay. Pt stated when he at times think about how upset his family is with him, he wants to jump out the window. Pt. Was able to rationalize the consequence. SW discussed positive coping skills regarding pt.'s family conflict. SW discussed positive conflict resolution opposed to avoidance. Pt. Is denying hallucinations. The pt. expressed the desire to have a family session. SW will contact pt.'s family to coordinate a family session. Pt stated. Pt. plans to continue oupt mental health services with [facility]. Pt. Was anxious and tearful at times.

## 2018-03-08 NOTE — BH NOTES
Patient did not attend group today, he spent most of his day in his room resting, he ate all meals and took his medication, staff encouraged , and will continue to monitor patient.

## 2018-03-08 NOTE — BH NOTES
Pt has been seclusive at times but did attempt group participation. He voiced no major concerns. Was seen by doctor this   morning and . Will continue to encourage participation in program and encourage safety on unit by wearing non skid socks    and seeking out staff if have any issues. To encourage taking care of personal hygiene. Make staff aware if still unable to sleep.

## 2018-03-08 NOTE — BSMART NOTE
OCCUPATIONAL THERAPY PROGRESS NOTE  Group Time:  3220  Attendance: The patient attended full group. Participation:  The patient participated fully in the activity. Attention:  The patient was able to focus on the activity. Interaction:  The patient occasionally  interacts with others. Revealed he has not been I contact with his family since admission and feels they are still angry at him, reminded that without his input (unclear if he has given it to them) they cannot visit or call. Practiced affirmation as was group topic. Seems to be dreading family contact and encouraged to think about contacting them soon.

## 2018-03-08 NOTE — BH NOTES
GROUP THERAPY PROGRESS NOTE    Luisana Avila is participating in [place]. Group time: 30 minutes  Personal goal for participation: Discussed unit guidelines and Behaviors    Goal orientation: personal    Group therapy participation: active    Therapeutic interventions reviewed and discussed: Encouraged to focus on being safe in hospital   and goals for discharge. Impression of participation: To continue to participate in treatment and to make positive goals for discharge.

## 2018-03-09 PROCEDURE — 74011250637 HC RX REV CODE- 250/637: Performed by: STUDENT IN AN ORGANIZED HEALTH CARE EDUCATION/TRAINING PROGRAM

## 2018-03-09 PROCEDURE — 65220000001 HC RM PRIVATE PSYCH

## 2018-03-09 PROCEDURE — 74011250637 HC RX REV CODE- 250/637: Performed by: PSYCHIATRY & NEUROLOGY

## 2018-03-09 RX ORDER — FLUTICASONE PROPIONATE 50 MCG
2 SPRAY, SUSPENSION (ML) NASAL DAILY
Status: DISCONTINUED | OUTPATIENT
Start: 2018-03-09 | End: 2018-03-12 | Stop reason: HOSPADM

## 2018-03-09 RX ORDER — IPRATROPIUM BROMIDE 21 UG/1
2 SPRAY, METERED NASAL 2 TIMES DAILY
Status: DISCONTINUED | OUTPATIENT
Start: 2018-03-09 | End: 2018-03-12 | Stop reason: HOSPADM

## 2018-03-09 RX ORDER — LORATADINE 10 MG/1
10 TABLET ORAL DAILY
Status: DISCONTINUED | OUTPATIENT
Start: 2018-03-09 | End: 2018-03-12 | Stop reason: HOSPADM

## 2018-03-09 RX ADMIN — TRAZODONE HYDROCHLORIDE 50 MG: 50 TABLET ORAL at 20:55

## 2018-03-09 RX ADMIN — LORATADINE 10 MG: 10 TABLET ORAL at 09:08

## 2018-03-09 RX ADMIN — HYDROXYZINE PAMOATE 25 MG: 25 CAPSULE ORAL at 20:55

## 2018-03-09 RX ADMIN — IPRATROPIUM BROMIDE 2 SPRAY: 21 SPRAY NASAL at 20:46

## 2018-03-09 RX ADMIN — VENLAFAXINE HYDROCHLORIDE 37.5 MG: 37.5 CAPSULE, EXTENDED RELEASE ORAL at 08:47

## 2018-03-09 NOTE — BH NOTES
Patient is well groomed and hygienic. Patient presents with a flat affect and anxious mood. Patient has been cooperative and pleasant throughout shift. During 1:1, patient denied SI/HI and AVH. Patient participated in group therapy and activities. Patient was compliant with evening medications. Patient has not engaged in any hazardous activity that may result in a fall or injury.

## 2018-03-09 NOTE — BH NOTES
Kimberley Hathaway is not participating in Music Therapy. Group time: 1915    Personal goal for participation:  Relax while listening to Aromatherapy music    Goal orientation: relaxation    Group therapy participation: refuse    Therapeutic interventions reviewed and discussed: Staff encouraged Pt. To participate in listening to soft music    Impression of participation:  Pt. Refuse chose to  Participate in recreation off the unit. rest in bed despite staff encouragement.

## 2018-03-09 NOTE — BSMART NOTE
OCCUPATIONAL THERAPY PROGRESS NOTE  Group Time:  2397  Attendance: The patient attended full group. Participation:  The patient participated fully in the activity. Attention:  The patient was able to focus on the activity. Interaction:  The patient occasionally  interacts with others. .Somewhat avoidant of own issues except joblessness. Able to participate in discussion on stress management.

## 2018-03-09 NOTE — BSMART NOTE
Pt. Is a 30year old male admitted to this facility for depression and overdose following relationship conflict. SW discussed case with the treating psychiatrist    SW Contact:  SW met with pt to discuss d/c planning. Pt. Stated he is starting to feel emotionally better. Pt. stated he realizes now, he can make decision for self. Pt. Stated he feels empowered to talk to his family. Pt stated he plans to set boundaries. SW dicussed positive coping skills, gaol setting and conflict resolution. Pt. Provided SW with his spouse's contact phone number for a session. Pt. Denies ideations and hallucinations. Pt. Was cooperative and gaol oriented. TARYN Collateral: TARYN attempted to contact  BEBA Herrera pt.'s spouse @ [phone] vgy [phone]. SW addressed the spouse's concerns. The spouse plans to come to for a family session on 3/12/18 @ 1:00 p.m. Sena Munoz

## 2018-03-09 NOTE — BH NOTES
Lorie Wallis is  participating in [place]. Group time: 4230    Personal goal for participation: Community announcement    Goal orientation: community    Group therapy participation: fully participated    Therapeutic interventions reviewed and discussed: Staff discussed  Staff discussed the Mental Health programs offered. Unit schedule for groups,  Visiting hours, patient advocate name and phone number and where this information is posted on the unit, etc. Report any maintenance/housekeeping or treatment concerns to staff so it can be addressed by the Treatment Team.    Impression of participation: Pt.did not have any maintenance/housekeeping or treatment concerns to report to staff .

## 2018-03-09 NOTE — PROGRESS NOTES
Problem: Suicide/Homicide (Adult/Pediatric)  Goal: *STG: Remains safe in hospital  Pt will remain safe daily while hospitalized. Outcome: Progressing Towards Goal  Remained free from harm or injury during shift of care. Goal: *STG/LTG: Complies with medication therapy  Pt will take medications as ordered daily while hospitalized. Outcome: Progressing Towards Goal  Compliant with evening medication regiment. Goal: *STG/LTG:  No longer expresses self destructive or suicidal/homicidal thoughts  Pt will deny SI/HI daily while hospitalized. Outcome: Progressing Towards Goal  Denied SI/HI during 1:1    Comments: Remained free from harm or injury during shift of care. Compliant with evening medication regiment.     Denied SI/HI during 1:1

## 2018-03-09 NOTE — PROGRESS NOTES
Problem: Suicide/Homicide (Adult/Pediatric)  Goal: *STG: Remains safe in hospital  Pt will remain safe daily while hospitalized. Outcome: Progressing Towards Goal  Patient has remand free of harm to self and others while in facility. Goal: *STG/LTG: Complies with medication therapy  Pt will take medications as ordered daily while hospitalized. Outcome: Progressing Towards Goal  Patient has been compliant with prescribed medications. Goal: *STG/LTG:  No longer expresses self destructive or suicidal/homicidal thoughts  Pt will deny SI/HI daily while hospitalized. Outcome: Progressing Towards Goal  Patient denies suicidal ideation. Comments: Patient awakened for medication and breakfast. Patient complained of allergy symptoms to include \"nasal stuffiness, runny eyes and post nasal drip\". Patient to receive Claritin per approved list. Patient reported feeling \"alright\" stating \"sleep was just alright, I just couldn't get comfortable\". Patient stated \"I'm not a big fan of going back home because my family will maybe stress me out again\". Patient denies auditory hallucinations. Denies suicidal ideation with no safety issues noted. Will continue to monitor for safety with support as needed throughout treatment regimen. MD advised of patient's allergy symptoms with orders for patient to receive Flonase.

## 2018-03-09 NOTE — PROGRESS NOTES
[address]  Inpatient Progress Note     Date of Service: 03/09/18  Hospital Day: 4     Subjective/Interval History   03/09/18    Treatment Team Notes:  Notes reviewed and/or discussed and report that Candie Bar demonstrated no interval concerns. Denies SI, HI & AVH. Wants family session per SW. PRNs: Vistaril    Patient interview: Candie Bar was interviewed by this writer today. Discussed numerous family issues without this provider asking. Sleep is uncharged but pt notes vistaril caused him to be fatigue. States mood is better. Pt is awaiting family meeting. Continues to c/o allergies. Denies SI, HI and AVH. Objective     Visit Vitals    /89 (BP 1 Location: Right arm, BP Patient Position: Sitting)    Pulse 92    Temp 97.8 °F (36.6 °C)    Resp 18    Ht 6' (1.829 m)    Wt 99.3 kg (219 lb)    BMI 29.7 kg/m2       Mental Status Examination     Appearance/Hygiene 30 y.o. AAM with good hygiene. Behavior/Social Relatedness Appropriate   Musculoskeletal Gait/Station: appropriate  Tone (flaccid, cogwheeling, spastic): not assessed  Psychomotor (hyperkinetic, hypokinetic): appropriate   Involuntary movements (tics, dyskinesias, akathisia, stereotypies): none   Speech   Rate, rhythm, volume, fluency and articulation are appropriate   Mood   restricted   Affect    restricted   Thought Process Perseverative     Thought Content and Perceptual Disturbances Family issues/concerns.       Denies self-injurious behavior (SIB), suicidal ideation (SI), aggressive behavior or homicidal ideation (HI)    Denies auditory and visual hallucinations   Sensorium and Cognition  Grossly intact   Insight  fair   Judgment poor        Assessment/Plan      Psychiatric Diagnoses:   Patient Active Problem List   Diagnosis Code    Bupropion overdose T43.18A    Suicidal ideation R45.851    MDD (major depressive disorder), recurrent severe, without psychosis ([facility] [address] F33.2 Medical Diagnoses: Insomnia    Psychosocial and contextual factors:  See HPI    Level of impairment/disability: Moderate    Leila Munoz is a 30 y.o. who is currently c/o poor sleep related to starting Effexor XR. Will not increase Effexor at this time. Awaiting a family meeting. Pt willing to try vistaril for insomnia. Pt denies SI, HI and AVH. 1.  MDD, recurrent, severe without psychosis   - Continue Effexor XR 37.5mg po Qam  2. Insomnia, Anxiety and seasonal allergies   - Start Vistaril 25mg po TID prn anxiety and insomnia  3. Seasonal allergies   - start Nasacort and ipratropium nasal sprays as indicated.    4.  Reviewed instructions, risks, benefits and side effects of medications  45  Disposition/Discharge Date: self-care/home, 3-    Marina Moreira MD DR. [facility]  Psychiatry

## 2018-03-10 PROCEDURE — 74011250637 HC RX REV CODE- 250/637: Performed by: PSYCHIATRY & NEUROLOGY

## 2018-03-10 PROCEDURE — 65220000001 HC RM PRIVATE PSYCH

## 2018-03-10 PROCEDURE — 74011250637 HC RX REV CODE- 250/637: Performed by: STUDENT IN AN ORGANIZED HEALTH CARE EDUCATION/TRAINING PROGRAM

## 2018-03-10 RX ORDER — VENLAFAXINE HYDROCHLORIDE 75 MG/1
75 CAPSULE, EXTENDED RELEASE ORAL
Status: DISCONTINUED | OUTPATIENT
Start: 2018-03-11 | End: 2018-03-12 | Stop reason: HOSPADM

## 2018-03-10 RX ADMIN — IPRATROPIUM BROMIDE 2 SPRAY: 21 SPRAY NASAL at 08:33

## 2018-03-10 RX ADMIN — VENLAFAXINE HYDROCHLORIDE 37.5 MG: 37.5 CAPSULE, EXTENDED RELEASE ORAL at 08:33

## 2018-03-10 RX ADMIN — IPRATROPIUM BROMIDE 2 SPRAY: 21 SPRAY NASAL at 20:35

## 2018-03-10 RX ADMIN — TRAZODONE HYDROCHLORIDE 50 MG: 50 TABLET ORAL at 20:36

## 2018-03-10 RX ADMIN — LORATADINE 10 MG: 10 TABLET ORAL at 08:33

## 2018-03-10 RX ADMIN — FLUTICASONE PROPIONATE 2 SPRAY: 50 SPRAY, METERED NASAL at 08:34

## 2018-03-10 NOTE — BH NOTES
Pt transferred from 124/1 to 113/1 due to light out in bathroom. Maintenance came and do not have  A replacement light. Will change light on  Monday.

## 2018-03-10 NOTE — PROGRESS NOTES
[address]  Inpatient Progress Note     Date of Service: 03/10/18  Hospital Day: 5     Subjective/Interval History   03/10/18    Treatment Team Notes:  Notes reviewed and/or discussed and report that Robin Nice demonstrated no interval concerns. Family session scheduled for 3-12-18. PRNs: See MAR    Patient interview: Robin Nice was interviewed by this writer today. Pt notes he is feeling better. States allergy medications are helpful. Describes mood as fine. Sleep improved. Appetite is stable. Pt denies medication side effects. Pt looking forward to family meeting. Denies SI, HI and AVH. Objective     Visit Vitals    /84 (BP 1 Location: Right arm, BP Patient Position: Sitting)    Pulse 98    Temp 97.2 °F (36.2 °C)    Resp 18    Ht 6' (1.829 m)    Wt 99.3 kg (219 lb)    BMI 29.7 kg/m2       Mental Status Examination     Appearance/Hygiene 30 y.o. AAM with good hygiene. Behavior/Social Relatedness Appropriate   Musculoskeletal Gait/Station: appropriate  Tone (flaccid, cogwheeling, spastic): not assessed  Psychomotor (hyperkinetic, hypokinetic): appropriate   Involuntary movements (tics, dyskinesias, akathisia, stereotypies): none   Speech   Rate, rhythm, volume, fluency and articulation are appropriate   Mood   \"I'm ok. \"    Affect    restricted   Thought Process Linear and goal directed. Thought Content and Perceptual Disturbances Family issues/concerns.       Denies self-injurious behavior (SIB), suicidal ideation (SI), aggressive behavior or homicidal ideation (HI)    Denies auditory and visual hallucinations   Sensorium and Cognition  Grossly intact   Insight  fair   Judgment fair        Assessment/Plan      Psychiatric Diagnoses:   Patient Active Problem List   Diagnosis Code    Bupropion overdose T43.18A    Suicidal ideation R45.851    MDD (major depressive disorder), recurrent severe, without psychosis ([facility] [address] F33.2       Medical Diagnoses: Insomnia    Psychosocial and contextual factors:  See HPI    Level of impairment/disability: Moderate    Migdalia Mcdowell is a 30 y.o. who is currently improving. Sleep is improved. Awaiting family meeting scheduled for 3-. Pt willing to increase Effexor XR on 3- to 75mg po Qam.      1.  MDD, recurrent, severe without psychosis   - Continue Effexor XR 37.5mg po Qam (increase to 75mg po Qam on 3-). 2.  Insomnia, Anxiety and seasonal allergies   - Continue Vistaril 25mg po TID prn anxiety and insomnia  3. Seasonal allergies   - Continue  Nasacort and ipratropium nasal sprays as indicated.    4.  Reviewed instructions, risks, benefits and side effects of medications  45  Disposition/Discharge Date: self-care/home, 3-    Jamar Botello MD DR. [facility]  Psychiatry

## 2018-03-10 NOTE — BH NOTES
GROUP THERAPY PROGRESS NOTE    Eugenia Maurer is not  participating in [place]. Staff encouraged and pt declined.

## 2018-03-10 NOTE — PROGRESS NOTES
Problem: Suicide/Homicide (Adult/Pediatric)  Goal: *STG: Remains safe in hospital  Pt will remain safe daily while hospitalized. Outcome: Progressing Towards Goal  Pt remains safe. Goal: *STG/LTG: Complies with medication therapy  Pt will take medications as ordered daily while hospitalized. Outcome: Progressing Towards Goal  Pt takes medications as ordered. Problem: Falls - Risk of  Goal: *Absence of Falls  Document Brent Fall Risk and appropriate interventions in the flowsheet. Outcome: Progressing Towards Goal  Fall Risk Interventions:  Medication Interventions: Teach patient to arise slowly  Pt remains free of falls. Comments: Patient has been mainly in his room resting today and comes out for meals only. Patient is compliant with medications and continues to state that he is worried about going home due to the environment there with his parents. Patient endorses depression due to this but denies active SI/HI and AVH.

## 2018-03-11 PROCEDURE — 74011250637 HC RX REV CODE- 250/637: Performed by: PSYCHIATRY & NEUROLOGY

## 2018-03-11 PROCEDURE — 65220000001 HC RM PRIVATE PSYCH

## 2018-03-11 RX ADMIN — VENLAFAXINE HYDROCHLORIDE 75 MG: 75 CAPSULE, EXTENDED RELEASE ORAL at 08:29

## 2018-03-11 RX ADMIN — IPRATROPIUM BROMIDE 2 SPRAY: 21 SPRAY NASAL at 08:28

## 2018-03-11 RX ADMIN — FLUTICASONE PROPIONATE 2 SPRAY: 50 SPRAY, METERED NASAL at 08:28

## 2018-03-11 RX ADMIN — LORATADINE 10 MG: 10 TABLET ORAL at 08:29

## 2018-03-11 RX ADMIN — IPRATROPIUM BROMIDE 2 SPRAY: 21 SPRAY NASAL at 21:23

## 2018-03-11 NOTE — PROGRESS NOTES
[address]  Inpatient Progress Note     Date of Service: 03/11/18  Hospital Day: 6     Subjective/Interval History   03/11/18    Treatment Team Notes:  Notes reviewed and/or discussed and report that Mika Aguilera demonstrated no interval concerns. Family session scheduled for 3-12-18. PRNs: See MAR    Patient interview: Mika Aguilera was interviewed by this writer today. Pt notes he is feeling better. Describes mood as fine and he is feeling more positive. Sleep and appetite are stable and adequate. Pt denies medication side effects. Pt looking forward to family meeting. Denies SI, HI and AVH. Objective     Visit Vitals    /84 (BP 1 Location: Right arm, BP Patient Position: Sitting)    Pulse 98    Temp 97.2 °F (36.2 °C)    Resp 18    Ht 6' (1.829 m)    Wt 99.3 kg (219 lb)    BMI 29.7 kg/m2       Mental Status Examination     Appearance/Hygiene 30 y.o. AAM with good hygiene. Behavior/Social Relatedness Appropriate   Musculoskeletal Gait/Station: appropriate  Tone (flaccid, cogwheeling, spastic): not assessed  Psychomotor (hyperkinetic, hypokinetic): appropriate   Involuntary movements (tics, dyskinesias, akathisia, stereotypies): none   Speech   Rate, rhythm, volume, fluency and articulation are appropriate   Mood   \"I'm fine. \"    Affect    restricted   Thought Process Linear and goal directed. Thought Content and Perceptual Disturbances Family issues/concerns.       Denies self-injurious behavior (SIB), suicidal ideation (SI), aggressive behavior or homicidal ideation (HI)    Denies auditory and visual hallucinations   Sensorium and Cognition  Grossly intact   Insight  fair   Judgment fair        Assessment/Plan      Psychiatric Diagnoses:   Patient Active Problem List   Diagnosis Code    Bupropion overdose T43.18A    Suicidal ideation R45.851    MDD (major depressive disorder), recurrent severe, without psychosis ([facility] [address] F33.2       Medical Diagnoses: Insomnia    Psychosocial and contextual factors:  See HPI    Level of impairment/disability: Moderate    Earnest Tex is a 30 y.o. who is currently improving. Sleep is improved. Awaiting family meeting scheduled for 3-. Instructed pt he may use vistaril prior to family meeting if anxiety is a concern. 1.  MDD, recurrent, severe without psychosis   - Increase Effexor XR to 75mg po Qam   2. Insomnia, Anxiety and seasonal allergies   - Continue Vistaril 25mg po TID prn anxiety and insomnia  3. Seasonal allergies   - Continue  Nasacort and ipratropium nasal sprays as indicated.    4.  Reviewed instructions, risks, benefits and side effects of medications  45  Disposition/Discharge Date: self-care/home, 3-    Jason Nguyen MD DR. [facility]  Psychiatry

## 2018-03-11 NOTE — BH NOTES
Patient has been cooperative and pleasant throughout the shift, spending most of the evening watching TV and talking with peers. Patient participated in group and had no visitors. During the evening, after a phone call that appeared to go well, Patient sat at the table holding his head, shaking his legs and mumbling loudly to himself. Nurse attempted to call Writer but he didn't respond, Writer touched Patient's shoulder and called his name and he jumped a little a said everything was ok. Patient had no issues the rest of the shift. Patient has been medication compliant, ate 100% of meals and snacks and wore non-slip footwear throughout the shift. Patient had no falls this evening.

## 2018-03-11 NOTE — BH NOTES
GROUP THERAPY PROGRESS NOTE    Hueykym Shaheed is participating in [place]. Group time: 20 minutes    Personal goal for participation:Goals and discharge plans    Goal orientation: personal    Group therapy participation: minimal    Therapeutic interventions reviewed and discussed: Encouraged to focus on self and   what will be different and more helpful when discharged. Keep follow up treatment and continue   working on family issues. Impression of participation: Sat quietly in group without voicing any concerns or complaints.

## 2018-03-11 NOTE — BH NOTES
GROUP THERAPY PROGRESS NOTE    Martín Sims is participating in Coping Skills Group. Group time: 45 minutes    Personal goal for participation: Coping Skills [place]    Goal orientation: personal    Group therapy participation: active    Therapeutic interventions reviewed and discussed: Patients played coping skills [place] and then later discussed appropriate coping skills and what they use at home. Impression of participation: Patient actively participated.

## 2018-03-11 NOTE — PROGRESS NOTES
Problem: Suicide/Homicide (Adult/Pediatric)  Goal: *STG: Remains safe in hospital  Pt will remain safe daily while hospitalized. Outcome: Progressing Towards Goal  Pt remains safe. Goal: *STG/LTG: Complies with medication therapy  Pt will take medications as ordered daily while hospitalized. Outcome: Progressing Towards Goal  Pt takes medications as ordered. Problem: Falls - Risk of  Goal: *Absence of Falls  Document Brent Fall Risk and appropriate interventions in the flowsheet. Outcome: Progressing Towards Goal  Fall Risk Interventions:  Medication Interventions: Teach patient to arise slowly  Pt remains free of falls. Comments: Patient has been more social today and has been sitting in the milieu for most of the morning. Patient interacts appropriately with peers and appears much brighter today. Patient is compliant with medications and has not expressed any concerns. Patient denies active SI/HI and AVH but does admit to some concern and anxiety about his home situation and discharge.

## 2018-03-12 VITALS
DIASTOLIC BLOOD PRESSURE: 97 MMHG | BODY MASS INDEX: 29.66 KG/M2 | WEIGHT: 219 LBS | SYSTOLIC BLOOD PRESSURE: 124 MMHG | HEART RATE: 86 BPM | TEMPERATURE: 98.3 F | RESPIRATION RATE: 18 BRPM | HEIGHT: 72 IN

## 2018-03-12 PROCEDURE — 74011250637 HC RX REV CODE- 250/637: Performed by: PSYCHIATRY & NEUROLOGY

## 2018-03-12 RX ORDER — IPRATROPIUM BROMIDE 21 UG/1
2 SPRAY, METERED NASAL 2 TIMES DAILY
Qty: 30 ML | Refills: 0 | Status: SHIPPED | OUTPATIENT
Start: 2018-03-12 | End: 2020-05-20

## 2018-03-12 RX ORDER — HYDROXYZINE PAMOATE 25 MG/1
25 CAPSULE ORAL
Qty: 45 CAP | Refills: 0 | Status: SHIPPED | OUTPATIENT
Start: 2018-03-12 | End: 2018-03-26

## 2018-03-12 RX ORDER — LORATADINE 10 MG/1
10 TABLET ORAL DAILY
Qty: 30 TAB | Refills: 0 | Status: SHIPPED | OUTPATIENT
Start: 2018-03-12 | End: 2020-12-28

## 2018-03-12 RX ORDER — VENLAFAXINE HYDROCHLORIDE 75 MG/1
75 CAPSULE, EXTENDED RELEASE ORAL
Qty: 30 CAP | Refills: 0 | Status: ON HOLD | OUTPATIENT
Start: 2018-03-13 | End: 2018-05-22

## 2018-03-12 RX ORDER — FLUTICASONE PROPIONATE 50 MCG
2 SPRAY, SUSPENSION (ML) NASAL DAILY
Qty: 1 BOTTLE | Refills: 0 | Status: SHIPPED | OUTPATIENT
Start: 2018-03-12 | End: 2020-05-20

## 2018-03-12 RX ORDER — TRAZODONE HYDROCHLORIDE 50 MG/1
50 TABLET ORAL
Qty: 15 TAB | Refills: 0 | Status: ON HOLD | OUTPATIENT
Start: 2018-03-12 | End: 2018-05-22

## 2018-03-12 RX ADMIN — LORATADINE 10 MG: 10 TABLET ORAL at 09:06

## 2018-03-12 RX ADMIN — HYDROXYZINE PAMOATE 25 MG: 25 CAPSULE ORAL at 09:37

## 2018-03-12 RX ADMIN — HYDROXYZINE PAMOATE 25 MG: 25 CAPSULE ORAL at 12:14

## 2018-03-12 RX ADMIN — IPRATROPIUM BROMIDE 2 SPRAY: 21 SPRAY NASAL at 09:06

## 2018-03-12 RX ADMIN — VENLAFAXINE HYDROCHLORIDE 75 MG: 75 CAPSULE, EXTENDED RELEASE ORAL at 06:31

## 2018-03-12 RX ADMIN — FLUTICASONE PROPIONATE 2 SPRAY: 50 SPRAY, METERED NASAL at 09:06

## 2018-03-12 NOTE — BSMART NOTE
SOCIAL WORK GROUP THERAPY PROGRESS NOTE    Group Time:  11:15am       Group Topic:  Coping Skills    C D Issues    Group Participation:    Actively participated in group discussion. Was very active & offered support to peers. Looked at the \"Process of setting Goals\", the value of a \"daily\" /  \"weekly\" schedule as tool to build self esteem, sharpen decision making skills and help define one's reality. Admits this \"has to get better\". \"Too often I hold things in & my depression gets worse\". Discussion included the process of making \"Change\" by answering questions on handout with an emphasis on strengths & weaknesses to support improving one's self esteem. Saw positives as his sense of humor & love of people. Reviewed strategies to keep a \"Journal\" for moods, cognitions, behavior & outcome.

## 2018-03-12 NOTE — BH NOTES
Pt has been calm and cooperative. He has been interacting with peers. He denies suicidal/homicidal ideations. He stated he is anxious about his family sessions tomorrow. He stated \"I have so many issues I need to talk about\". He attended group and actively participated. He ate dinner, snack and medication compliant. Nursing will continue to provide a safe and supportive environment.

## 2018-03-12 NOTE — BH NOTES
GROUP THERAPY PROGRESS NOTE    Paco Nunez is participating in [place]. Group time: 35 minutes    Personal goal for participation: rules/ regulations    Goal orientation: community    Group therapy participation: active    Therapeutic interventions reviewed and discussed: He was encouraged to talk to his family about his stressors.      Impression of participation: He was alert and oriented during group he focused on his family session and getting things off his chest.

## 2018-03-12 NOTE — PROGRESS NOTES
NUTRITION    Nutrition Screen    RECOMMENDATIONS / PLAN:     - No nutrition intervention indicated at this time. Will re-screen as appropriate. NUTRITION DIAGNOSIS & INTERVENTIONS:     Nutrition Diagnosis:  No nutrition diagnosis at this time. ASSESSMENT:     Pt unavailable during time of visit, in family session. Stable appetite and meal intake. Tolerating diet. Weight gain PTA noted per chart hx review. No nutritional concerns at this time. Average intake adequate to meet patients estimated nutritional needs:   [x] Yes     [] No      [] Unable to determine at this time    Diet: DIET REGULAR    Food Allergies: NKFA  Current Appetite:   [] Good     [] Fair     [] Poor     [x] Other: stable  Appetite/meal intake prior to admission:   [] Good     [] Fair     [] Poor     [x] Other: unknown   Feeding Limitations:  [] Swallowing Difficulty       [] Chewing Difficulty       [] Other   Current Meal Intake: No data found. Gastrointestinal Issues:  [] Yes    [x] No   Skin Integrity:  WDL    Pertinent Medications:  Reviewed   Labs:  Reviewed     Anthropometrics:  Ht Readings from Last 1 Encounters:   03/05/18 6' (1.829 m)       Last 3 Recorded Weights in this Encounter    03/05/18 1830   Weight: 99.3 kg (219 lb)       Body mass index is 29.7 kg/(m^2). Weight History: Weight gain noted PTA per chart hx review.    Weight Metrics 3/5/2018 12/13/2017 11/9/2017 10/18/2017 10/11/2017   Weight 219 lb 215 lb 205 lb 206 lb 200 lb   BMI 29.7 kg/m2 29.99 kg/m2 28.59 kg/m2 28.73 kg/m2 -       Admitting Diagnosis: Depression  Depression  Past Medical History:   Diagnosis Date    Depression     [facility]        Education Needs:        [x] None identified  [] Identified - Not appropriate at this time  []  Identified and addressed - refer to education log  Learning Limitations:   [x] None identified  [] Identified    Cultural, Adventism & ethnic food preferences identified:  [x] None    [] Yes      ESTIMATED NUTRITION NEEDS:     7659-8049 kcal (MSJx1.2-1.3), 79-99 gm protein (0.8-1 gm/kg), 1 mL/kcal  Based on:  99 kg       [x] Actual BW      [] IBW          MONITORING & EVALUATION:     Nutrition Goal(s):   1. Po intake of meals will meet >75% of patient estimated nutritional needs within the next 7 days.   Outcome:   [] Met    []  Not Met   [x] New/Initial Goal    Monitor:  [x] Food and beverage intake   [x] Diet order   [x] Nutrition-focused physical findings   [] Weight      Previous Recommendations (for follow-up assessments only):     []   Implemented       []   Not Implemented (RD to address)   [] No Longer Appropriate   [] No Recommendation Made       Discharge Planning: regular diet   [x]  Participated in care planning, discharge planning, & interdisciplinary rounds as appropriate      Maria Luisa Ramesh RD   Pager: [phone]

## 2018-03-12 NOTE — BSMART NOTE
Pt. Is a 30year old male admitted to this facility for depression and overdose following relationship conflict. SW discussed case with the treating psychiatrist     SW Contact:  SW met with pt to discuss d/c plan. Pt. denies ideations and hallucinations. The pt. plans to follow-up aftercare with  therapy appointment scheduled with Bebe Toure @ 3/16/18 @ 2:00 and Dr. Gabrielle Beatty on 3/29/18 @ 12:45 am [facility] [address]  [place], [facility]   [phone]. Pt. Can make an appointment at own discretion for Asperger testing as self pay patient at one of the following facilities:    [address], [address] [address]    [phone] or     Or [address]  [address] [facility] [place], [address]  [phone]    Family Session Note:  Pt. Met with pt.'s spouse and [pt for today's family session. Pt. Was able to discuss the reason for this admission. Pt apologized to his spouse about his indiscretions. The pt,. Was able to communicate his feelings. Pt. spouse stated she would support and help pt with his poor self-image, help to explore support groups and help parents understand the pt.'s disabilities. The pt. Talked about his marital status versus being single, desire too make choices for self and family and explore psychological testing. The spouse stated when she talked to the pt.'s parents stated pt received OT when he was young. The OT was to enhance socialization. The pt. plans to follow-up with questions with his parents. Pt. 's session went well. Pt is currently denying ideations and hallucination. The pt. ans spouse were provided information support groups, job resources and supportive services.

## 2018-03-12 NOTE — BH NOTES
Patient requested Vistaril due to increasing anxiety regarding his family session. Vistaril 25mg administered per order.
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
## 2018-03-12 NOTE — BH NOTES
Patient is being discharged off unit with his belongings, discharge paperwork and prescriptions. Patient is going home woth his parents after their family session.

## 2018-03-12 NOTE — DISCHARGE INSTRUCTIONS
BEHAVIORAL HEALTH NURSING DISCHARGE NOTE      Emergency Numbers    : [facility] Emergency Services: [phone]  Suicide Prevention Line: 1 [phone] (TALK)      The following personal items collected during your admission are returned to you:   Dental Appliance: Dental Appliances: None  Vision: Visual Aid: Glasses  Hearing Aid:    Jewelry: Jewelry: None  Clothing: Clothing: Footwear, Jacket/Coat, Pants, Shirt, Undergarments  Other Valuables: Other Valuables: Gaylyn Cassette sent to safe: Personal Items Sent to Safe: twelve dollars cash        The discharge information has been reviewed with the patient. The patient verbalized understanding. ProNerve Activation    Thank you for requesting access to ProNerve. Please follow the instructions below to securely access and download your online medical record. ProNerve allows you to send messages to your doctor, view your test results, renew your prescriptions, schedule appointments, and more. How Do I Sign Up? In your internet browser, go to [email or web address]  Click on the First Time User? Click Here link in the Sign In box. You will be redirect to the New Member Sign Up page. Enter your ProNerve Access Code exactly as it appears below. You will not need to use this code after youve completed the sign-up process. If you do not sign up before the expiration date, you must request a new code. ProNerve Access Code: [email or web address]  Expires: 6/3/2018  5:55 PM (This is the date your ProNerve access code will )    Enter the last four digits of your Social Security Number (xxxx) and Date of Birth (mm/dd/yyyy) as indicated and click Submit. You will be taken to the next sign-up page. Create a ProNerve ID. This will be your ProNerve login ID and cannot be changed, so think of one that is secure and easy to remember. Create a ProNerve password. You can change your password at any time.   Enter your Password Reset Question and Answer. This can be used at a later time if you forget your password. Enter your e-mail address. You will receive e-mail notification when new information is available in [address] Click Sign Up. You can now view and download portions of your medical record. Click the [employer] link to download a portable copy of your medical information. Additional Information    If you have questions, please visit the Frequently Asked Questions section of the Pay4later website at [email or web address] Remember, Pay4later is NOT to be used for urgent needs. For medical emergencies, dial 911.     Patient armband removed and shredded

## 2018-03-12 NOTE — PROGRESS NOTES
conducted an initial consultation and Spiritual Assessment for Leila Munoz, who is a 30 y. o.,male. Patients Primary Language is: [place]. According to the patients EMR Episcopalian Affiliation is: No preference. The reason the Patient came to the hospital is:   Patient Active Problem List    Diagnosis Date Noted    Bupropion overdose 03/04/2018    Suicidal ideation 03/04/2018    MDD (major depressive disorder), recurrent severe, without psychosis ([facility] [address] 03/04/2018        The  provided the following Interventions:  Initiated a relationship of care and support. Explored issues of ryne, belief, spirituality and Sabianism/ritual needs while hospitalized. Listened empathically. Provided information about Spiritual Care Services. Offered prayer and assurance of continued prayers on patient's behalf. Chart reviewed. The following outcomes where achieved:  Patient shared limited information about both their medical narrative and spiritual journey/beliefs.  confirmed Patient's Episcopalian Affiliation. Patient processed feeling about current hospitalization. Patient expressed gratitude for 's visit. Assessment:  Patient is concern about his family and how they react to him. Plan:  Chaplains will continue to follow and will provide pastoral care on an as needed/requested basis.     [address]  ([phone])

## 2018-03-12 NOTE — BSMART NOTE
Robin Nice is participating in RN Coping Skills Group. Group time: 15 minutes    Personal goal for participation: Coping with stressors    Goal orientation: relaxation    Group therapy participation: active    Therapeutic interventions reviewed and discussed: Positive ways to cope with stress.     Impression of participation: Participated

## 2018-04-03 ENCOUNTER — OFFICE VISIT (OUTPATIENT)
Dept: ORTHOPEDIC SURGERY | Age: 38
End: 2018-04-03

## 2018-04-03 VITALS
SYSTOLIC BLOOD PRESSURE: 124 MMHG | RESPIRATION RATE: 16 BRPM | BODY MASS INDEX: 28.44 KG/M2 | HEIGHT: 72 IN | TEMPERATURE: 98.5 F | DIASTOLIC BLOOD PRESSURE: 102 MMHG | WEIGHT: 210 LBS | HEART RATE: 104 BPM | OXYGEN SATURATION: 99 %

## 2018-04-03 DIAGNOSIS — S83.241A ACUTE MEDIAL MENISCUS TEAR, RIGHT, INITIAL ENCOUNTER: Primary | ICD-10-CM

## 2018-04-03 NOTE — PROGRESS NOTES
Rexie Spurling  1980   Chief Complaint   Patient presents with    Knee Pain     ANGEL KNEE PAIN 9/10        HISTORY OF PRESENT ILLNESS  Rexie Spurling is a 30 y.o. male who presents today for evaluation of b/l knee pain R>L. Patient was referred by Dr. Miles Maurice. he rates his pain 9/10 today. Pain has been present for many years. H/o of right knee arthroscopy in 2005. Patient describes the pain as aching that is Intermittent in nature. Symptoms are worse with prolonged walking and standing, bending the right knee, certain movements of the knees, Activity and is better with  Rest. Associated symptoms include stiffness, instability, giving way. Since problem started, it: has worsened. Pain does not wake patient up at night. Has taken no recent meds for the problem. Has tried following treatments: Injections:NO; Brace:YES; Therapy:NO; Cane/Crutch:YES       No Known Allergies     Past Medical History:   Diagnosis Date    Depression     [facility] 3/[facility]2018      Social History     Social History    Marital status:      Spouse name: N/A    Number of children: N/A    Years of education: N/A     Occupational History    Not on file.      Social History Main Topics    Smoking status: Never Smoker    Smokeless tobacco: Never Used    Alcohol use No    Drug use: No    Sexual activity: Not on file     Other Topics Concern    Not on file     Social History Narrative      Past Surgical History:   Procedure Laterality Date    COLONOSCOPY N/A 12/13/2017    COLONOSCOPY (Con-Sed) performed [facility]alta [facility] ENDOSCOPY    HX CHOLECYSTECTOMY      HX ORTHOPAEDIC      scope right knee      Family History   Problem Relation Age of Onset    Other Mother      RHEM ARTHRITIS     Hypertension Mother     Diabetes Father       Current Outpatient Prescriptions   Medication Sig    fluticasone (FLONASE) 50 mcg/actuation nasal spray 2 Sprays by Both Nostrils route daily. Indications: Allergic Rhinitis    ipratropium (ATROVENT) 0.03 % nasal spray 2 Sprays by Both Nostrils route two (2) times a day. Indications: PERENNIAL ALLERGIC RHINITIS    loratadine (CLARITIN) 10 mg tablet Take 1 Tab by mouth daily. Indications: Allergic Rhinitis    traZODone (DESYREL) 50 mg tablet Take 1 Tab by mouth nightly as needed for Sleep. Indications: insomnia associated with depression    venlafaxine-SR (EFFEXOR-XR) 75 mg capsule Take 1 Cap by mouth daily (with breakfast). Indications: major depressive disorder     No current facility-administered medications for this visit. REVIEW OF SYSTEM   Patient denies: Weight loss, Fever/Chills, HA, Visual changes, Fatigue, Chest pain, SOB, Abdominal pain, N/V/D/C, Blood in stool or urine, Edema. Pertinent positive as above in HPI. All others were negative    PHYSICAL EXAM:   Visit Vitals    BP (!) 124/102    Pulse (!) 104    Temp 98.5 °F (36.9 °C) (Oral)    Resp 16    Ht 6' (1.829 m)    Wt 210 lb (95.3 kg)    SpO2 99%    BMI 28.48 kg/m2     The patient is a well-developed, well-nourished male   in no acute distress. The patient is alert and oriented times three. The patient is alert and oriented times three. Mood and affect are normal.  LYMPHATIC: lymph nodes are not enlarged and are within normal limits  SKIN: normal in color and non tender to palpation. There are no bruises or abrasions noted. NEUROLOGICAL: Motor sensory exam is within normal limits. Reflexes are equal bilaterally.  There is normal sensation to pinprick and light touch  MUSCULOSKELETAL:  Examination Left knee Right knee   Skin Intact Intact   Range of motion 0-130 0-130   Effusion - -   Medial joint line tenderness - -   Lateral joint line tenderness - -   Tenderness Pes Bursa - -   Tenderness insertion MCL - -   Tenderness insertion LCL - -   Nashs - -   Patella crepitus - -   Patella grind - -   Lachman - -   Pivot shift - - Anterior drawer - -   Posterior drawer - -   Varus stress - -   Valgus stress - -   Neurovascular Intact Intact   Calf Swelling and Tenderness to Palpation - -   Geoffrey's Test - -   Hamstring Cord Tightness - -         IMAGING: XR of the left knee dated 3/23/18 was reviewed and read:   IMPRESSION: No acute fractures or subluxation of left knee.     XR of the right knee dated 3/23/18 was reviewed and read:   IMPRESSION: No acute fractures or subluxation of right knee.         IMPRESSION:      ICD-10-CM ICD-9-CM    1. Acute medial meniscus tear, right, initial encounter S83.241A 836.0 MRI KNEE RT WO CONT        PLAN:  1. I cannot explain the patient's right knee instability from the XR. I am concerned that he may have damage to the meniscus. Risk factors include: n/a  2. No cortisone injection indicated today   3. No Physical/Occupational Therapy indicated today  4. Yes diagnostic test indicated today MRI R KNEE  5. No durable medical equipment indicated today  6. No referral indicated today   7. No medications indicated today  8. No Narcotic indicated today      RTC following MRI  Follow-up Disposition: Not on File    Scribed by Deshawn Champion [facility]) as dictated by Cammie Ag MD    I, Dr. Cammie Ag, confirm that all documentation is accurate.     Cammie Ag M.D.   [facility]

## 2018-04-04 ENCOUNTER — TELEPHONE (OUTPATIENT)
Dept: ORTHOPEDIC SURGERY | Age: 38
End: 2018-04-04

## 2018-04-04 DIAGNOSIS — M25.562 LEFT KNEE PAIN, UNSPECIFIED CHRONICITY: Primary | ICD-10-CM

## 2018-04-04 NOTE — TELEPHONE ENCOUNTER
Patient called in states that he would like his MRI to be of both knees as they both hurt. Patient states that he is still in pain and it is affecting his daily life he is asking to get the MRI sooner rather than later. Please advise patient at [phone].

## 2018-04-16 ENCOUNTER — HOSPITAL ENCOUNTER (OUTPATIENT)
Age: 38
Discharge: HOME | End: 2018-04-16
Attending: ORTHOPAEDIC SURGERY
Payer: MEDICAID

## 2018-04-16 DIAGNOSIS — S83.241A ACUTE MEDIAL MENISCUS TEAR, RIGHT, INITIAL ENCOUNTER: ICD-10-CM

## 2018-04-16 DIAGNOSIS — M25.562 LEFT KNEE PAIN, UNSPECIFIED CHRONICITY: ICD-10-CM

## 2018-04-16 PROCEDURE — 73721 MRI JNT OF LWR EXTRE W/O DYE: CPT

## 2018-04-24 ENCOUNTER — OFFICE VISIT (OUTPATIENT)
Dept: ORTHOPEDIC SURGERY | Age: 38
End: 2018-04-24

## 2018-04-24 VITALS
WEIGHT: 219.4 LBS | TEMPERATURE: 97.8 F | HEART RATE: 97 BPM | HEIGHT: 72 IN | SYSTOLIC BLOOD PRESSURE: 132 MMHG | OXYGEN SATURATION: 100 % | DIASTOLIC BLOOD PRESSURE: 84 MMHG | RESPIRATION RATE: 18 BRPM | BODY MASS INDEX: 29.72 KG/M2

## 2018-04-24 DIAGNOSIS — M25.561 CHRONIC PAIN OF BOTH KNEES: Primary | ICD-10-CM

## 2018-04-24 DIAGNOSIS — M25.562 CHRONIC PAIN OF BOTH KNEES: Primary | ICD-10-CM

## 2018-04-24 DIAGNOSIS — G89.29 CHRONIC PAIN OF BOTH KNEES: Primary | ICD-10-CM

## 2018-04-24 NOTE — PROGRESS NOTES
Nuris Mills  1980   Chief Complaint   Patient presents with    Knee Pain     Ankur        HISTORY OF PRESENT ILLNESS  Nuris Mills is a 30 y.o. male who presents today for reevaluation of b/k knee pain and to review MRI results. Patient rates pain as 9/10 today. He presents today using a cane. Patient describes a buckling sensation that is worse with walking. Pain has been present for many years. H/o of right knee arthroscopy in 2005. Patient denies any fever, chills, chest pain, shortness of breath or calf pain. There are no new illness or injuries to report since last seen in the office. There are no changes to medications, allergies, family or social history. PHYSICAL EXAM:   Visit Vitals    /84    Pulse 97    Temp 97.8 °F (36.6 °C) (Oral)    Resp 18    Ht 6' (1.829 m)    Wt 219 lb 6.4 oz (99.5 kg)    SpO2 100%    BMI 29.76 kg/m2     The patient is a well-developed, well-nourished male   in no acute distress. The patient is alert and oriented times three. The patient is alert and oriented times three. Mood and affect are normal.  LYMPHATIC: lymph nodes are not enlarged and are within normal limits  SKIN: normal in color and non tender to palpation. There are no bruises or abrasions noted. NEUROLOGICAL: Motor sensory exam is within normal limits. Reflexes are equal bilaterally.  There is normal sensation to pinprick and light touch  MUSCULOSKELETAL:  Examination Left knee Right knee   Skin Intact Intact   Range of motion 0-130 0-130   Effusion - -   Medial joint line tenderness - -   Lateral joint line tenderness - -   Tenderness Pes Bursa - -   Tenderness insertion MCL - -   Tenderness insertion LCL - -   Nashs - -   Patella crepitus - -   Patella grind - -   Lachman - -   Pivot shift - -   Anterior drawer - -   Posterior drawer - -   Varus stress - -   Valgus stress - -   Neurovascular Intact Intact   Calf Swelling and Tenderness to Palpation - -   Geoffrey's Test - - Hamstring Cord Tightness - -         IMAGING: MRI of the left knee dated 4/16/18 was reviewed and read: Impression:  1. No meniscal tears. Medial meniscus intrasubstance degeneration. 2. Mild quadriceps and patellar tendinosis. 3. 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 4. Mild tibiofibular joint effusion suggesting mild inflammation. MRI of the right knee dated 4/16/18 was reviewed and read: Impression:  1. Horizontal signal abnormality in the posterior body and posterior horn of  the medial meniscus that may extend to the upper articular surface suggesting horizontal tear. 2. Mild medial compartment chondral loss. 3. Mild quadriceps and patellar tendinosis. XR of the left knee dated 3/23/18 was reviewed and read:   IMPRESSION: No acute fractures or subluxation of left knee.      XR of the right knee dated 3/23/18 was reviewed and read:   IMPRESSION: No acute fractures or subluxation of right knee.       IMPRESSION:      ICD-10-CM ICD-9-CM    1. Chronic pain of both knees M25.561 719.46 REFERRAL TO RHEUMATOLOGY    M25.562 338.29     G89.29          PLAN:   1. I discussed the results of the MRI and the treatment options with the patient. I would not recommend any surgery at this time after reviewing the bilateral MRI. I cannot explain the persistent pain from the MRI. Provided with handicap sticker for 2 months  Risk factors include: n/a  2. No cortisone injection indicated today   3. No Physical/Occupational Therapy indicated today  4. No diagnostic test indicated today  5. No durable medical equipment indicated today  6. Yes referral indicated today RHEUMATOLOGY  7. No medications indicated today  8. No Narcotic indicated today    RTC prn  Follow-up Disposition: Not on File    Scribed by Antonia Galvez [facility]) as dictated by Ralph Murrieta MD    I, Dr. Ralph Murrieta, confirm that all documentation is accurate.     Ralph Murrieta M.D.   [facility]

## 2018-05-15 ENCOUNTER — HOSPITAL ENCOUNTER (INPATIENT)
Age: 38
LOS: 6 days | Discharge: HOME | DRG: 885 | End: 2018-05-22
Attending: EMERGENCY MEDICINE | Admitting: PSYCHIATRY & NEUROLOGY
Payer: SELF-PAY

## 2018-05-15 DIAGNOSIS — R45.851 SUICIDAL IDEATION: Primary | ICD-10-CM

## 2018-05-15 DIAGNOSIS — T50.902A INTENTIONAL DRUG OVERDOSE, INITIAL ENCOUNTER (HCC): ICD-10-CM

## 2018-05-15 LAB
ALBUMIN SERPL-MCNC: 3.8 G/DL (ref 3.4–5)
ALBUMIN/GLOB SERPL: 1.1 {RATIO} (ref 0.8–1.7)
ALP SERPL-CCNC: 85 U/L (ref 45–117)
ALT SERPL-CCNC: 46 U/L (ref 16–61)
AMPHET UR QL SCN: NEGATIVE
ANION GAP SERPL CALC-SCNC: 7 MMOL/L (ref 3–18)
APAP SERPL-MCNC: <2 UG/ML (ref 10–30)
AST SERPL-CCNC: 14 U/L (ref 15–37)
BARBITURATES UR QL SCN: NEGATIVE
BASOPHILS # BLD: 0 K/UL (ref 0–0.1)
BASOPHILS NFR BLD: 0 % (ref 0–2)
BENZODIAZ UR QL: NEGATIVE
BILIRUB SERPL-MCNC: 0.3 MG/DL (ref 0.2–1)
BUN SERPL-MCNC: 17 MG/DL (ref 7–18)
BUN/CREAT SERPL: 19 (ref 12–20)
CALCIUM SERPL-MCNC: 8.1 MG/DL (ref 8.5–10.1)
CANNABINOIDS UR QL SCN: NEGATIVE
CHLORIDE SERPL-SCNC: 107 MMOL/L (ref 100–108)
CK MB CFR SERPL CALC: NORMAL % (ref 0–4)
CK MB SERPL-MCNC: <1 NG/ML (ref 5–25)
CK SERPL-CCNC: 128 U/L (ref 39–308)
CO2 SERPL-SCNC: 27 MMOL/L (ref 21–32)
COCAINE UR QL SCN: NEGATIVE
CREAT SERPL-MCNC: 0.9 MG/DL (ref 0.6–1.3)
DIFFERENTIAL METHOD BLD: ABNORMAL
EOSINOPHIL # BLD: 0.1 K/UL (ref 0–0.4)
EOSINOPHIL NFR BLD: 1 % (ref 0–5)
ERYTHROCYTE [DISTWIDTH] IN BLOOD BY AUTOMATED COUNT: 12.6 % (ref 11.6–14.5)
ETHANOL SERPL-MCNC: <3 MG/DL (ref 0–3)
GLOBULIN SER CALC-MCNC: 3.4 G/DL (ref 2–4)
GLUCOSE SERPL-MCNC: 82 MG/DL (ref 74–99)
HCT VFR BLD AUTO: 43.5 % (ref 36–48)
HDSCOM,HDSCOM: NORMAL
HGB BLD-MCNC: 14.6 G/DL (ref 13–16)
LYMPHOCYTES # BLD: 2.3 K/UL (ref 0.9–3.6)
LYMPHOCYTES NFR BLD: 27 % (ref 21–52)
MCH RBC QN AUTO: 27.5 PG (ref 24–34)
MCHC RBC AUTO-ENTMCNC: 33.6 G/DL (ref 31–37)
MCV RBC AUTO: 81.9 FL (ref 74–97)
METHADONE UR QL: NEGATIVE
MONOCYTES # BLD: 0.5 K/UL (ref 0.05–1.2)
MONOCYTES NFR BLD: 6 % (ref 3–10)
NEUTS SEG # BLD: 5.7 K/UL (ref 1.8–8)
NEUTS SEG NFR BLD: 66 % (ref 40–73)
OPIATES UR QL: NEGATIVE
PCP UR QL: NEGATIVE
PLATELET # BLD AUTO: 245 K/UL (ref 135–420)
PMV BLD AUTO: 9.1 FL (ref 9.2–11.8)
POTASSIUM SERPL-SCNC: 3.8 MMOL/L (ref 3.5–5.5)
PROT SERPL-MCNC: 7.2 G/DL (ref 6.4–8.2)
RBC # BLD AUTO: 5.31 M/UL (ref 4.7–5.5)
SALICYLATES SERPL-MCNC: <2.8 MG/DL (ref 2.8–20)
SODIUM SERPL-SCNC: 141 MMOL/L (ref 136–145)
TROPONIN I SERPL-MCNC: <0.02 NG/ML (ref 0–0.04)
WBC # BLD AUTO: 8.6 K/UL (ref 4.6–13.2)

## 2018-05-15 PROCEDURE — 80053 COMPREHEN METABOLIC PANEL: CPT | Performed by: EMERGENCY MEDICINE

## 2018-05-15 PROCEDURE — 80307 DRUG TEST PRSMV CHEM ANLYZR: CPT | Performed by: EMERGENCY MEDICINE

## 2018-05-15 PROCEDURE — 99285 EMERGENCY DEPT VISIT HI MDM: CPT

## 2018-05-15 PROCEDURE — 74011000250 HC RX REV CODE- 250: Performed by: EMERGENCY MEDICINE

## 2018-05-15 PROCEDURE — 85025 COMPLETE CBC W/AUTO DIFF WBC: CPT | Performed by: EMERGENCY MEDICINE

## 2018-05-15 PROCEDURE — 74011250636 HC RX REV CODE- 250/636: Performed by: EMERGENCY MEDICINE

## 2018-05-15 PROCEDURE — 82550 ASSAY OF CK (CPK): CPT | Performed by: EMERGENCY MEDICINE

## 2018-05-15 PROCEDURE — 93005 ELECTROCARDIOGRAM TRACING: CPT

## 2018-05-15 RX ADMIN — POISON ADSORBENT 50 G: 50 SUSPENSION ORAL at 20:03

## 2018-05-15 RX ADMIN — SODIUM CHLORIDE 1000 ML: 900 INJECTION, SOLUTION INTRAVENOUS at 19:46

## 2018-05-15 NOTE — ED PROVIDER NOTES
[facility] EMERGENCY DEPT      4:41 PM    Date: 5/15/2018  Patient Name: Nicolle Worthington    History of Presenting Illness     No chief complaint on file. History Provided By: Patient    Chief Complaint: intentional overdose   Duration: 1.5 hours ago  Timing:  Acute  Location:   Quality: Trazodone   Severity: he reports 550 mg   Modifying Factors:   Associated Symptoms: SI, dysphoric mood    30 y.o. male with noted past medical history who presents to the emergency department after an intentional overdose about 1.5 hours ago. He reports taking 11 x 50 mg Trazodone. He denies taking any other drugs or alcohol use. He reports SI and dysphoric mood, but denies HI. Pt reports previous similar incident with stress at home and is willing to be admitted if necessary. No other symptoms or concerns were expressed. No other complaints. Nursing notes regarding the HPI and triage nursing notes were reviewed. Prior medical records were reviewed. Current Outpatient Prescriptions   Medication Sig Dispense Refill    fluticasone (FLONASE) 50 mcg/actuation nasal spray 2 Sprays by Both Nostrils route daily. Indications: Allergic Rhinitis 1 Bottle 0    ipratropium (ATROVENT) 0.03 % nasal spray 2 Sprays by Both Nostrils route two (2) times a day. Indications: PERENNIAL ALLERGIC RHINITIS 30 mL 0    loratadine (CLARITIN) 10 mg tablet Take 1 Tab by mouth daily. Indications: Allergic Rhinitis 30 Tab 0    traZODone (DESYREL) 50 mg tablet Take 1 Tab by mouth nightly as needed for Sleep. Indications: insomnia associated with depression 15 Tab 0    venlafaxine-SR (EFFEXOR-XR) 75 mg capsule Take 1 Cap by mouth daily (with breakfast).  Indications: major depressive disorder 30 Cap 0       Past History     Past Medical History:  Past Medical History:   Diagnosis Date    Depression     MDD (major depressive disorder), recurrent severe, without psychosis ([facility] [address] 3/4/2018       Past Surgical History:  Past Surgical History:   Procedure Laterality Date    COLONOSCOPY N/A 12/13/2017    COLONOSCOPY (Con-Sed) performed by Lashae Jarquin [facility] at [facility] ENDOSCOPY    HX CHOLECYSTECTOMY      HX ORTHOPAEDIC      scope right knee       Family History:  Family History   Problem Relation Age of Onset    Other Mother      RHEM ARTHRITIS     Hypertension Mother     Diabetes Father        Social History:  Social History   Substance Use Topics    Smoking status: Never Smoker    Smokeless tobacco: Never Used    Alcohol use No       Allergies:  No Known Allergies    Patient's primary care provider (as noted in EPIC):  Heriberto Piper MD    Review of Systems   Constitutional: Negative for diaphoresis. HENT: Negative for congestion. Eyes: Negative for discharge. Respiratory: Negative for stridor. Cardiovascular: Negative for palpitations. Gastrointestinal: Negative for diarrhea. Genitourinary: Negative for flank pain. Musculoskeletal: Negative for back pain. Neurological: Negative for weakness. Psychiatric/Behavioral: Positive for dysphoric mood, self-injury and suicidal ideas. Negative for hallucinations. (-)   All other systems reviewed and are negative. Visit Vitals    /65 (BP 1 Location: Left arm)    Pulse 79    Temp 97.4 °F (36.3 °C)    Resp 14    Ht 6' (1.829 m)    Wt 98.9 kg (218 lb)    SpO2 96%    BMI 29.57 kg/m2       PHYSICAL EXAM:    CONSTITUTIONAL:  Alert, in no apparent distress;  well developed;  well nourished. HEAD:  Normocephalic, atraumatic. EYES:  EOMI. Non-icteric sclera. Normal conjunctiva. ENTM:  Nose:  no rhinorrhea. Throat:  no erythema or exudate, mucous membranes moist.  NECK:  No JVD. Supple  RESPIRATORY:  Chest clear, equal breath sounds, good air movement. CARDIOVASCULAR:  Regular rate and rhythm. No murmurs, rubs, or gallops. GI:  Normal bowel sounds, abdomen soft and non-tender. No rebound or guarding. BACK:  Non-tender.   UPPER EXT:  Normal inspection. LOWER EXT:  No edema, no calf tenderness. Distal pulses intact. NEURO:  Moves all four extremities, and grossly normal motor exam.  SKIN:  No rashes;  Normal for age. PSYCH:  Alert and normal affect. DIFFERENTIAL DIAGNOSES/ MEDICAL DECISION MAKING:   Differential diagnoses/impression: Need to rule out obvious organic causes versus psychological etiology. Based on patient's presentation and lab work, I do not believe that there is an obvious organic etiology for the patient's suicidal ideation. I believe the patient needs psychiatric evaluation and treatment for the suicidal ideation.       ED COURSE:      Diagnostic Study Results     Abnormal lab results from this emergency department encounter:  Labs Reviewed   CBC WITH AUTOMATED DIFF - Abnormal; Notable for the following:        Result Value    MPV 9.1 (*)     All other components within normal limits   METABOLIC PANEL, COMPREHENSIVE - Abnormal; Notable for the following:     Calcium 8.1 (*)     AST (SGOT) 14 (*)     All other components within normal limits   SALICYLATE - Abnormal; Notable for the following:     Salicylate level <4.9 (*)     All other components within normal limits   ACETAMINOPHEN - Abnormal; Notable for the following:     Acetaminophen level <2 (*)     All other components within normal limits   CARDIAC PANEL,(CK, CKMB & TROPONIN)   DRUG SCREEN, URINE   ETHYL ALCOHOL       Lab values for this patient within approximately the last 12 hours:  Recent Results (from the past 12 hour(s))   CBC WITH AUTOMATED DIFF    Collection Time: 05/15/18  7:43 PM   Result Value Ref Range    WBC 8.6 4.6 - 13.2 K/uL    RBC 5.31 4.70 - 5.50 M/uL    HGB 14.6 13.0 - 16.0 g/dL    HCT 43.5 36.0 - 48.0 %    MCV 81.9 74.0 - 97.0 FL    MCH 27.5 24.0 - 34.0 PG    MCHC 33.6 31.0 - 37.0 g/dL    RDW 12.6 11.6 - 14.5 %    PLATELET 475 165 - 150 K/uL    MPV 9.1 (L) 9.2 - 11.8 FL    NEUTROPHILS 66 40 - 73 %    LYMPHOCYTES 27 21 - 52 %    MONOCYTES 6 3 - 10 % EOSINOPHILS 1 0 - 5 %    BASOPHILS 0 0 - 2 %    ABS. NEUTROPHILS 5.7 1.8 - 8.0 K/UL    ABS. LYMPHOCYTES 2.3 0.9 - 3.6 K/UL    ABS. MONOCYTES 0.5 0.05 - 1.2 K/UL    ABS. EOSINOPHILS 0.1 0.0 - 0.4 K/UL    ABS. BASOPHILS 0.0 0.0 - 0.1 K/UL    DF AUTOMATED     METABOLIC PANEL, COMPREHENSIVE    Collection Time: 05/15/18  7:43 PM   Result Value Ref Range    Sodium 141 136 - 145 mmol/L    Potassium 3.8 3.5 - 5.5 mmol/L    Chloride 107 100 - 108 mmol/L    CO2 27 21 - 32 mmol/L    Anion gap 7 3.0 - 18 mmol/L    Glucose 82 74 - 99 mg/dL    BUN 17 7.0 - 18 MG/DL    Creatinine 0.90 0.6 - 1.3 MG/DL    BUN/Creatinine ratio 19 12 - 20      GFR est AA >60 >60 ml/min/1.73m2    GFR est non-AA >60 >60 ml/min/1.73m2    Calcium 8.1 (L) 8.5 - 10.1 MG/DL    Bilirubin, total 0.3 0.2 - 1.0 MG/DL    ALT (SGPT) 46 16 - 61 U/L    AST (SGOT) 14 (L) 15 - 37 U/L    Alk.  phosphatase 85 45 - 117 U/L    Protein, total 7.2 6.4 - 8.2 g/dL    Albumin 3.8 3.4 - 5.0 g/dL    Globulin 3.4 2.0 - 4.0 g/dL    A-G Ratio 1.1 0.8 - 1.7     CARDIAC PANEL,(CK, CKMB & TROPONIN)    Collection Time: 05/15/18  7:43 PM   Result Value Ref Range     39 - 308 U/L    CK - MB <1.0 <3.6 ng/ml    CK-MB Index  0.0 - 4.0 %     CALCULATION NOT PERFORMED WHEN RESULT IS BELOW LINEAR LIMIT    Troponin-I, Qt. <0.02 0.0 - 0.764 NG/ML   SALICYLATE    Collection Time: 05/15/18  7:43 PM   Result Value Ref Range    Salicylate level <6.1 (L) 2.8 - 20.0 MG/DL   ETHYL ALCOHOL    Collection Time: 05/15/18  7:43 PM   Result Value Ref Range    ALCOHOL(ETHYL),SERUM <3 0 - 3 MG/DL   ACETAMINOPHEN    Collection Time: 05/15/18  7:43 PM   Result Value Ref Range    Acetaminophen level <2 (L) 10.0 - 30.0 ug/mL   DRUG SCREEN, URINE    Collection Time: 05/15/18  8:00 PM   Result Value Ref Range    BENZODIAZEPINES NEGATIVE  NEG      BARBITURATES NEGATIVE  NEG      THC (TH-CANNABINOL) NEGATIVE  NEG      OPIATES NEGATIVE  NEG      PCP(PHENCYCLIDINE) NEGATIVE  NEG      COCAINE NEGATIVE  NEG AMPHETAMINES NEGATIVE  NEG      METHADONE NEGATIVE  NEG      HDSCOM (NOTE)        Radiologist and cardiologist interpretations if available at time of this note:  No results found. Medication(s) ordered for patient during this emergency visit encounter:  Medications   sodium chloride 0.9 % bolus infusion 1,000 mL (1,000 mL IntraVENous New Bag 5/15/18 1946)   activated charcoal-sorbitol (ACTIDOSE) oral suspension 50 g (50 g Oral Given 5/15/18 2003)       Medical Decision Making     I am the first provider for this patient. I reviewed the vital signs, available nursing notes, past medical history, past surgical history, family history and social history. Vital Signs:  Reviewed the patient's vital signs. ED COURSE:  The patient has no active medical issues. I believe that the patient is medically cleared for admission to a psychiatric unit if this is deemed appropriate by the crisis staff after their evaluation of the patient. Consult Poison Control at [phone]    [facility] was called and the patient was presented for treatment recommendations for overdose of:  Trazadone 550 mg  About 1 hour PTA. Treatment recommendations from the poison control center specific to this patient presentation today are:  Activated charcoal, serum labs, EKG, UDS.     9:04 PM   Poison control called with lab results and VS. Poison control recommendations:  1. Observation for 4-6 hours, which patient has now completed 5 hours and still normal MS. Will now refer to [facility] crisis for evaluation. IMPRESSION AND MEDICAL DECISION MAKING:  Based upon the patient's presentation with noted HPI and PE, along with the work up done in the emergency department, I believe that the patient is having suicidal ideation that MAY require admission and further evaluation on a psychiatric/ behavioral medicine unit.      THE PATIENT IS MEDICALLY CLEARED FOR ADMISSION TO A PSYCHIATRIC UNIT.    10:50 PM  I spoke to the [facility] crisis nurseErick who will pass patient on for [facility] to evaluate. Final disposition of the patient will be determined based on the recommendation of the crisis nurse. Condition:  Stable      Coding Diagnoses     Clinical Impression:   1. Suicidal ideation    2. Intentional drug overdose, initial encounter [facility])      Signout Note      Pt care transferred to Dr. Adolfo Goldstein  ,ED provider. History of patient complaint(s), available diagnostic reports and current treatment plan has been discussed thoroughly. Bedside rounding on patient occured : no . Intended disposition of patient : TBD  Pending diagnostics reports and/or labs (please list):  [facility] crisis evaluation of patient and disposition recommendation. DIANA Dumont Board Certified Emergency Physician    Provider Attestation:  If a scribe was utilized in generation of this patient record, I personally performed the services described in the documentation, reviewed the documentation, as recorded by the scribe in my presence, and it accurately records the patient's history of presenting illness, review of systems, patient physical examination, and procedures performed by me as the attending physician. DIANA Dumont Board Certified Emergency Physician  5/15/2018.  4:42 PM    Scribe Navin Stafford acting as a scribe for and in the presence of Jeoffrey Krabbe, MD      May 15, 2018 at 4:46 PM       Provider Attestation:      I personally performed the services described in the documentation, reviewed the documentation, as recorded by the scribe in my presence, and it accurately and completely records my words and actions.  May 15, 2018 at 4:46 PM - Jeoffrey Krabbe, MD

## 2018-05-15 NOTE — IP AVS SNAPSHOT
Maricarmen Mitchell 
 
 
 [address] [facility] [address] Patient: Shayla Dial MRN: [id number] About your hospitalization You were admitted on:  May 16, 2018 You last received care in the:  [facility] 1 ADULT CHEM DEP You were discharged on:  May 22, 2018 Why you were hospitalized Your primary diagnosis was:  Mdd (Major Depressive Disorder), Recurrent Severe, Without Psychosis (Hcc) Follow-up Information Follow up With Details Comments Contact Info Fernando Reyes MD   [address] [facility] [address] [phone] Behavioral [facility] Services Go on 5/25/2018 With Dr. Papa Murray at 12:00 for counseling [address] 
[phone] After appointment, patient must reschedule his own appointment with Dr. Genevieve Marshall for Medication Management. Discharge Orders None A check paula indicates which time of day the medication should be taken. My Medications CHANGE how you take these medications Instructions Each Dose to Equal  
 Morning Noon Evening Bedtime  
 traZODone 100 mg tablet Commonly known as:  Parry Spatz What changed:   
- medication strength 
- how much to take Your last dose was: Your next dose is: Take 1 Tab by mouth nightly as needed for Sleep. Indications: insomnia associated with depression 100 mg  
    
   
   
   
  
 venlafaxine- mg capsule Commonly known as:  EFFEXOR-XR What changed:   
- medication strength 
- how much to take Your last dose was: Your next dose is: Take 1 Cap by mouth daily (with breakfast). Indications: major depressive disorder 150 mg CONTINUE taking these medications Instructions Each Dose to Equal  
 Morning Noon Evening Bedtime  
 fluticasone 50 mcg/actuation nasal spray Commonly known as:  Doreen Herr Your last dose was: Your next dose is: 2 Sprays by Both Nostrils route daily. Indications: Allergic Rhinitis 2 Spray  
    
   
   
   
  
 ipratropium 0.03 % nasal spray Commonly known as:  ATROVENT Your last dose was: Your next dose is: 2 Sprays by Both Nostrils route two (2) times a day. Indications: PERENNIAL ALLERGIC RHINITIS 2 Spray  
    
   
   
   
  
 loratadine 10 mg tablet Commonly known as:  Ashley Manrique Your last dose was: Your next dose is: Take 1 Tab by mouth daily. Indications: Allergic Rhinitis 10 mg Where to Get Your Medications Information on where to get these meds will be given to you by the nurse or doctor. ! Ask your nurse or doctor about these medications  
  traZODone 100 mg tablet  
 venlafaxine- mg capsule Discharge Instructions BEHAVIORAL HEALTH NURSING DISCHARGE NOTE The following personal items collected during your admission are returned to you:  
Dental Appliance:   
Vision: Visual Aid: None Hearing Aid:   
Jewelry:   
Clothing: Clothing: Footwear, Pants, Shirt, Socks, Undergarments Other Valuables: Other Valuables: [employer], Eyeglasses, Keys, Wallet (Black duffle bag in storage room.) Valuables sent to safe: Personal Items Sent to Safe: 3 visa cards and va drivers PATIENT INSTRUCTIONS: 
 
Diet: Regular The discharge information has been reviewed with the patient. The patient verbalized understanding. Patient armband removed and shredded. MyChart Announcement We are excited to announce that we are making your provider's discharge notes available to you in PlexPresshart. You will see these notes when they are completed and signed by the physician that discharged you from your recent hospital stay.   If you have any questions or concerns about any information you see in Bitnami, please call the Health Information Department where you were seen or reach out to your Primary Care Provider for more information about your plan of care. Introducing [facility]! Selina Jasmine introduces Bitnami patient portal. Now you can access parts of your medical record, email your doctor's office, and request medication refills online. 1. In your internet browser, go to [email or web address] 2. Click on the First Time User? Click Here link in the Sign In box. You will see the New Member Sign Up page. 3. Enter your Bitnami Access Code exactly as it appears below. You will not need to use this code after youve completed the sign-up process. If you do not sign up before the expiration date, you must request a new code. · Bitnami Access Code: [email or web address] Expires: 6/3/2018  5:55 PM 
 
4. Enter the last four digits of your Social Security Number (xxxx) and Date of Birth (mm/dd/yyyy) as indicated and click Submit. You will be taken to the next sign-up page. 5. Create a Bitnami ID. This will be your Bitnami login ID and cannot be changed, so think of one that is secure and easy to remember. 6. Create a Bitnami password. You can change your password at any time. 7. Enter your Password Reset Question and Answer. This can be used at a later time if you forget your password. 8. Enter your e-mail address. You will receive e-mail notification when new information is available in [address] 9. Click Sign Up. You can now view and download portions of your medical record. 10. Click the Download Summary menu link to download a portable copy of your medical information. If you have questions, please visit the Frequently Asked Questions section of the Bitnami website. Remember, Bitnami is NOT to be used for urgent needs. For medical emergencies, dial 911. Now available from your iPhone and Android! Introducing José Manuel Flores As a [facility]  patient, I wanted to make you aware of our electronic visit tool called José Manuel [employer]. [employer] allows you to connect within minutes with a medical provider 24 hours a day, seven days a week via a mobile device or tablet or logging into a secure website from your computer. You can access José Manuel Epiclist from anywhere in the [place]. A virtual visit might be right for you when you have a simple condition and feel like you just dont want to get out of bed, or cant get away from work for an appointment, when your regular [facility]  provider is not available (evenings, weekends or holidays), or when youre out of town and need minor care. Electronic visits cost only $49 and if the [facility]  24/7 provider determines a prescription is needed to treat your condition, one can be electronically transmitted to a nearby pharmacy*. Please take a moment to enroll today if you have not already done so. The enrollment process is free and takes just a few minutes. To enroll, please download the [employer] jeffrey to your tablet or phone, or visit [email or web address] to enroll on your computer. And, as an [address] [place] [address] patient with a Workables account, the results of your visits will be scanned into your electronic medical record and your primary care provider will be able to view the scanned results. We urge you to continue to see your regular [facility]  provider for your ongoing medical care. And while your primary care provider may not be the one available when you seek a José Manuel Flores virtual visit, the peace of mind you get from getting a real diagnosis real time can be priceless. For more information on José Manuel Freemansharronfin, view our Frequently Asked Questions (FAQs) at [email or web address]. Sincerely, 
 
Ana Boyd MD 
Chief Medical Officer Mikey Rg *:  certain medications cannot be prescribed via José Manuel Flores Providers Seen During Your Hospitalization Provider Specialty Primary office phone Thom Ortega MD Emergency Medicine [phone] Nicolas Dubon MD Psychiatry [phone] Your Primary Care Physician (PCP) Primary Care Physician Office Phone Office Fax [place] Tobias [phone] You are allergic to the following No active allergies Recent Documentation Height Weight BMI Smoking Status 1.829 m 98.9 kg 29.57 kg/m2 Never Smoker Emergency Contacts Name Discharge Info Relation Home Work Mobile [facility] CAREGIVER [3] Spouse [3] [phone] Patient Belongings The following personal items are in your possession at time of discharge: 
     Visual Aid: None      Home Medications: Locked      Clothing: Footwear, Pants, Shirt, Socks, Undergarments    Other Valuables: Cane, [place] [employer]ree[employer], Keys, Wallet (Black duffle bag in storage room.)  Personal Items Sent to Safe: 3 visa cards and va drivers Please provide this summary of care documentation to your next provider. Signatures-by signing, you are acknowledging that this After Visit Summary has been reviewed with you and you have received a copy. Patient Signature:  ____________________________________________________________ Date:  ____________________________________________________________  
  
Larri Hazard Provider Signature:  ____________________________________________________________ Date:  ____________________________________________________________

## 2018-05-15 NOTE — IP AVS SNAPSHOT
[address] [place] [address] Patient: Kendrick Ellison MRN: [id number] A check paula indicates which time of day the medication should be taken. My Medications CHANGE how you take these medications Instructions Each Dose to Equal  
 Morning Noon Evening Bedtime  
 traZODone 100 mg tablet Commonly known as:  Mayra Wright What changed:   
- medication strength 
- how much to take Your last dose was: Your next dose is: Take 1 Tab by mouth nightly as needed for Sleep. Indications: insomnia associated with depression 100 mg  
    
   
   
   
  
 venlafaxine- mg capsule Commonly known as:  EFFEXOR-XR What changed:   
- medication strength 
- how much to take Your last dose was: Your next dose is: Take 1 Cap by mouth daily (with breakfast). Indications: major depressive disorder 150 mg CONTINUE taking these medications Instructions Each Dose to Equal  
 Morning Noon Evening Bedtime  
 fluticasone 50 mcg/actuation nasal spray Commonly known as:  Summers Public Your last dose was: Your next dose is: 2 Sprays by Both Nostrils route daily. Indications: Allergic Rhinitis 2 Spray  
    
   
   
   
  
 ipratropium 0.03 % nasal spray Commonly known as:  ATROVENT Your last dose was: Your next dose is: 2 Sprays by Both Nostrils route two (2) times a day. Indications: PERENNIAL ALLERGIC RHINITIS 2 Spray  
    
   
   
   
  
 loratadine 10 mg tablet Commonly known as:  Osiel Liriano Your last dose was: Your next dose is: Take 1 Tab by mouth daily. Indications: Allergic Rhinitis 10 mg Where to Get Your Medications Information on where to get these meds will be given to you by the nurse or doctor. ! Ask your nurse or doctor about these medications  
  traZODone 100 mg tablet  
 venlafaxine- mg capsule

## 2018-05-16 PROBLEM — T50.902A INTENTIONAL DRUG OVERDOSE (HCC): Status: ACTIVE | Noted: 2018-05-16

## 2018-05-16 PROCEDURE — 74011250637 HC RX REV CODE- 250/637: Performed by: STUDENT IN AN ORGANIZED HEALTH CARE EDUCATION/TRAINING PROGRAM

## 2018-05-16 PROCEDURE — 65220000005 HC RM SEMIPRIVATE PSYCH 3 OR 4 BED

## 2018-05-16 RX ORDER — VENLAFAXINE HYDROCHLORIDE 150 MG/1
150 CAPSULE, EXTENDED RELEASE ORAL
Status: DISCONTINUED | OUTPATIENT
Start: 2018-05-16 | End: 2018-05-22 | Stop reason: HOSPADM

## 2018-05-16 RX ORDER — HYDROXYZINE PAMOATE 50 MG/1
50 CAPSULE ORAL
Status: DISCONTINUED | OUTPATIENT
Start: 2018-05-16 | End: 2018-05-22 | Stop reason: HOSPADM

## 2018-05-16 RX ORDER — TRAZODONE HYDROCHLORIDE 50 MG/1
50 TABLET ORAL
Status: DISCONTINUED | OUTPATIENT
Start: 2018-05-16 | End: 2018-05-20

## 2018-05-16 RX ORDER — HALOPERIDOL 5 MG/ML
5 INJECTION INTRAMUSCULAR
Status: DISCONTINUED | OUTPATIENT
Start: 2018-05-16 | End: 2018-05-22 | Stop reason: HOSPADM

## 2018-05-16 RX ORDER — IBUPROFEN 400 MG/1
400 TABLET ORAL
Status: DISCONTINUED | OUTPATIENT
Start: 2018-05-16 | End: 2018-05-22 | Stop reason: HOSPADM

## 2018-05-16 RX ORDER — TRAZODONE HYDROCHLORIDE 50 MG/1
50 TABLET ORAL
Status: DISCONTINUED | OUTPATIENT
Start: 2018-05-16 | End: 2018-05-16 | Stop reason: SDUPTHER

## 2018-05-16 RX ORDER — HALOPERIDOL 5 MG/1
5 TABLET ORAL
Status: DISCONTINUED | OUTPATIENT
Start: 2018-05-16 | End: 2018-05-22 | Stop reason: HOSPADM

## 2018-05-16 RX ORDER — LORAZEPAM 1 MG/1
1-2 TABLET ORAL
Status: DISCONTINUED | OUTPATIENT
Start: 2018-05-16 | End: 2018-05-22 | Stop reason: HOSPADM

## 2018-05-16 RX ORDER — LORAZEPAM 2 MG/ML
1-2 INJECTION INTRAMUSCULAR
Status: DISCONTINUED | OUTPATIENT
Start: 2018-05-16 | End: 2018-05-22 | Stop reason: HOSPADM

## 2018-05-16 RX ORDER — BENZTROPINE MESYLATE 1 MG/ML
1 INJECTION INTRAMUSCULAR; INTRAVENOUS
Status: DISCONTINUED | OUTPATIENT
Start: 2018-05-16 | End: 2018-05-22 | Stop reason: HOSPADM

## 2018-05-16 RX ORDER — BENZTROPINE MESYLATE 1 MG/1
1 TABLET ORAL
Status: DISCONTINUED | OUTPATIENT
Start: 2018-05-16 | End: 2018-05-22 | Stop reason: HOSPADM

## 2018-05-16 RX ADMIN — VENLAFAXINE HYDROCHLORIDE 150 MG: 150 CAPSULE, EXTENDED RELEASE ORAL at 16:01

## 2018-05-16 RX ADMIN — TRAZODONE HYDROCHLORIDE 50 MG: 50 TABLET ORAL at 21:12

## 2018-05-16 NOTE — H&P
Psychiatry History and Physical    Subjective:     Date of Evaluation:  5/16/2018    Reason for Referral:  Sabrina Healy was referred to the examiners from [facility] for OD. History of Presenting Problem: 36Y/O AA male in NAD well developed and nourished. Reports prior admit to [facility] last month. Medical problems; Depression, Insomnia, chronic right knee pain-ACL problem    Patient Active Problem List    Diagnosis Date Noted    Intentional drug overdose ([facility] 05/16/2018    Bupropion overdose 03/04/2018    Suicidal ideation 03/04/2018    MDD (major depressive disorder), recurrent severe, without psychosis ([facility] 03/04/2018     Past Medical History:   Diagnosis Date    Depression     MDD (major depressive disorder), recurrent severe, without psychosis ([facility] 3/4/2018     Past Surgical History:   Procedure Laterality Date    COLONOSCOPY N/A 12/13/2017    COLONOSCOPY (Con-Sed) performed by Nilo Werner MD at [facility] ENDOSCOPY    HX CHOLECYSTECTOMY      HX ORTHOPAEDIC      scope right knee       Family History   Problem Relation Age of Onset    Other Mother      RHEM ARTHRITIS     Hypertension Mother     Diabetes Father       Social History   Substance Use Topics    Smoking status: Never Smoker    Smokeless tobacco: Never Used    Alcohol use No     Prior to Admission medications    Medication Sig Start Date End Date Taking? Authorizing Provider   fluticasone (FLONASE) 50 mcg/actuation nasal spray 2 Sprays by Both Nostrils route daily. Indications: Allergic Rhinitis 3/12/18  Yes Teresa Gamez MD   traZODone (DESYREL) 50 mg tablet Take 1 Tab by mouth nightly as needed for Sleep. Indications: insomnia associated with depression 3/12/18  Yes Teresa Gamez MD   venlafaxine-SR [facility] 75 mg capsule Take 1 Cap by mouth daily (with breakfast).  Indications: major depressive disorder 3/13/18  Yes Teresa Gamez MD   ipratropium (ATROVENT) 0.03 % nasal spray 2 Sprays by Both Nostrils route two (2) times a day. Indications: PERENNIAL ALLERGIC RHINITIS 3/12/18   Aidan Conley MD   loratadine (CLARITIN) 10 mg tablet Take 1 Tab by mouth daily. Indications:  Allergic Rhinitis 3/12/18   Aidan Conley MD     No Known Allergies     Review of Systems - History obtained from chart review and the patient  General ROS: positive for  - sleep disturbance  Psychological ROS: positive for - depression and sleep disturbances  Respiratory ROS: no cough, shortness of breath, or wheezing  Cardiovascular ROS: no chest pain or dyspnea on exertion  Gastrointestinal ROS: no abdominal pain, change in bowel habits, or black or bloody stools  Genito-Urinary ROS: no dysuria, trouble voiding, or hematuria  Musculoskeletal ROS: positive for - gait disturbance and muscle pain  Neurological ROS: no TIA or stroke symptoms  Dermatological ROS: negative      Objective:     Patient Vitals for the past 8 hrs:   BP Temp Pulse Resp SpO2   05/16/18 0552 138/80 97.4 °F (36.3 °C) 94 16 -   05/16/18 0402 105/74 97.4 °F (36.3 °C) 72 16 98 %       Mental Status exam: WNL except for    Sensorium  oriented to time, place and person   Orientation person, place, time/date and situation   Relations cooperative   Eye Contact appropriate   Appearance:  age appropriate and within normal Limits   Motor Behavior:  gait unsteady   Speech:  normal volume   Vocabulary average   Thought Process: logical   Thought Content free of delusions and free of hallucinations   Suicidal ideations no plan  and no intention   Homicidal ideations no plan  and no intention   Mood:  depressed   Affect:  depressed   Memory recent  adequate   Memory remote:  adequate   Concentration:  adequate   Abstraction:  concrete   Insight:  good   Reliability good   Judgment:  good         Physical Exam:   Visit Vitals    /80    Pulse 94    Temp 97.4 °F (36.3 °C)    Resp 16    Ht 6' (1.829 m)    Wt 98.9 kg (218 lb)    SpO2 98%    BMI 29.57 kg/m2     General appearance: alert, cooperative, no distress, appears stated age  Head: Normocephalic, without obvious abnormality, atraumatic  Eyes: negative  Ears: normal TM's and external ear canals AU  Nose: Nares normal. Septum midline. Mucosa normal. No drainage or sinus tenderness. Throat: Lips, mucosa, and tongue normal. Teeth and gums normal  Neck: supple, symmetrical, trachea midline, no adenopathy, thyroid: not enlarged, symmetric, no tenderness/mass/nodules, no carotid bruit and no JVD  Back: symmetric, no curvature. ROM normal. No CVA tenderness. Lungs: clear to auscultation bilaterally  Chest wall: no tenderness  Heart: regular rate and rhythm, S1, S2 normal, no murmur, click, rub or gallop  Abdomen: soft, non-tender. Bowel sounds normal. No masses,  no organomegaly  Extremities: extremities normal, atraumatic, no cyanosis or edema  Pulses: 2+ and symmetric  Skin: Skin color, texture, turgor normal. No rashes or lesions  Lymph nodes: Cervical, supraclavicular, and axillary nodes normal.  Neurologic: Grossly normal        Impression:      Active Problems:    Intentional drug overdose ([address] (5/16/2018)          Plan:     Recommendations for Treatment/Conditions:  Psychiatric treatment recommended while in hospital  Admit to Psych services for OD                                                  Depression                                                  Insomnia    Referral To: Inpatient psychiatric care    Competency Statement: At the current time, the patient is competent to make informed consent regarding their current medical care and discharge planning and/or financial decisions.       [employer], [place]   5/16/2018 8:07 AM

## 2018-05-16 NOTE — BSMART NOTE
SW assessment/Intervention:  Chart reviewed and discussed it reports   The patient is in a wheelchair for this interview. He begins by saying \"Well, I tried to kill myself. ...again\". The patient says that it all started with him losing his job. This event became a family problem as this is not the first time he has been let go. The patient says that he initially took more of his home prescriptions of trazodone and vistaril to get sleep, but then extends that he later took more \"and then I wondered if I could sleep a little more\". The patient says that he came scared and told a relative which led to this admission. Currently he denies any suicidal ideation, but endorses daily depressed mood, anhedonia, fatigue, guilt/worthlessness, and poor concentration. SW made contact with patient as he engaged with others within the milieu. Patient reports feelings of sadness and reports he had suicidal ideations. Patient reports stressors which caused his feelings of sadness and reports he has poor coping skills. SW encouraged patient to attend group activities as well as engage in positive peer interaction. SW will continue to monitor patient during hospitalization consulting with treating psychiatrist to complete disposition.     ISA Schneider    [phone]

## 2018-05-16 NOTE — BSMART NOTE
ACTIVITIES THERAPY PROGRESS NOTE    Group time:6020    The patient did not awaken/get up when called for group.

## 2018-05-16 NOTE — BSMART NOTE
COMPREHENSIVE ASSESSMENT FORM PART 1    SECTION I - DISPOSITION    **STEP DOWN to CSB/CSU needed, after 24-hours, due to overdose attempt. **    The patient was initially interviewed while in Bed/Cid - H 3/C of the [facility] ER at the request of Dr. Lidia Mustafa. The request for crisis evaluation is due to patients complaints/comments of intentional overdose. The plan is to transfer the patient from the [facility] ER to [facility] Room #126-02 on the Adult/Chemical Dependency Unit. The unit phone is [phone]. The on-call Psychiatrist consulted was Dr. Stanly Felty. The attending Psychiatrist will be Dr. Lynne Kent. [facility] unit admission is for:  Intentional Overdose, Depression. ADMISSION ORDERS:  - Routine PRN admission orders  - At-home medications can be reviewed by attending physician. - Safety precautions / Suicide precautions    SECTION II - INTEGRATED SUMMARY  INFORMATION WAS PROVIDED BY:   Patient  CHIEF COMPLAINT:  Depression, Suicidal behavior  PRECIPITANT FACTORS:  Loss of his job at [employer] one month ago. Patient is a 30year old male who has presented to the Emergency Room on multiple occasions for a crisis evaluation. Patient complains of intentional overdose of Trazadone (11, 50 mg pills); increased depression since \"losing my job at [employer] about a month ago. I am feeling hopeless. \"  He resides with his parents, his wife and two children. He states he has a diagnosis of Major Depressive Disorder. SI / SELF HARM HX / HI:  Current SI - intentional overdose of Trazodone. PRIOR TREATMENT HX:  INPATIENT:   [facility] BMS 3/5-12/18 for intentional overdose; CSU placement in the past.   OUTPATIENT:   Dr. Gorge Marcelo who was last seen \"about one month ago\" with an upcoming appointment on 5/24/18. [address]  Patient is dressed in hospital paper scrubs and slip resistant socks.   His speech is normal.  His affect is calm and cooperative while responding to questions asked.  She/He is alert and oriented to person, place, time and situation. The patient is a danger to self as he has attempted to harm himself by intentional overdose.         Heber Spence, RN, BSN

## 2018-05-16 NOTE — PROGRESS NOTES
Patient is alert and oriented x 4, escorted to ADCD Unit by , pleasant and cooperative; flat affect, \"depressed\" mood. Patient is admitted for intentional overdose of medications; stated, \"I took some pills. \" Patient verbalized that he has been feeling depressed for a couple of days, loss my job, and having family issues. Patient did not elaborate about the \"family issues. \" Patient tolerated well sandwich, oriented to room/unit routines, then retired to bed. Also, patient uses a cane to assist with ambulation; however, cane replaced with w/c d/t slightly unsteady gait and safety.
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
## 2018-05-16 NOTE — ROUTINE PROCESS
Pt brought trazodone bottle with 4 tablet, medication sealed and locked in pt's locker per protocol.

## 2018-05-16 NOTE — ED TRIAGE NOTES
EMS to ED, patient found A+O x3, verbalized to medics, SI, took 11 Trazodone=550 mg independently. PMH Depression, and SI attempt 04/2018.

## 2018-05-16 NOTE — BH NOTES
Pt has been out of room mainly for meals and to speak wit doctor. Minimal   interaction with others unless approached. Is not very disclosing regarding any   issues. No self harm thoughts Encouraged to participate in treatment and continue   safety on unit wearing non skid socks and continued use of wheelchair.

## 2018-05-17 LAB
ATRIAL RATE: 73 BPM
ATRIAL RATE: 84 BPM
CALCULATED P AXIS, ECG09: 45 DEGREES
CALCULATED P AXIS, ECG09: 47 DEGREES
CALCULATED R AXIS, ECG10: 14 DEGREES
CALCULATED R AXIS, ECG10: 25 DEGREES
CALCULATED T AXIS, ECG11: 24 DEGREES
CALCULATED T AXIS, ECG11: 43 DEGREES
DIAGNOSIS, 93000: NORMAL
DIAGNOSIS, 93000: NORMAL
P-R INTERVAL, ECG05: 144 MS
P-R INTERVAL, ECG05: 164 MS
Q-T INTERVAL, ECG07: 366 MS
Q-T INTERVAL, ECG07: 384 MS
QRS DURATION, ECG06: 92 MS
QRS DURATION, ECG06: 94 MS
QTC CALCULATION (BEZET), ECG08: 403 MS
QTC CALCULATION (BEZET), ECG08: 453 MS
VENTRICULAR RATE, ECG03: 73 BPM
VENTRICULAR RATE, ECG03: 84 BPM

## 2018-05-17 PROCEDURE — 74011250637 HC RX REV CODE- 250/637: Performed by: STUDENT IN AN ORGANIZED HEALTH CARE EDUCATION/TRAINING PROGRAM

## 2018-05-17 PROCEDURE — 65220000005 HC RM SEMIPRIVATE PSYCH 3 OR 4 BED

## 2018-05-17 PROCEDURE — 93005 ELECTROCARDIOGRAM TRACING: CPT

## 2018-05-17 RX ADMIN — VENLAFAXINE HYDROCHLORIDE 150 MG: 150 CAPSULE, EXTENDED RELEASE ORAL at 08:10

## 2018-05-17 RX ADMIN — HYDROXYZINE PAMOATE 50 MG: 50 CAPSULE ORAL at 13:19

## 2018-05-17 RX ADMIN — TRAZODONE HYDROCHLORIDE 50 MG: 50 TABLET ORAL at 21:10

## 2018-05-17 NOTE — PROGRESS NOTES
Problem: Suicide/Homicide (Adult/Pediatric)  Goal: *STG: Remains safe in hospital  Pt will contract for safety daily during this admission. Outcome: Progressing Towards Goal  Patient has remained free of harm to self and others while in facility.s   Goal: *STG: Attends activities and groups  Pt to attend 3 out 5 group activities daily. Outcome: Progressing Towards Goal  Patient has agreed to attend at least 1 daily group throughout the day. Goal: *STG/LTG: Complies with medication therapy  Pt to take medication as prescribed by MD daily. Outcome: Progressing Towards Goal  Patient has been compliant with prescribed medications. Comments: Patient has been visible in day area this am with medication compliance. Patient complained of feeling a \"little tight, last night\". Patient vitals obtained (see doc flowsheet). MD notified of patient's status with MD to order EKG at this time. Patient denies SI/HI, denies A/VH. Patient has been social and interactive with peers when in milieu. Will monitor for safety with support as needed throughout treatment regimen.

## 2018-05-17 NOTE — BSMART NOTE
SOCIAL WORK GROUP THERAPY PROGRESS NOTE    Group Time:  11AM    Group Topic:  Coping Skills    C D Issues    Group Participation:      Pt moderately involved during group discussion but remained attentive. Emphasis of session was presentation of basic \"CBT\" principles including diagram of the process, as well as list of typical cognitive distortions. Reviewed strategies to keep a \"Journal\" for moods, cognitions, behavior & outcome.

## 2018-05-17 NOTE — PROGRESS NOTES
[address] [facility] [address]          Inpatient Psychiatry Progress Note      Date of Service: 05/17/18    Hospital Day: 2     Subjective/Interval History        Treatment Team Notes: Patient complained of intermittent chest pain yesterday afternoon. EKG was ordered-NSR, no abmls        Patient Mir Perea was interviewed by this writer today. Patient says that things are going okay. He feels that going up on the Effexor has been okay. His only complaint is intermittent chest pain. The patient denies prior history of reflux. He denies any SI/HI. The patient says that he ambulates at a frequency of once per hour. The patient is planning to use the rest of his day to attend groups. He has not reached out to his family yet, because he fears that they may be counterproductive to his needs for mental health treatment. \"They'll just make me feel like a failure for having to be here\".      Objective           Patient Vitals for the past 12 hrs:   Temp Pulse Resp BP   05/17/18 0830 97.3 °F (36.3 °C) 64 16 126/86   05/17/18 0800 98.5 °F (36.9 °C) 97 18 145/89     EKG-negative     Mental Status Examination      Appearance/Hygiene In casual clothes, wheelchair, NAD   Behavior/Social Relatedness Cooperative, detached   Musculoskeletal Gait/Station: not ambulating for interview, patient does ambulate swiftly from bed to chair for this interview  Tone (flaccid, cogwheeling, spastic): not assessed  Psychomotor (hyperkinetic, hypokinetic): none  Involuntary movements (tics, dyskinesias, akathisa, stereotypies): none    Speech                          Normal pitch and tone    Mood                          Depressed    Affect                                                   Blunted    Thought Process Linear, logical    Thought Content and Perceptual Disturbances Denies SI/HI, automatic negative thinking patterns.    Sensorium and Cognition              Alert    Insight              Poor    Judgment Fair        Assessment/Plan       Psychiatric Diagnoses:   MDD, recurrent, severe without psychosis F33.2  R/o Somatic Symptom Disorder       Medical Diagnoses:   Physical immobility s/p recurrent knee injury      Psychosocial and contextual factors:scant social support, poor coping mechanisms, financial stressors.      Level of impairment/disability: moderate      Summary: Patient is a 31 yo male hx of depression presents s/p overdose. The patient presents with symptoms that do suggest depressive episode in the setting of low dose antidepressant. It is likely that the patient would benefit from titration of SNRI along with acute inpatient stabilization. Assessment of ongoing risk factors is essential at this time.     Plan:  1. Continue with inpatient psychiatric treatment for safety, stabilization, and medication management, Team Anderson   2. Continue with suicide precautions  3. Patient is to continue with Art/OT and family therapy sessions  4. Will need to talk with outpatient providers for more collateral  5. Will need to talk with family/ friends for more collateral  6. Medications:                     -Will resume Effexor XR to 150 mg Po qam                     -Will resume Trazodone 50 mg PO qhs  7. General Medical:    Chronic Pain/Physical Limitation: patient has wheelchair, stanley at home will not be allowed on unit, fall precautions. Will continue to monitor chest pain, EKG negative on 5/17/18. Consider anti-reflux meds if symptoms consistent with   GERD    8. SW to help with disposition  9.  ELOS 5-7 days        MD DR. LASHAY [facility]  Psychiatry

## 2018-05-17 NOTE — BH NOTES
Lazaro Jerez is not participating in [address] Group    Group time: 1hour    Personal goal for participation:  Seek information on Alcohol      Goal orientation: passive    Group therapy participation:   refuse    Therapeutic interventions reviewed and discussed: Staff encouraged Pt. To participate in Group    Impression of participation:   Pt.  Refuse, chose to rest in bed

## 2018-05-17 NOTE — BSMART NOTE
ART ACTIVITY PROGRESS NOTE    PATIENT SCHEDULED FOR GROUP AT :  1400    ART ACTIVITY:  Personal Statement Trevor    ATTENDANCE : patient attended the full session. PARTICIPATION LEVEL : Participates fully in the art process. ATTENTION LEVEL :  Needs occasional re-direction. Patient came willingly to group. He appeared eager for the attend of speaking up in group and tended to dominate the discussion. Patient rather adamentally identified himself as \"BJ\", saying that he only tolerated \"Marcial\" in a professional setting. Patient talked at length about his wife saying that she wanted to [facility] out\" from  in the family. In exploring that, he did not seem to see what her view of the  burden was, feeling that she had a break when the toddler was down for a nap. When pressed to consider talking with her about having a set hour that could be \"Mom's time\", it seems that the patient is also burdened with undefined responsibilities. It would seem like family therapy would help sort this out. Patient talked with pressured speech when it was his turn to talk. He did allow others their turn. Mood appeared anxious.

## 2018-05-17 NOTE — BSMART NOTE
TARYN assessment/Intervention:  Chart reviewed and discussed it reports:  James Leung interviewed by this writer today. Patient says that things are going okay. He feels that going up on the Effexor has been okay. His only complaint is intermittent chest pain. The patient denies prior history of reflux. He denies any SI/HI. The patient says that he ambulates at a frequency of once per hour. The patient is planning to use the rest of his day to attend groups. He has not reached out to his family yet, because he fears that they may be counterproductive to his needs for mental health treatment. \"They'll just make me feel like a failure for having to be here. SW made contact with patient as he engaged with others within the milieu. Patient addressed his stressors and reports he is hurt he lost his job. Patient reported he is also having problems with his family. SW requested to contact patients family and he declined and stated he does not them involved with his care. SW will continue to monitor patient during hospitalization. SW will consult with treating psychiatrist to complete disposition.     ISA Zamudio, LCSW-E    [phone]

## 2018-05-17 NOTE — BH NOTES
Pt participated in scheduled groups. Pt. has been polite and cooperative in the milieu interacting with staff and peers. Pt. denies SI,HI and AVH today. Pt contracts for safety on the unit agree to come to staff if feeling harm to self or others. Pt.denies any new medical/pain complaints. Pt. ate 100% of meals and took scheduled medications. Pt. did not have any visitors. Staff encouraged Pt. to  participate in treatment,  medication and group therapy. Pt agreed. Pt. remain free of falls and provided non skid socks. Staff will continue to monitor Pt. for behavior safety and location.

## 2018-05-17 NOTE — BH NOTES
Bentley Miranda  is participating in Self Expression Group. Group time: 2000    Personal goal for participation: express what you're going through    Goal orientation: social    Group therapy participation: active    Therapeutic interventions reviewed and discussed: The  Card game with questions you always wanted to ask and questions you hope someone will ask you. Impression of participation: Pt fully participated in answering Entertaining and Educational questions where everyone wins.

## 2018-05-17 NOTE — BH NOTES
GROUP THERAPY PROGRESS NOTE    Berny Larsen is participating in Substance abuse.      Group time: 45 minutes    Personal goal for participation: Overcoming addiction    Goal orientation: social    Group therapy participation: active    Therapeutic interventions reviewed and discussed: Getting treatment for using drugs    Impression of participation: Patient has fully participated

## 2018-05-17 NOTE — BH NOTES
GROUP THERAPY PROGRESS NOTE    Shayla Dial is not participating in [place]. Group time: 30 minutes  Pt declined group at this time.

## 2018-05-18 PROCEDURE — 65220000005 HC RM SEMIPRIVATE PSYCH 3 OR 4 BED

## 2018-05-18 PROCEDURE — 74011250637 HC RX REV CODE- 250/637: Performed by: STUDENT IN AN ORGANIZED HEALTH CARE EDUCATION/TRAINING PROGRAM

## 2018-05-18 RX ORDER — IPRATROPIUM BROMIDE 42 UG/1
2 SPRAY, METERED NASAL 2 TIMES DAILY
Status: DISCONTINUED | OUTPATIENT
Start: 2018-05-18 | End: 2018-05-22 | Stop reason: HOSPADM

## 2018-05-18 RX ORDER — LORATADINE 10 MG/1
10 TABLET ORAL DAILY
Status: DISCONTINUED | OUTPATIENT
Start: 2018-05-18 | End: 2018-05-22 | Stop reason: HOSPADM

## 2018-05-18 RX ORDER — FLUTICASONE PROPIONATE 50 MCG
2 SPRAY, SUSPENSION (ML) NASAL DAILY
Status: DISCONTINUED | OUTPATIENT
Start: 2018-05-18 | End: 2018-05-20

## 2018-05-18 RX ADMIN — IPRATROPIUM BROMIDE 2 SPRAY: 42 SPRAY NASAL at 12:25

## 2018-05-18 RX ADMIN — IPRATROPIUM BROMIDE 2 SPRAY: 42 SPRAY NASAL at 20:40

## 2018-05-18 RX ADMIN — TRAZODONE HYDROCHLORIDE 50 MG: 50 TABLET ORAL at 20:41

## 2018-05-18 RX ADMIN — LORATADINE 10 MG: 10 TABLET ORAL at 12:24

## 2018-05-18 RX ADMIN — VENLAFAXINE HYDROCHLORIDE 150 MG: 150 CAPSULE, EXTENDED RELEASE ORAL at 08:12

## 2018-05-18 RX ADMIN — FLUTICASONE PROPIONATE 2 SPRAY: 50 SPRAY, METERED NASAL at 12:25

## 2018-05-18 NOTE — PROGRESS NOTES
Problem: Suicide/Homicide (Adult/Pediatric)  Goal: *STG: Remains safe in hospital  Pt will contract for safety daily during this admission. Outcome: Progressing Towards Goal  Patient has remained remain free of harm to self and others while in facility. Goal: *STG/LTG: Complies with medication therapy  Pt to take medication as prescribed by MD daily. Outcome: Progressing Towards Goal  Patient has been compliant with prescribed medication. Comments: Patient has been visible within milieu and has been cooperative upon approach. Patient has been social and interactive when in milieu. Patient reported sleeping well throughout the night \"It was actually good,  Especially once I got settled in I slept well\". Patient denies suicidal ideation with no safety issues noted, denies auditory hallucinations . Will monitor for safety with support as needed throughout treatment regimen.

## 2018-05-18 NOTE — BH NOTES
Pt refused AA group. Staff encouraged pt to bathe in order  Look and feel better, staff offered to wash pt clothes pt refuse. Pt. has been polite and cooperative in the milieu interacting with staff and peers. Pt. denies SI,HI and AVH today. Pt contracts for safety on the unit agree to come to staff if feeling harm to self or others. Pt.denies any new medical/pain complaints. Pt. ate 100% of meals and took scheduled medications. Pt. did not have any visitors. Staff encouraged Pt. to  participate in treatment,  medication and group therapy. Pt agreed. Pt. remain free of falls and provided non skid socks. Staff will continue to monitor Pt. for behavior safety and location.

## 2018-05-18 NOTE — PROGRESS NOTES
[address] [facility] [address]          Inpatient Psychiatry Progress Note      Date of Service: 05/18/18    Hospital Day: 3     Subjective/Interval History        Treatment Team Notes:  Needs occasional re-direction. Patient came willingly to group. He appeared eager for the attend of speaking up in group and tended to dominate the discussion. Patient rather adamentally identified himself as \"BJ\", saying that he only tolerated \"Marcial\" in a professional setting. Patient talked at length about his wife saying that she wanted to [facility] out\" from  in the family. In exploring that, he did not seem to see what her view of the  burden was, feeling that she had a break when the toddler was down for a nap. When pressed to consider talking with her about having a set hour that could be \"Mom's time\", it seems that the patient is also burdened with undefined responsibilities. It would seem like family therapy would help sort this out. Patient talked with pressured speech when it was his turn to talk. He did allow others their turn. Mood appeared anxious.                       Patient interview:Marcial Barnes Earnest was interviewed by this writer today. He is in bed. Patient says that things are still very much depressing. He uses this interview to mention his needs to resume prior allergic rhinitis medications that were prescribed on last encounter. The patient denies any side effects from current regimen. He has yet to talk with his family as he continues to feel strongly about it being counterproductive to his recovery. He denies any SI/HI, manic or psychotic symptoms. The patient is planning to get up for groups soon, as he found the ones from yesterday to be helpful.        Objective             Patient Vitals for the past 12 hrs:   Temp Pulse Resp BP   05/18/18 0745 97.5 °F (36.4 °C) 90 18 122/82     EKG-negative     Mental Status Examination      Appearance/Hygiene In casual clothes, laying in bed   Behavior/Social Relatedness Cooperative, detached   Musculoskeletal Gait/Station: not ambulating for interview, patient does ambulate swiftly from bed to chair for this interview  Tone (flaccid, cogwheeling, spastic): not assessed  Psychomotor (hyperkinetic, hypokinetic): none  Involuntary movements (tics, dyskinesias, akathisa, stereotypies): none    Speech                          Normal pitch and tone    Mood                          Depressed    Affect                                                   Blunted    Thought Process Linear, logical    Thought Content and Perceptual Disturbances Denies SI/HI, automatic negative thinking patterns. Sensorium and Cognition              Alert    Insight              Poor    Judgment Fair           Assessment/Plan       Psychiatric Diagnoses:   MDD, recurrent, severe without psychosis F33.2  R/o Somatic Symptom Disorder       Medical Diagnoses:   Physical immobility s/p recurrent knee injury      Psychosocial and contextual factors:scant social support, poor coping mechanisms, financial stressors.      Level of impairment/disability: moderate      Summary: Patient is a 31 yo male hx of depression presents s/p overdose. The patient presents with symptoms that do suggest depressive episode with low dose SNRI. Also of note, the patient has a tendency to present with new somatic complaints, currently using wheelchair to ambulate for issues with knees with no pathology being revealed by extensive imaging (per patient). The going strategy is titration of Effexor and monitoring for improvement to a level that would be appropriate for outpatient setting. Assessment of ongoing risk factors is essential at this time.     Plan:  1. Continue with inpatient psychiatric treatment for safety, stabilization, and medication management, Team Chaim Sagastume   2. Continue with suicide precautions  3. Patient is to continue with Art/OT and family therapy sessions  4.  Will need to talk with outpatient providers for more collateral  5. Will need to talk with family/ friends for more collateral  6. Medications:                     -Will resume Effexor XR to 150 mg Po qam                     -Will resume Trazodone 50 mg PO qhs  7. General Medical: Patient maintains strong tendency to develop somatic complaints. Chronic Pain/Physical Limitation: patient has wheelchair, stanley at home will not be allowed on unit, fall precautions.   -Will order for Flonase, Atrovent, and Claritin as patient says that these medications are particularly helpful (when taken in combo) during allergy season. Will continue to monitor chest pain, EKG negative on 5/17/18. Consider anti-reflux meds if symptoms consistent with GERD    8. SW to help with disposition  9.  ELOS 3-5 days        Rositaberny Mast MD DR. [facility]  [facility]

## 2018-05-18 NOTE — BSMART NOTE
SW assessment/Intervention: Chart reviewed and discussed it reports: Patient is a 31 yo male hx of depression presents s/p overdose. The patient presents with symptoms that do suggest depressive episode with low dose SNRI. Also of note, the patient has a tendency to present with new somatic complaints, currently using wheelchair to ambulate for issues with knees with no pathology being revealed by extensive imaging (per patient). The going strategy is titration of Effexor and monitoring for improvement to a level that would be appropriate for outpatient setting. Assessment of ongoing risk factors is essential at this time. SW made contact as he engaged with others within the milieu. Patient engaged in conversation reporting all the places he has traveled around the world. Patient entertained his peers with stories of his encounters in other cities. Patient reports he is feeling well. He reports the medications have been helpful and he is noticing an improvement in his depression. Patient reports his knees are feeling better however, he is being treated for his allergies. Patient denies AVH. Patient denies SI/HI. SW will continue to monitor patient during hospitalization. SW will consult with treating psychiatrist to complete disposition.       ISA Adam    [phone]

## 2018-05-18 NOTE — BSMART NOTE
OCCUPATIONAL THERAPY PROGRESS NOTE  Group Time:  0309  Attendance: The patient attended full group. Participation:  The patient participated fully in the activity. .  Attention:  The patient was able to focus on the activity. Interaction:  The patient frequently interacts with others. Practiced several positive statements to counter some negative things he says to self. Encouraged to deal with issues and reminded he could not do this if he is avoiding or hiding from them.

## 2018-05-18 NOTE — BH NOTES
Kristi Guaman is  participating in Treatment Concerns     Group time: 0388    Personal goal for participation: Community announcement    Goal orientation: community    Group therapy participation: fully participated    Therapeutic interventions reviewed and discussed: Staff discussed  Staff discussed the Mental Health programs offered. Unit schedule for groups,  Visiting hours, patient advocate name and phone number and where this information is posted on the unit, etc. Report any maintenance/housekeeping or treatment concerns to staff so it can be addressed by the Treatment Team.    Impression of participation: Pt.did not have any maintenance/housekeeping or treatment concerns to report to staff .

## 2018-05-18 NOTE — BH NOTES
GROUP THERAPY PROGRESS NOTE    Yogielke Martínez is participating in [employer].      Group time: 30 minutes    Personal goal for participation: feel better today    Goal orientation: community    Group therapy participation: active    Therapeutic interventions reviewed and discussed: treatment goals and gratitude

## 2018-05-18 NOTE — BSMART NOTE
SOCIAL WORK GROUP THERAPY PROGRESS NOTE    Group Time:  11AM    Group Topic:  Coping Skills    Group Participation:   Pt reportedly did not attend group due to medical issues

## 2018-05-19 PROCEDURE — 74011250637 HC RX REV CODE- 250/637: Performed by: STUDENT IN AN ORGANIZED HEALTH CARE EDUCATION/TRAINING PROGRAM

## 2018-05-19 PROCEDURE — 65220000005 HC RM SEMIPRIVATE PSYCH 3 OR 4 BED

## 2018-05-19 RX ADMIN — VENLAFAXINE HYDROCHLORIDE 150 MG: 150 CAPSULE, EXTENDED RELEASE ORAL at 08:27

## 2018-05-19 RX ADMIN — IPRATROPIUM BROMIDE 2 SPRAY: 42 SPRAY NASAL at 20:46

## 2018-05-19 RX ADMIN — TRAZODONE HYDROCHLORIDE 50 MG: 50 TABLET ORAL at 20:47

## 2018-05-19 RX ADMIN — LORATADINE 10 MG: 10 TABLET ORAL at 08:27

## 2018-05-19 RX ADMIN — FLUTICASONE PROPIONATE 2 SPRAY: 50 SPRAY, METERED NASAL at 08:27

## 2018-05-19 RX ADMIN — IPRATROPIUM BROMIDE 2 SPRAY: 42 SPRAY NASAL at 08:28

## 2018-05-19 NOTE — BH NOTES
Pt completed hygiene after being prompted by staff. Pt participated in group. Pt. denies SI,HI and AVH today. Pt contracts for safety on the unit agree to come to staff if feeling harm to self or others. Pt.denies any new medical/pain complaints. Pt. ate 100% of meals and took scheduled medications. Pt. did not have any visitors. Staff encouraged Pt. to  participate in treatment,  medication and group therapy. Pt agreed. Pt. remain free of falls and provided non skid socks. Staff will continue to monitor Pt. for behavior safety and location.

## 2018-05-19 NOTE — PROGRESS NOTES
[facility]     Physician Daily Progress Note    Patient:  Berhane Stephenson Age:  30 y.o. :  1980     SEX:  male MRN:  [id number] [facility]:  [id number]    Admit Date:  5/15/2018 Attending:  Shaunna Brumfield MD    Subjective:  Patient is a 31 yo male hx of depression  admitted for overdose on Trazodone after losing his job. He is  currently using wheelchair to ambulate for issues with knees. He states he lost his job  At [employer] because he was using a cane. He describes his mood as 5/10 today.         Current Medications:    Current Facility-Administered Medications   Medication Dose Route Frequency Provider Last Rate Last Dose    fluticasone (FLONASE) 50 mcg/actuation nasal spray 2 Spray  2 Spray Both Nostrils DAILY Rosita Mast MD   2 [place] at 18 0827    ipratropium (ATROVENT) 42 mcg (0.06 %) nasal spray 2 Spray  2 Spray Both Nostrils BID Rosita Mast MD   2 [place] at 18 2200    loratadine (CLARITIN) tablet 10 mg  10 mg Oral DAILY Rosita Mast MD   10 mg at 18 0827    benztropine (COGENTIN) tablet 1 mg  1 mg Oral Q6H PRN Render MD Cristino        benztropine (COGENTIN) injection 1 mg  1 mg IntraMUSCular Q6H PRN Render MD Cristino        haloperidol (HALDOL) tablet 5 mg  5 mg Oral Q4H PRN Render MD Cristino        haloperidol lactate (HALDOL) injection 5 mg  5 mg IntraMUSCular Q4H PRN Render MD Cristino        ibuprofen (MOTRIN) tablet 400 mg  400 mg Oral Q6H PRN Render MD Cristino        LORazepam (ATIVAN) tablet 1-2 mg  1-2 mg Oral Q4H PRN Render MD Cristino        LORazepam (ATIVAN) injection 1-2 mg  1-2 mg IntraMUSCular Q4H PRN Render MD Cristino        venlafaxine-SR [facility] capsule 150 mg  150 mg Oral DAILY WITH BREAKFAST Rosita Mast MD   150 mg at 18 0827    traZODone (DESYREL) tablet 50 mg  50 mg Oral QHS Rosita Mast MD   50 mg at 18    hydrOXYzine pamoate (VISTARIL) capsule 50 mg  50 mg Oral TID PRN Melina Pennington MD   50 mg at 05/17/18 1319       Compliant with medication:  Yes      Side effects from medications:  No     Mental Status Exam      Appearance    General Behavior   Pleasant and cooperative     Speech form and content,  Language  Associations  Form of Thought   Normal flow and volume  TP : Logical, goal oriented   Mood, Affect  Self-Attitude  Vital Sense  SI/HI/PDW   Depressed   No SI, HI, hopelessness   Abnormal Perceptions and illusions   Denies     Delusions   None   Anxiety    Denies   COGNITION Intelligence Abstraction   Intact   Judgement Insight     Improved      Diagnoses/Impressions:      Psychiatric:    Active Problems:    Intentional drug overdose ([facility] [address] (5/16/2018) POA: Unknown            Medical:     Visit Vitals    /72    Pulse 89    Temp 97.9 °F (36.6 °C)    Resp 19    Ht 6' (1.829 m)    Wt 98.9 kg (218 lb)    SpO2 98%    BMI 29.57 kg/m2       No lab exists for component: 24H    Recommendations/Plan:      []  Dangerous and will not contract for safety in the community    []  Response to medications is not adequate    []  Appropriate disposition not finalized    []  Collateral history needed to determine safety    [x]  Continue current medications and follow MSE for sxs improvement    []  Medication changes as follows:     [x]  Continue to build rapport    [x]  Supportive psychotherapy    [x]  Insight oriented therapy    [x]  Group attendance/processing    [x]  Relapse prevention      [x]  Somatic Management    [x]  Disposition planning                                   Socrates Courtney MD               5/19/2018          4:25 PM

## 2018-05-19 NOTE — PROGRESS NOTES
Problem: Suicide/Homicide (Adult/Pediatric)  Goal: *STG: Remains safe in hospital  Pt will contract for safety daily during this admission. Outcome: Progressing Towards Goal  Pt contracts for safety. Goal: *STG/LTG: Complies with medication therapy  Pt to take medication as prescribed by MD daily. Outcome: Progressing Towards Goal  Pt takes medications as ordered. Problem: Falls - Risk of  Goal: *Absence of Falls  Document Brent Fall Risk and appropriate interventions in the flowsheet. Pt will be free of fall during this admission. Outcome: Progressing Towards Goal  Fall Risk Interventions:  Mobility Interventions: Utilize walker, cane, or other assitive device  Medication Interventions: Teach patient to arise slowly  Pt remains free of falls. Comments: Patient states that he tried to overdose again due to primarily his living situation and stress from losing his job. Patient states that he is constantly hearing what a loser he is and that he cannot hold a job from his parents and wife whom he lives with. 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 Patient sounds hopeful about his future and states that he just needs a \"change of scenery\". Patient denies SI/HI and AVH.

## 2018-05-19 NOTE — BH NOTES
GROUP THERAPY PROGRESS NOTE    Berny Larsen is participating in [place].      Group time: 30 minutes    Personal goal for participation: discuss daily Tx goal(s), discuss guideline compliance, unit issues and community announcements               Goal orientation: personal    Group therapy participation: active

## 2018-05-20 PROCEDURE — 74011250637 HC RX REV CODE- 250/637: Performed by: PSYCHIATRY & NEUROLOGY

## 2018-05-20 PROCEDURE — 74011250637 HC RX REV CODE- 250/637: Performed by: STUDENT IN AN ORGANIZED HEALTH CARE EDUCATION/TRAINING PROGRAM

## 2018-05-20 PROCEDURE — 65220000005 HC RM SEMIPRIVATE PSYCH 3 OR 4 BED

## 2018-05-20 RX ORDER — TRAZODONE HYDROCHLORIDE 100 MG/1
100 TABLET ORAL
Status: DISCONTINUED | OUTPATIENT
Start: 2018-05-20 | End: 2018-05-22 | Stop reason: HOSPADM

## 2018-05-20 RX ORDER — FLUTICASONE PROPIONATE 50 MCG
2 SPRAY, SUSPENSION (ML) NASAL 2 TIMES DAILY
Status: DISCONTINUED | OUTPATIENT
Start: 2018-05-20 | End: 2018-05-22 | Stop reason: HOSPADM

## 2018-05-20 RX ADMIN — IPRATROPIUM BROMIDE 2 SPRAY: 42 SPRAY NASAL at 08:09

## 2018-05-20 RX ADMIN — TRAZODONE HYDROCHLORIDE 100 MG: 100 TABLET ORAL at 20:17

## 2018-05-20 RX ADMIN — LORATADINE 10 MG: 10 TABLET ORAL at 08:09

## 2018-05-20 RX ADMIN — VENLAFAXINE HYDROCHLORIDE 150 MG: 150 CAPSULE, EXTENDED RELEASE ORAL at 08:09

## 2018-05-20 RX ADMIN — Medication 2 SPRAY: at 20:12

## 2018-05-20 RX ADMIN — FLUTICASONE PROPIONATE 2 SPRAY: 50 SPRAY, METERED NASAL at 08:09

## 2018-05-20 RX ADMIN — IPRATROPIUM BROMIDE 2 SPRAY: 42 SPRAY NASAL at 20:12

## 2018-05-20 NOTE — BH NOTES
Patient spent the entire shift laughing, talking and watching TV with peers. Patient reports that he's feeing a bit better today, though still has issues at home that he feels are not resolved. Patient had no visitors this evening. Patient has been medication compliant, ate 100% of meals and snacks and wore non-slip footwear throughout the shift. Patient had no falls this evening.

## 2018-05-20 NOTE — BH NOTES
GROUP THERAPY PROGRESS NOTE    Rexie Spurling is participating in Relaxation group    Group time: 15 min    Personal goal for participation: Being able to make time to relax. Goal orientation: relaxation    Group therapy participation: active    Therapeutic interventions reviewed and discussed: Importance of relaxation.     Impression of participation: Pt participated in group activity

## 2018-05-20 NOTE — PROGRESS NOTES
[place] [address]     Physician Daily Progress Note    Patient:  Matthew Viera Age:  30 y.o. :  1980     SEX:  male MRN:  [id number] [facility]:  [id number]    Admit Date:  5/15/2018 Attending:  Jamari Ling MD    Subjective:  Patient is a 29 yo male hx of depression  admitted for overdose on Trazodone after losing his job. He is  currently using wheelchair to ambulate for issues with knees. He states he lost his job  at [employer] because he was using a cane. He is worried about going home because of the \" negativity\" from his parents and wife.      Current Medications:    Current Facility-Administered Medications   Medication Dose Route Frequency Provider Last Rate Last Dose    fluticasone (FLONASE) 50 mcg/actuation nasal spray 2 Spray  2 Spray Both Nostrils BID Socrates Salcedo MD        traZODone (DESYREL) tablet 100 mg  100 mg Oral QHS Socrates Salcedo MD        ipratropium (ATROVENT) 42 mcg (0.06 %) nasal spray 2 Spray  2 Spray Both Nostrils BID Kevin Wynne MD   2 [place] at 18 0809    loratadine (CLARITIN) tablet 10 mg  10 mg Oral DAILY Kevin Wynne MD   10 mg at 18 0809    benztropine (COGENTIN) tablet 1 mg  1 mg Oral Q6H PRN Tamara Juarez MD        benztropine (COGENTIN) injection 1 mg  1 mg IntraMUSCular Q6H PRN Tamara Juarez MD        haloperidol (HALDOL) tablet 5 mg  5 mg Oral Q4H PRN Tamara Juarez MD        haloperidol lactate (HALDOL) injection 5 mg  5 mg IntraMUSCular Q4H PRN Tamara Juarez MD        ibuprofen (MOTRIN) tablet 400 mg  400 mg Oral Q6H PRN Tamara Juarez MD        LORazepam (ATIVAN) tablet 1-2 mg  1-2 mg Oral Q4H PRN Tamara Juarez MD        LORazepam (ATIVAN) injection 1-2 mg  1-2 mg IntraMUSCular Q4H PRN Tamara Juarez MD        venlafaxine-SR [facility] capsule 150 mg  150 mg Oral DAILY WITH BREAKFAST Kevin Wynne MD   150 mg at 18 0809    hydrOXYzine pamoate (VISTARIL) capsule 50 mg  50 mg Oral TID PRN Neli Chaparro MD   50 mg at 05/17/18 1319       Compliant with medication:  Yes      Side effects from medications:  No     Mental Status Exam    Appearance    General Behavior   Pleasant and cooperative     Speech form and content,  Language  Associations  Form of Thought   Normal flow and volume  TP : Logical, goal oriented   Mood, Affect  Self-Attitude  Vital Sense  SI/HI/PDW   Depressed   No SI, HI, hopelessness   Abnormal Perceptions and illusions   Denies     Delusions   None   Anxiety    Denies   COGNITION Intelligence Abstraction   Intact   Judgement Insight     Improved                                Diagnoses/Impressions:    Psychiatric:    Active Problems:    Intentional drug overdose ([facility] [address] (5/16/2018) POA: Unknown            Medical:     Visit Vitals    /72    Pulse 89    Temp 97.9 °F (36.6 °C)    Resp 19    Ht 6' (1.829 m)    Wt 98.9 kg (218 lb)    SpO2 98%    BMI 29.57 kg/m2       No lab exists for component: 24H    Recommendations/Plan:      []  Dangerous and will not contract for safety in the community    []  Response to medications is not adequate    []  Appropriate disposition not finalized    []  Collateral history needed to determine safety    [x]  Continue current medications and follow MSE for sxs improvement    []  Medication changes as follows:     [x]  Continue to build rapport    [x]  Supportive psychotherapy    [x]  Insight oriented therapy    [x]  Group attendance/processing    [x]  Relapse prevention      [x]  Somatic Management    [x]  Disposition planning                                   [place] Bridgett Rinne, MD               5/20/2018          8:20 AM

## 2018-05-20 NOTE — PROGRESS NOTES
Problem: Suicide/Homicide (Adult/Pediatric)  Goal: *STG: Remains safe in hospital  Pt will contract for safety daily during this admission. Outcome: Progressing Towards Goal  Verbally contracts for safety   Goal: *STG/LTG: Complies with medication therapy  Pt to take medication as prescribed by MD daily. Outcome: Progressing Towards Goal  Compliant     Problem: Falls - Risk of  Goal: *Absence of Falls  Document Brent Fall Risk and appropriate interventions in the flowsheet. Pt will be free of fall during this admission. Fall Risk Interventions:  Mobility Interventions: Utilize walker, cane, or other assitive device         Medication Interventions: Teach patient to arise slowly                  Comments: Pt is pleasant on approach. He states he feels better and has hope for the future. He states he needed a change of scenery and to focus on himself. Pt states his family is disappointed in him and became tearful when talking about his family. He then stated he thinks a lot of what he feels about his family may just be in his head. Pt is very talkative and rambles some. He denies any thoughts of harming self or others and is compliant with medications. He has been visible in the day area all morning. Staff will monitor for safety and continue to provide support.

## 2018-05-20 NOTE — BH NOTES
GROUP THERAPY PROGRESS NOTE    Marty Bloom is participating in [place]. Group time: 30 minutes    Personal goal for participation: unit rules    Goal orientation: community  Group therapy participation: minimal    Therapeutic interventions reviewed and discussed:     Impression of participation: Pt sat in group without concens or complaints.

## 2018-05-21 PROCEDURE — 65220000005 HC RM SEMIPRIVATE PSYCH 3 OR 4 BED

## 2018-05-21 PROCEDURE — 74011250637 HC RX REV CODE- 250/637: Performed by: STUDENT IN AN ORGANIZED HEALTH CARE EDUCATION/TRAINING PROGRAM

## 2018-05-21 PROCEDURE — 74011250637 HC RX REV CODE- 250/637: Performed by: PSYCHIATRY & NEUROLOGY

## 2018-05-21 RX ADMIN — IPRATROPIUM BROMIDE 2 SPRAY: 42 SPRAY NASAL at 20:55

## 2018-05-21 RX ADMIN — VENLAFAXINE HYDROCHLORIDE 150 MG: 150 CAPSULE, EXTENDED RELEASE ORAL at 08:15

## 2018-05-21 RX ADMIN — LORATADINE 10 MG: 10 TABLET ORAL at 08:16

## 2018-05-21 RX ADMIN — TRAZODONE HYDROCHLORIDE 100 MG: 100 TABLET ORAL at 20:53

## 2018-05-21 RX ADMIN — Medication 2 SPRAY: at 20:55

## 2018-05-21 RX ADMIN — Medication 2 SPRAY: at 08:17

## 2018-05-21 RX ADMIN — IPRATROPIUM BROMIDE 2 SPRAY: 42 SPRAY NASAL at 08:18

## 2018-05-21 NOTE — PROGRESS NOTES
Problem: Suicide/Homicide (Adult/Pediatric)  Goal: *STG: Remains safe in hospital  Pt will contract for safety daily during this admission. Outcome: Progressing Towards Goal  Remains safe. Goal: *STG: Attends activities and groups  Pt to attend 3 out 5 group activities daily. Outcome: Progressing Towards Goal  Attending groups. Goal: *STG/LTG: Complies with medication therapy  Pt to take medication as prescribed by MD daily. Outcome: Progressing Towards Goal  Medication compliant. Comments: Cooperative and engage 1:1. He talked about his 2 boys, his living situation,losing his job, and his knee problems. Stated he wants to get better so he can better take care of himself and his family. Stated he been feeling \" lack of purpose \". Stated he got to work on himself to get better. He voiced interest in broadcasting. Stated feeling \" alright \" today. He been out in the milieu watching tv and some socializing. Attended groups. Been medication and diet compliant.

## 2018-05-21 NOTE — BH NOTES
Pt was very sociable and cooperative with staff. Pt participated in group activity and was med compliant. Pt stated to staff that his family has shut him out and not very supportive. Staff encourage the pt to stay focus and remain positive. Staff will continue to monitor for safety and well being.

## 2018-05-21 NOTE — BSMART NOTE
TARYN assessment/Intervention:  SW made contact with patient as he engaged within the milieu. Patient is very talkative and cooperative. Patient addressed concerns with employment and support from his family upon discharge. Patient reports his family continuously makes him feel like a failure. He reports he is unsure if he can continue to reside in his parents home however, he is financially unable to live on his own. Patient reports upon discharge he will need weekly outpatient appointments instead of biweekly. SW informed patient I will assist with scheduling appointments upon discharge. SW encouraged patient to continue group activities and positive peer interaction.       ISA Wagner    [phone]

## 2018-05-21 NOTE — PROGRESS NOTES
[address]  Inpatient Progress Note     Date of Service: 05/21/18  Hospital Day: 5     Subjective/Interval History   05/21/18    Treatment Team Notes:  Notes reviewed and/or discussed and report that Parul Enrique is concerned about his feeling little support from his family. Demonstrates no other interval concerns. Patient interview: Parul Enrique was interviewed by this writer today. Pt is talkative and cooperative. Pt discussed ongoing issues occurring outside of the hospital.  He reports seeing his therapist every 2 weeks but feels seeing the therapist weekly may be of more benefit. Pt feels medications are helpful. He denies medication side effects. Denies SI, HI and AVH. Objective     Visit Vitals    /81 (BP 1 Location: Right arm, BP Patient Position: Sitting)    Pulse 97    Temp 98.6 °F (37 °C)    Resp 18    Ht 6' (1.829 m)    Wt 98.9 kg (218 lb)    SpO2 98%    BMI 29.57 kg/m2       Mental Status Examination     Appearance/Hygiene 30 y.o. AAM with fair hygiene. Behavior/Social Relatedness Appropriate  Relatedness is appropriate  Eye contact is poor   Musculoskeletal Gait/Station: uses a wheelchair for mobility. Gait is antalgic when he does ambulate.    Tone (flaccid, cogwheeling, spastic): not assessed  Psychomotor (hyperkinetic, hypokinetic): appropriate   Involuntary movements (tics, dyskinesias, akathisia, stereotypies): none   Speech   Rate, rhythm, volume, fluency and articulation are appropriate   Mood   restricted   Affect    restricted   Thought Process Linear and goal directed   Thought Content and Perceptual Disturbances Denies self-injurious behavior (SIB), suicidal ideation (SI), aggressive behavior or homicidal ideation (HI)    Denies auditory and visual hallucinations   Sensorium and Cognition  Grossly intact   Insight  fair   Judgment fair        Assessment/Plan      Psychiatric Diagnoses:   Patient Active Problem List Diagnosis Code    Bupropion overdose T43.291A    Suicidal ideation R45.851    MDD (major depressive disorder), recurrent severe, without psychosis ([facility] [address] F33.2    Intentional drug overdose ([facility] [address] T50.902A     Medical Diagnoses:    1. Knee pain    Psychosocial and contextual factors:    1. Stressors of raising children   2. Familial stressors   3. Scant social support   4. Pt feels disconnected from the outside world. Level of impairment/disability: mild to moderate    Marty Bloom is a 30 y.o. who is currently improving. He would benefit from increased outpt psychotherapy. Pt feels medications are helpful. Pt denies SI, HI and AVH. 1.  Depression   - Continue currently  Treatment plan. 2.  Reviewed instructions, risks, benefits and side effects of medications  3. Disposition/Discharge Date: self-care/home, 5-.      MD DR. LASHAY [facility]  Psychiatry

## 2018-05-21 NOTE — BSMART NOTE
OCCUPATIONAL THERAPY PROGRESS NOTE  Group Time:  1815  Attendance: The patient attended full group. Participation:  The patient participated fully in the activity. Attention:  The patient was able to focus on the activity. Interaction:  The patient occasionally  interacts with others. Able to ID goal to work on in taking responsibility for self and his own feelings and behaviors.

## 2018-05-22 VITALS
WEIGHT: 218 LBS | RESPIRATION RATE: 18 BRPM | HEIGHT: 72 IN | TEMPERATURE: 97.8 F | BODY MASS INDEX: 29.53 KG/M2 | OXYGEN SATURATION: 98 % | SYSTOLIC BLOOD PRESSURE: 124 MMHG | DIASTOLIC BLOOD PRESSURE: 82 MMHG | HEART RATE: 82 BPM

## 2018-05-22 PROCEDURE — 74011250637 HC RX REV CODE- 250/637: Performed by: STUDENT IN AN ORGANIZED HEALTH CARE EDUCATION/TRAINING PROGRAM

## 2018-05-22 RX ORDER — VENLAFAXINE HYDROCHLORIDE 150 MG/1
150 CAPSULE, EXTENDED RELEASE ORAL
Qty: 30 CAP | Refills: 0 | Status: SHIPPED | OUTPATIENT
Start: 2018-05-22 | End: 2020-05-20

## 2018-05-22 RX ORDER — TRAZODONE HYDROCHLORIDE 100 MG/1
100 TABLET ORAL
Qty: 30 TAB | Refills: 0 | Status: SHIPPED | OUTPATIENT
Start: 2018-05-22 | End: 2020-05-20

## 2018-05-22 RX ADMIN — LORATADINE 10 MG: 10 TABLET ORAL at 08:37

## 2018-05-22 RX ADMIN — VENLAFAXINE HYDROCHLORIDE 150 MG: 150 CAPSULE, EXTENDED RELEASE ORAL at 08:36

## 2018-05-22 RX ADMIN — Medication 2 SPRAY: at 08:36

## 2018-05-22 RX ADMIN — IPRATROPIUM BROMIDE 2 SPRAY: 42 SPRAY NASAL at 08:36

## 2018-05-22 NOTE — DISCHARGE SUMMARY
[facility]. [facility]  Inpatient Psychiatry   Discharge Summary     Admit date: 5/15/2018    Discharge date and time: 5/22/2018  4:28 PM     Discharge Physician: Gali Forte MD    DISCHARGE DIAGNOSES     Psychiatric Diagnoses:        Patient Active Problem List   Diagnosis Code    Bupropion overdose T43.291A    Suicidal ideation R45.851    MDD (major depressive disorder), recurrent severe, without psychosis ([facility] F33.2    Intentional drug overdose ([facility] [facility] T50.902A      Medical Diagnoses:                            1.  Knee pain     Psychosocial and contextual factors:                            1.  Stressors of raising children                           2.  Familial stressors                           3.  Scant social support                           4.  Pt feels disconnected from the outside [address] presented to the inpatient unit for inpatient psychiatric hospitalization for concerns of suicide attempt.  The patient is able to give a brief overview of recent history of events that led up to admission, as well as background information to complete this evaluation. The patient is in a wheelchair for this interview. He begins by saying \"Well, I tried to kill myself. ...again\". The patient says that it all started with him losing his job. This event became a family problem as this is not the first time he has been let go. The patient says that he initially took more of his home prescriptions of trazodone and vistaril to get sleep, but then extends that he later took more \"and then I wondered if I could sleep a little more\". The patient says that he came scared and told a relative which led to this admission. Currently he denies any suicidal ideation, but endorses daily depressed mood, anhedonia, fatigue, guilt/worthlessness, and poor concentration.       The was continued on Effexor XR which was increased to 150mg po Qam.   The pt's trazodone was increased to 100mg po QHS for insomnia which was helpful. Due to a h/o overdose, the pt's prescriptions were written so the pharmacist would only provide 7 days at a time. The pt began to develop a plan to become more independent and obtain a job away from home. He wants to live on his own as well. He felt this medication may have caused some difficulty sleeping. He remained at 37.5mg po for several days then the dosage was increased to 75mg po Qam on 3-. The pt c/o anxiety for which hydroxyzine 25mg po TID was started with fair to good results. He also used it for insomnia with fair results. The pt discussed some anxiety in anticipation of his family meeting where the pt stated he make take hydroxyzine prior to the meeting. The pt was not a behavioral concern while hospitalized. he attended some groups, was medication compliant and behaviorally appropriate. Pt denied medication side effects including TD, EPS, akathisia and mood derangements. On 05/22/18, the pt was deemed psychiatrically stable and discharged to home. The pt denied SI, HI and AVH. DISPOSITION/FOLLOW-UP     Disposition: home    Follow-up Appointments:  Patient has a therapy appointment with Alber Donahue on 5/25/18 @ 12:00pm  [address]  [phone]:HQJT [phone]:BMX  Patient has med management with Dr. Tomás Abad, per staff patient must reschedule his own appointment on 5/25/18 after counseling session. MEDICATION CHANGES   Outpatient medications:  No current facility-administered medications on file prior to encounter. Current Outpatient Prescriptions on File Prior to Encounter   Medication Sig Dispense Refill    fluticasone (FLONASE) 50 mcg/actuation nasal spray 2 Sprays by Both Nostrils route daily. Indications: Allergic Rhinitis 1 Bottle 0    ipratropium (ATROVENT) 0.03 % nasal spray 2 Sprays by Both Nostrils route two (2) times a day.  Indications: PERENNIAL ALLERGIC RHINITIS 30 mL 0    loratadine (CLARITIN) 10 mg tablet Take 1 Tab by mouth daily. Indications: Allergic Rhinitis 30 Tab 0         Medications discontinued during hospitalization:  Medications Discontinued During This Encounter   Medication Reason    traZODone (DESYREL) tablet 50 mg Duplicate Order    fluticasone (FLONASE) 50 mcg/actuation nasal spray 2 Spray     traZODone (DESYREL) tablet 50 mg     venlafaxine-SR (EFFEXOR-XR) 75 mg capsule     traZODone (DESYREL) 50 mg tablet          Discharged medication:  Current Discharge Medication List      CONTINUE these medications which have CHANGED    Details   venlafaxine-SR (EFFEXOR-XR) 150 mg capsule Take 1 Cap by mouth daily (with breakfast). Indications: major depressive disorder  Qty: 30 Cap, Refills: 0      traZODone (DESYREL) 100 mg tablet Take 1 Tab by mouth nightly as needed for Sleep. Indications: insomnia associated with depression  Qty: 30 Tab, Refills: 0         CONTINUE these medications which have NOT CHANGED    Details   fluticasone (FLONASE) 50 mcg/actuation nasal spray 2 Sprays by Both Nostrils route daily. Indications: Allergic Rhinitis  Qty: 1 Bottle, Refills: 0      ipratropium (ATROVENT) 0.03 % nasal spray 2 Sprays by Both Nostrils route two (2) times a day. Indications: PERENNIAL ALLERGIC RHINITIS  Qty: 30 mL, Refills: 0      loratadine (CLARITIN) 10 mg tablet Take 1 Tab by mouth daily. Indications: Allergic Rhinitis  Qty: 30 Tab, Refills: 0             Instructions, risks (black box warning), benefits and side effects (EPS, TD, NMS) were discussed in detail prior to discharge. Patient denied any adverse medication side effects prior to discharge.        LABS/IMAGING DURING ADMISSION     Results for orders placed or performed during the hospital encounter of 05/15/18   CBC WITH AUTOMATED DIFF   Result Value Ref Range    WBC 8.6 4.6 - 13.2 K/uL    RBC 5.31 4.70 - 5.50 M/uL    HGB 14.6 13.0 - 16.0 g/dL    HCT 43.5 36.0 - 48.0 %    MCV 81.9 74.0 - 97.0 FL    MCH 27.5 24.0 - 34.0 PG    MCHC 33.6 31.0 - 37.0 g/dL    RDW 12.6 11.6 - 14.5 %    PLATELET 162 742 - 561 K/uL    MPV 9.1 (L) 9.2 - 11.8 FL    NEUTROPHILS 66 40 - 73 %    LYMPHOCYTES 27 21 - 52 %    MONOCYTES 6 3 - 10 %    EOSINOPHILS 1 0 - 5 %    BASOPHILS 0 0 - 2 %    ABS. NEUTROPHILS 5.7 1.8 - 8.0 K/UL    ABS. LYMPHOCYTES 2.3 0.9 - 3.6 K/UL    ABS. MONOCYTES 0.5 0.05 - 1.2 K/UL    ABS. EOSINOPHILS 0.1 0.0 - 0.4 K/UL    ABS. BASOPHILS 0.0 0.0 - 0.1 K/UL    DF AUTOMATED     METABOLIC PANEL, COMPREHENSIVE   Result Value Ref Range    Sodium 141 136 - 145 mmol/L    Potassium 3.8 3.5 - 5.5 mmol/L    Chloride 107 100 - 108 mmol/L    CO2 27 21 - 32 mmol/L    Anion gap 7 3.0 - 18 mmol/L    Glucose 82 74 - 99 mg/dL    BUN 17 7.0 - 18 MG/DL    Creatinine 0.90 0.6 - 1.3 MG/DL    BUN/Creatinine ratio 19 12 - 20      GFR est AA >60 >60 ml/min/1.73m2    GFR est non-AA >60 >60 ml/min/1.73m2    Calcium 8.1 (L) 8.5 - 10.1 MG/DL    Bilirubin, total 0.3 0.2 - 1.0 MG/DL    ALT (SGPT) 46 16 - 61 U/L    AST (SGOT) 14 (L) 15 - 37 U/L    Alk.  phosphatase 85 45 - 117 U/L    Protein, total 7.2 6.4 - 8.2 g/dL    Albumin 3.8 3.4 - 5.0 g/dL    Globulin 3.4 2.0 - 4.0 g/dL    A-G Ratio 1.1 0.8 - 1.7     CARDIAC PANEL,(CK, CKMB & TROPONIN)   Result Value Ref Range     39 - 308 U/L    CK - MB <1.0 <3.6 ng/ml    CK-MB Index  0.0 - 4.0 %     CALCULATION NOT PERFORMED WHEN RESULT IS BELOW LINEAR LIMIT    Troponin-I, Qt. <0.02 0.0 - 2.183 NG/ML   SALICYLATE   Result Value Ref Range    Salicylate level <8.8 (L) 2.8 - 20.0 MG/DL   DRUG SCREEN, URINE   Result Value Ref Range    BENZODIAZEPINES NEGATIVE  NEG      BARBITURATES NEGATIVE  NEG      THC (TH-CANNABINOL) NEGATIVE  NEG      OPIATES NEGATIVE  NEG      PCP(PHENCYCLIDINE) NEGATIVE  NEG      COCAINE NEGATIVE  NEG      AMPHETAMINES NEGATIVE  NEG      METHADONE NEGATIVE  NEG      HDSCOM (NOTE)    ETHYL ALCOHOL   Result Value Ref Range    ALCOHOL(ETHYL),SERUM <3 0 - 3 MG/DL   ACETAMINOPHEN   Result Value Ref Range    Acetaminophen level <2 (L) 10.0 - 30.0 ug/mL   EKG, 12 LEAD, SUBSEQUENT   Result Value Ref Range    Ventricular Rate 84 BPM    Atrial Rate 84 BPM    P-R Interval 164 ms    QRS Duration 92 ms    Q-T Interval 384 ms    QTC Calculation (Bezet) 453 ms    Calculated P Axis 45 degrees    Calculated R Axis 14 degrees    Calculated T Axis 24 degrees    Diagnosis       Normal sinus rhythm  Normal ECG  When compared with ECG of 04-MAR-2018 12:21,  No significant change was found  Confirmed by Brain Gusman ([id number]) on 5/17/2018 8:53:02 AM     EKG, 12 LEAD, INITIAL   Result Value Ref Range    Ventricular Rate 73 BPM    Atrial Rate 73 BPM    P-R Interval 144 ms    QRS Duration 94 ms    Q-T Interval 366 ms    QTC Calculation (Bezet) 403 ms    Calculated P Axis 47 degrees    Calculated R Axis 25 degrees    Calculated T Axis 43 degrees    Diagnosis       Normal sinus rhythm  Normal ECG  When compared with ECG of 15-MAY-2018 23:25,  No significant change was found  Confirmed by Bravo Meneses MD, ----- ([id number]) on 5/17/2018 3:17:31 PM          DISCHARGE MENTAL STATUS EVALUATION     Appearance/Hygiene 30 y.o. AAM with good hygiene. Behavior/Social Relatedness Appropriate   Musculoskeletal Gait/Station: appropriate  Tone (flaccid, cogwheeling, spastic): not assessed  Psychomotor (hyperkinetic, hypokinetic): appropriate   Involuntary movements (tics, dyskinesias, akathisia, stereotypies): none   Speech                          Rate, rhythm, volume, fluency and articulation are appropriate   Mood                          \"I'm fine. \"    Affect                                                   Somewhat restricted   Thought Process Linear and goal directed.    Thought Content and Perceptual Disturbances Family issues/concerns.       Denies self-injurious behavior (SIB), suicidal ideation (SI), aggressive behavior or homicidal ideation (HI)     Denies auditory and visual hallucinations   Sensorium and Cognition Grossly intact   Insight              fair   Judgment fair          SUICIDE RISK ASSESSMENT     [] Admission  [x] Discharge     Key Factors:   Current admission precipitated by suicide attempt?   [x]  Yes     2    []  No     1     Suicide Attempt History  [x] Past attempts of high lethality    2 []  Past attempts of low lethality    1 []  No previous attempts       0   Suicidal Ideation []  Constant suicidal thoughts      2 []  Intermittent or fleeting suicidal  thoughts  1 [x]  Denies current suicidal thoughts    0   Suicide Plan   []  Has plan with actual OR potential access to planned method    2 []  Has plan without access to planned method      1 [x]  No plan            0   Plan Lethality []  Highly lethal plan (Carbon monoxide, gun, hanging, jumping)    2 []  Moderate lethality of plan          1 [x]  Low lethality of plan (biting, head banging, superficial scratching, pillow over face)  0   Safety Plan Agreement  []  Unwilling OR unable to agree due to impaired reality testing   2   []  Patient is ambivalent and/or guarded      1 [x]  Reliably agrees        0   Current Morbid Thoughts (reunion fantasies, preoccupations with death) []  Constantly     2     []  Frequently    1 [x]  Rarely    0   Elopement Risk  []  High risk     2 []  Moderate risk    1 [x]   Low risk    0   Symptoms    []  Hopeless  []  Helpless  []  Anhedonia   []  Guilt/shame  []  Anger/rage  []  Anxiety  []  Insomnia   []  Agitation   []  Impulsivity  []  5-6 symptoms present    2 []  3-4 symptoms present    1  [x]  0-2 symptoms present    0     Scoring Key:  10 or higher = Imminent Risk (consider 1:1)  4 - 9 = Moderate Risk (consider q 15 minute observation)Attended alcohol, tobacco, prescription and other drug psychoeducation group.   0 - 3 = Low Risk (consider q 30 minute observation)    Total Score: 4  ------------------------------------------------------------------------------------------------------------------  PLEASE ADDRESS THE FOLLOWING 5 ISSUES     Physician's Subjective Appraisal of Risk (check one):  []  Patient replies not trustworthy: several non-verbal cues. []  Patient replies questionable: trustworthy: at least 1 non-verbal cue. [x]  Patient replies appear trustworthy. Protective measures (select all that apply):  [x]  Successful past responses to stress  [x]  Spiritual/Christian beliefs  [x]  Capacity for reality testing  [x]  Positive therapeutic relationships  [x]  Social supports/connections  [x]  Positive coping skills  [x]  Frustration tolerance/optimism  [x]  Children or pets in the home  [x]  Sense of responsibility to family  [x]  Agrees to treatment plan and follow up    Others (list):    High Risk Diagnoses (select all that apply):  [x]  Depression/Bipolar Disorder  []  Dual Diagnosis  []  Cardiovascular Disease  []  Schizophrenia  []  Chronic Pain  []  Epilepsy  []  Cancer  []  Personality Disorder  []  HIV/AIDS  []  Multiple Sclerosis    Dangerousness Assessment (Suicide, homicide, property destruction. ..)    Risk Factors reviewed and risk assessed to be:  [] low  [x] low-moderate  [] moderate   [] moderate-high  [] high     Protection factors reviewed and risk assessed to be:  [x] low  [] low-moderate  [] moderate   [] moderate-high  [] high     Response to treatment and risk assessed to be:  [x] low  [] low-moderate  [] moderate   [] moderate-high  [] high     Support reviewed and risk assessed to be:  [x] low  [] low-moderate  [] moderate   [] moderate-high  [] high     Acceptance of Discharge and outpatient treatment reviewed and risk assessed to be:    [x] low  [] low-moderate  [] moderate   [] moderate-high  [] high   Overall risk assessed to be:  [x] low  [x] low-moderate  [] moderate   [] moderate-high  [] high     Completion of discharge was greater than 30 minutes. Over 50% of today's discharge was geared towards counseling and coordination of care.           Finn Zhong MD  Psychiatry  [facility] [address]

## 2018-05-22 NOTE — BH NOTES
GROUP THERAPY PROGRESS NOTE    Rexie Spurling is participating in [place].      Group time: 20 minutes    Personal goal for participation: learn to help myself     Goal orientation: community    Group therapy participation: active    Therapeutic interventions reviewed and discussed: unit guidelines and personal goals

## 2018-05-22 NOTE — BH NOTES
Patient attended recreational group today, he was very sociable among the other patients, he ate all meals and took his medication, staff will continue to monitor patient for safety.

## 2018-05-22 NOTE — DISCHARGE INSTRUCTIONS
BEHAVIORAL HEALTH NURSING DISCHARGE NOTE      The following personal items collected during your admission are returned to you:   Dental Appliance:    Vision: Visual Aid: None  Hearing Aid:    Jewelry:    Clothing: Clothing: Footwear, Pants, Shirt, Socks, Undergarments  Other Valuables: Other Valuables: [address], [address], Eyeglasses, Keys, Wallet (Black duffle bag in storage room.)  Valuables sent to safe: Personal Items Sent to Safe: 3 visa cards and va drivers      PATIENT INSTRUCTIONS:    Diet: Regular    The discharge information has been reviewed with the patient. The patient verbalized understanding. Patient armband removed and shredded.

## 2018-05-22 NOTE — BH NOTES
Patient is alert and oriented and ambulatory with use of personal cane. Patient has copy of discharge paperwork with follow up appointment. Patient denies thoughts of self harm or harm to others at this time. Patient has prescriptions to be filled at pharmacy of choice. Patient has all personal belongings to include valuables sent to security and duffel bag held in storage. Patient armband taken and shredded at time of discharge. Patient was given bus pass for transportation to home address. Patient was escorted to lobby by [facility].

## 2018-06-22 ENCOUNTER — HOSPITAL ENCOUNTER (OUTPATIENT)
Dept: ULTRASOUND IMAGING | Age: 38
Discharge: HOME | End: 2018-06-22
Attending: NURSE PRACTITIONER
Payer: MEDICAID

## 2018-06-22 DIAGNOSIS — R10.11 RIGHT UPPER QUADRANT ABDOMINAL PAIN: ICD-10-CM

## 2018-06-22 PROCEDURE — 76700 US EXAM ABDOM COMPLETE: CPT

## 2018-06-22 PROCEDURE — 76705 ECHO EXAM OF ABDOMEN: CPT

## 2020-01-21 ENCOUNTER — OFFICE VISIT (OUTPATIENT)
Dept: ORTHOPEDIC SURGERY | Age: 40
End: 2020-01-21

## 2020-01-21 VITALS
SYSTOLIC BLOOD PRESSURE: 139 MMHG | DIASTOLIC BLOOD PRESSURE: 82 MMHG | OXYGEN SATURATION: 100 % | RESPIRATION RATE: 20 BRPM | HEIGHT: 72 IN | TEMPERATURE: 98.6 F | HEART RATE: 103 BPM | BODY MASS INDEX: 30.99 KG/M2 | WEIGHT: 228.8 LBS

## 2020-01-21 DIAGNOSIS — M79.18 MYOFASCIAL PAIN: ICD-10-CM

## 2020-01-21 DIAGNOSIS — M25.572 BILATERAL ANKLE PAIN, UNSPECIFIED CHRONICITY: ICD-10-CM

## 2020-01-21 DIAGNOSIS — M79.18 CHRONIC PRIMARY MUSCULOSKELETAL PAIN: Primary | ICD-10-CM

## 2020-01-21 DIAGNOSIS — M54.59 MECHANICAL LOW BACK PAIN: ICD-10-CM

## 2020-01-21 DIAGNOSIS — G89.29 CHRONIC PRIMARY MUSCULOSKELETAL PAIN: Primary | ICD-10-CM

## 2020-01-21 DIAGNOSIS — M25.571 BILATERAL ANKLE PAIN, UNSPECIFIED CHRONICITY: ICD-10-CM

## 2020-01-21 NOTE — PROGRESS NOTES
[address]  [facility] [facility] [address]  [place], [address] [place] [address] Street  Phone: [phone]  Fax: [phone]        Dacia Paz  : 1980  PCP: Akira Bustillo MD  2020    NEW PATIENT      HISTORY OF PRESENT ILLNESS  Mikel Nowak is a 42 y.o. male c/o chronic low back pain x 1 year. Pt reports episodic parascapular pain and BUE paraesthesia to the forearms. He denies radicular symptoms in the lower extremities. His pain is worse with standing and walking, especially over the last 6 weeks. He finds some benefit from rest. Pt previously found some benefit from muscle relaxants. 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 Pt denies change in bowel or bladder habits. Pt denies fever, weight loss, or skin changes. He rates his pain as an 8/10 today. ASSESSMENT  His pain is likely due to chronic primary musculoskeletal pain. We discussed options of: PT with dry needling, trigger point injections    PLAN  1. Referral to PT with dry needling ([address]). Pt will f/u in 6 weeks or sooner if needed. Diagnoses and all orders for this visit:    1. Chronic primary musculoskeletal pain  -     REFERRAL TO PHYSICAL THERAPY    2. Mechanical low back pain  -     REFERRAL TO PHYSICAL THERAPY    3. Myofascial pain  -     REFERRAL TO PHYSICAL THERAPY    4. Bilateral ankle pain, unspecified chronicity  -     REFERRAL TO PHYSICAL THERAPY             CHIEF COMPLAINT  Mikel Nowak is seen today in consultation at the request of Akira Bustillo MD for complaints of recurrent low back pain.       PAST MEDICAL HISTORY   Past Medical History:   Diagnosis Date    Depression     MD[facility]major [facility] disorder), recurrent severe[facility] 3/4/2018       Past Surgical History:   Procedure Laterality Date    COLONOSCOPY N/A 2017    COLONOSCOPY (Con-Sed) performed by Anabell Alanis [facility] at [facility] ENDOSCOPY    HX CHOLECYSTECTOMY      HX ORTHOPAEDIC      scope right knee       MEDICATIONS    Current Outpatient Medications   Medication Sig Dispense Refill    venlafaxine-SR (EFFEXOR-XR) 150 mg capsule Take 1 Cap by mouth daily (with breakfast). Indications: major depressive disorder 30 Cap 0    traZODone (DESYREL) 100 mg tablet Take 1 Tab by mouth nightly as needed for Sleep. Indications: insomnia associated with depression 30 Tab 0    fluticasone (FLONASE) 50 mcg/actuation nasal spray 2 Sprays by Both Nostrils route daily. Indications: Allergic Rhinitis 1 Bottle 0    ipratropium (ATROVENT) 0.03 % nasal spray 2 Sprays by Both Nostrils route two (2) times a day. Indications: PERENNIAL ALLERGIC RHINITIS 30 mL 0    loratadine (CLARITIN) 10 mg tablet Take 1 Tab by mouth daily. Indications: Allergic Rhinitis 30 Tab 0       ALLERGIES  No Known Allergies       SOCIAL HISTORY    Social History     Socioeconomic History    Marital status:      Spouse name: Not on file    Number of children: Not on file    Years of education: Not on file    Highest education level: Not on file   Tobacco Use    Smoking status: Never Smoker    Smokeless tobacco: Never Used   Substance and Sexual Activity    Alcohol use: No    Drug use: No       FAMILY HISTORY  Family History   Problem Relation Age of Onset    Other Mother         RHEM ARTHRITIS     Hypertension Mother     Diabetes Father          REVIEW OF SYSTEMS  Review of Systems   Musculoskeletal: Positive for back pain.         Episodic BUE paraesthesia         PHYSICAL EXAMINATION  Visit Vitals  /82   Pulse (!) 103   Temp 98.6 °F (37 °C)   Resp 20   Ht 6' (1.829 m)   Wt 228 lb 12.8 oz (103.8 kg)   SpO2 100%   BMI 31.03 kg/m²         Pain Assessment  1/21/2020   Location of Pain Back   Location Modifiers Inferior   Severity of Pain 8   Quality of Pain Throbbing   Quality of Pain Comment \"mild tingling in back & upper leg\"   Duration of Pain Persistent   Frequency of Pain Constant   Aggravating Factors -   Aggravating Factors Comment -   Limiting Behavior -   Relieving Factors -   Relieving Factors Comment -   Result of Injury No   Work-Related Injury -   How long out of work? -         Constitutional:  Well developed, well nourished, in no acute distress. Psychiatric: Affect and mood are appropriate. HEENT: Normocephalic, atraumatic. Extraocular movements intact. Integumentary: No rashes or abrasions noted on exposed areas. Cardiovascular: Regular rate and rhythm. Pulmonary: Clear to auscultation bilaterally. SPINE/MUSCULOSKELETAL EXAM    Cervical spine:  Neck is midline. Normal muscle tone. No focal atrophy is noted. ROM pain free. Shoulder ROM intact. No tenderness to palpation. Negative Spurling's sign. Negative Tinel's sign. Negative Martínez's sign. Sensation in the bilateral arms grossly intact to light touch. Lumbar spine:  No rash, ecchymosis, or gross obliquity. No fasciculations. No focal atrophy is noted. No pain with hip ROM. Full range of motion. Tenderness to palpation of lumbar paraspinals and right QL. No tenderness to palpation at the sciatic notch. SI joints non-tender. Trochanters non tender. Sensation in the bilateral legs grossly intact to light touch. Pain reproduced with stabilization of upper body. MOTOR:      Biceps  Triceps Deltoids Wrist Ext Wrist Flex Hand Intrin   Right 5/5 5/5 5/5 5/5 5/5 5/5   Left 5/5 5/5 5/5 5/5 5/5 5/5             Hip Flex  Quads Hamstrings Ankle DF EHL Ankle PF   Right 5/5 5/5 5/5 5/5 5/5 5/5   Left 5/5 5/5 5/5 5/5 5/5 5/5     DTRs are 1+ biceps, triceps, brachioradialis, patella, and Achilles. Negative Straight Leg raise. Squat not tested. No difficulty with tandem gait. Ambulation without assistive device. FWB.       RADIOGRAPHS  Lumbar XR images taken on 12/31/19 personally reviewed with patient:  VERTEBRAE AND ALIGNMENT: Segments are normal in height and alignment. DISC SPACES: Little or no significant disc space narrowing. PARASPINOUS SOFT TISSUES: Unremarkable. ADDITIONAL: None.    _______________    IMPRESSION    No significant osseous abnormality identified. Normal alignment.  reviewed    Mr. Norma Rosales has a reminder for a \"due or due soon\" health maintenance. I have asked that he contact his primary care provider for follow-up on this health maintenance. 24 minutes of face-to-face contact were spent with the patient during today's visit extensively discussing symptoms and treatment plan. All questions were answered. More than half of this visit today was spent on counseling. Written by Steff Montes, as dictated by Dr. Santillan. I, Dr. Santillan, confirm that all documentation is accurate.

## 2020-03-20 ENCOUNTER — TELEPHONE (OUTPATIENT)
Dept: ORTHOPEDIC SURGERY | Age: 40
End: 2020-03-20

## 2020-03-20 NOTE — TELEPHONE ENCOUNTER
This is a routine f/o pt w/ Dr. Clarissa Spicer that needs to re-schedule his 3/23 apt to 6/2020 due to recent COVID 19 pandemic

## 2020-05-08 ENCOUNTER — HOSPITAL ENCOUNTER (OUTPATIENT)
Age: 40
Discharge: HOME | End: 2020-05-08
Attending: PODIATRIST
Payer: MEDICAID

## 2020-05-08 DIAGNOSIS — S86.012A ACHILLES TENDON TEAR, LEFT, INITIAL ENCOUNTER: ICD-10-CM

## 2020-05-08 PROCEDURE — 73721 MRI JNT OF LWR EXTRE W/O DYE: CPT

## 2020-05-20 ENCOUNTER — HOSPITAL ENCOUNTER (INPATIENT)
Age: 40
LOS: 6 days | Discharge: HOME | DRG: 751 | End: 2020-05-27
Attending: EMERGENCY MEDICINE | Admitting: PSYCHIATRY & NEUROLOGY
Payer: MEDICAID

## 2020-05-20 DIAGNOSIS — R45.851 SUICIDAL IDEATIONS: Primary | ICD-10-CM

## 2020-05-20 DIAGNOSIS — F33.2 MDD (MAJOR DEPRESSIVE DISORDER), RECURRENT SEVERE, WITHOUT PSYCHOSIS (HCC): ICD-10-CM

## 2020-05-20 LAB
AMPHET UR QL SCN: NEGATIVE
ANION GAP SERPL CALC-SCNC: 5 MMOL/L (ref 3–18)
APPEARANCE UR: CLEAR
BARBITURATES UR QL SCN: NEGATIVE
BASOPHILS # BLD: 0 K/UL (ref 0–0.1)
BASOPHILS NFR BLD: 0 % (ref 0–2)
BENZODIAZ UR QL: NEGATIVE
BILIRUB UR QL: NEGATIVE
BUN SERPL-MCNC: 12 MG/DL (ref 7–18)
BUN/CREAT SERPL: 13 (ref 12–20)
CALCIUM SERPL-MCNC: 8.7 MG/DL (ref 8.5–10.1)
CANNABINOIDS UR QL SCN: NEGATIVE
CHLORIDE SERPL-SCNC: 111 MMOL/L (ref 100–111)
CO2 SERPL-SCNC: 27 MMOL/L (ref 21–32)
COCAINE UR QL SCN: NEGATIVE
COLOR UR: YELLOW
CREAT SERPL-MCNC: 0.93 MG/DL (ref 0.6–1.3)
DIFFERENTIAL METHOD BLD: ABNORMAL
EOSINOPHIL # BLD: 0.1 K/UL (ref 0–0.4)
EOSINOPHIL NFR BLD: 1 % (ref 0–5)
ERYTHROCYTE [DISTWIDTH] IN BLOOD BY AUTOMATED COUNT: 12.9 % (ref 11.6–14.5)
ETHANOL SERPL-MCNC: <3 MG/DL (ref 0–3)
GLUCOSE SERPL-MCNC: 107 MG/DL (ref 74–99)
GLUCOSE UR STRIP.AUTO-MCNC: NEGATIVE MG/DL
HCT VFR BLD AUTO: 47.3 % (ref 36–48)
HDSCOM,HDSCOM: NORMAL
HGB BLD-MCNC: 16 G/DL (ref 13–16)
HGB UR QL STRIP: NEGATIVE
KETONES UR QL STRIP.AUTO: ABNORMAL MG/DL
LEUKOCYTE ESTERASE UR QL STRIP.AUTO: NEGATIVE
LYMPHOCYTES # BLD: 2.1 K/UL (ref 0.9–3.6)
LYMPHOCYTES NFR BLD: 21 % (ref 21–52)
MCH RBC QN AUTO: 28.4 PG (ref 24–34)
MCHC RBC AUTO-ENTMCNC: 33.8 G/DL (ref 31–37)
MCV RBC AUTO: 84 FL (ref 74–97)
METHADONE UR QL: NEGATIVE
MONOCYTES # BLD: 0.7 K/UL (ref 0.05–1.2)
MONOCYTES NFR BLD: 6 % (ref 3–10)
MUCOUS THREADS URNS QL MICRO: ABNORMAL /LPF
NEUTS SEG # BLD: 7.4 K/UL (ref 1.8–8)
NEUTS SEG NFR BLD: 72 % (ref 40–73)
NITRITE UR QL STRIP.AUTO: NEGATIVE
OPIATES UR QL: NEGATIVE
PCP UR QL: NEGATIVE
PH UR STRIP: 6 [PH] (ref 5–8)
PLATELET # BLD AUTO: 307 K/UL (ref 135–420)
PMV BLD AUTO: 9.7 FL (ref 9.2–11.8)
POTASSIUM SERPL-SCNC: 3.6 MMOL/L (ref 3.5–5.5)
PROT UR STRIP-MCNC: ABNORMAL MG/DL
RBC # BLD AUTO: 5.63 M/UL (ref 4.7–5.5)
SODIUM SERPL-SCNC: 143 MMOL/L (ref 136–145)
SP GR UR REFRACTOMETRY: 1.03 (ref 1–1.03)
UROBILINOGEN UR QL STRIP.AUTO: 1 EU/DL (ref 0.2–1)
WBC # BLD AUTO: 10.2 K/UL (ref 4.6–13.2)
WBC URNS QL MICRO: ABNORMAL /HPF (ref 0–5)

## 2020-05-20 PROCEDURE — 80307 DRUG TEST PRSMV CHEM ANLYZR: CPT

## 2020-05-20 PROCEDURE — 85025 COMPLETE CBC W/AUTO DIFF WBC: CPT

## 2020-05-20 PROCEDURE — 81001 URINALYSIS AUTO W/SCOPE: CPT

## 2020-05-20 PROCEDURE — 80048 BASIC METABOLIC PNL TOTAL CA: CPT

## 2020-05-20 PROCEDURE — 99284 EMERGENCY DEPT VISIT MOD MDM: CPT

## 2020-05-20 RX ORDER — DICLOFENAC SODIUM 75 MG/1
75 TABLET, DELAYED RELEASE ORAL 2 TIMES DAILY
COMMUNITY
Start: 2020-04-15 | End: 2020-05-27

## 2020-05-20 RX ORDER — ZOLPIDEM TARTRATE 10 MG/1
10 TABLET ORAL DAILY
Status: ON HOLD | COMMUNITY
Start: 2020-02-11 | End: 2020-05-27 | Stop reason: SDUPTHER

## 2020-05-20 RX ORDER — ESCITALOPRAM OXALATE 20 MG/1
20 TABLET ORAL DAILY
Status: ON HOLD | COMMUNITY
End: 2020-05-27 | Stop reason: SDUPTHER

## 2020-05-21 PROBLEM — F32.A DEPRESSION: Status: ACTIVE | Noted: 2020-05-21

## 2020-05-21 PROBLEM — F41.1 GENERALIZED ANXIETY DISORDER: Status: ACTIVE | Noted: 2020-05-21

## 2020-05-21 PROBLEM — T43.291A BUPROPION OVERDOSE: Status: RESOLVED | Noted: 2018-03-04 | Resolved: 2020-05-21

## 2020-05-21 PROBLEM — T50.902A INTENTIONAL DRUG OVERDOSE (HCC): Status: RESOLVED | Noted: 2018-05-16 | Resolved: 2020-05-21

## 2020-05-21 PROBLEM — F32.A DEPRESSION: Status: RESOLVED | Noted: 2020-05-21 | Resolved: 2020-05-21

## 2020-05-21 LAB
COVID-19 RAPID TEST, COVR: NOT DETECTED
SOURCE, COVRS: NORMAL

## 2020-05-21 PROCEDURE — 87635 SARS-COV-2 COVID-19 AMP PRB: CPT

## 2020-05-21 PROCEDURE — 74011250637 HC RX REV CODE- 250/637: Performed by: PSYCHIATRY & NEUROLOGY

## 2020-05-21 PROCEDURE — 65220000003 HC RM SEMIPRIVATE PSYCH

## 2020-05-21 RX ORDER — IBUPROFEN 600 MG/1
600 TABLET ORAL
Status: DISCONTINUED | OUTPATIENT
Start: 2020-05-21 | End: 2020-05-27 | Stop reason: HOSPADM

## 2020-05-21 RX ORDER — ESCITALOPRAM OXALATE 20 MG/1
20 TABLET ORAL DAILY
Status: DISCONTINUED | OUTPATIENT
Start: 2020-05-22 | End: 2020-05-27 | Stop reason: HOSPADM

## 2020-05-21 RX ORDER — HYDROXYZINE PAMOATE 50 MG/1
50 CAPSULE ORAL
Status: DISCONTINUED | OUTPATIENT
Start: 2020-05-21 | End: 2020-05-27 | Stop reason: HOSPADM

## 2020-05-21 RX ORDER — TRAZODONE HYDROCHLORIDE 50 MG/1
50 TABLET ORAL
Status: DISCONTINUED | OUTPATIENT
Start: 2020-05-21 | End: 2020-05-23

## 2020-05-21 RX ORDER — ESCITALOPRAM OXALATE 10 MG/1
10 TABLET ORAL DAILY
Status: DISCONTINUED | OUTPATIENT
Start: 2020-05-21 | End: 2020-05-21

## 2020-05-21 RX ADMIN — ESCITALOPRAM OXALATE 10 MG: 10 TABLET ORAL at 08:34

## 2020-05-21 RX ADMIN — TRAZODONE HYDROCHLORIDE 50 MG: 50 TABLET ORAL at 20:24

## 2020-05-21 NOTE — ED NOTES
Pt has been resting comfortably and without distress. Pt has been calm and cooperative. Denies any needs at this time. Updated on plan of care. Admission pending. No safety concerns at this time.

## 2020-05-21 NOTE — GROUP NOTE
[facility]  GROUP DOCUMENTATION INDIVIDUAL Group Therapy Note Date: 5/21/2020 Group Start Time: 1300 Group End Time: 0136 Group Topic: Nursing [facility] ADULT CHEM Jodi Arteaga RN 
 
[facility]  GROUP DOCUMENTATION GROUP Group Therapy Note Attendees: 4 Attendance: Did not attend Teena Almeida RN

## 2020-05-21 NOTE — BSMART NOTE
[facility] Biopsychosocial Assessment Current Level of Psychosocial Functioning  
 
[x]Independent 
[]Dependent []Minimal Assist 
 
 
Comments:   
 
Psychosocial High Risk Factors (check all that apply) []Unable to obtain meds []Chronic illness/pain []Substance abuse  
[]Lack of Family Support []Financial stress []Isolation []Inadequate [employer] [x]Suicide attempt(s) []Not taking medications []Victim of crime []Developmental Delay 
[]Unable to manage personal needs []Age 72 or older  
[]  Homeless []Jordin transportation []Readmission within 30 days []Unemployment []Traumatic Event Psychiatric Advanced Directive: None Family to involve in treatment: patient reports no family to be involved with treatment. Sexual Orientation: Unknown at this time Patient Strengths: Patient is able to address concerns regarding his mental health. Patient Barriers: Patient reports he uses crutches due to an injury to his foot. Opiate education provided: N/A Safety plan: Patient is able to contract for safety at this current time. CMHC/MH history:  
 
Suicidal ideation R45.851  MDD (major depressive disorder), recurrent severe, without psychosis ([facility] [address] F33.2  Generalized anxiety disorder F41.1 Plan of Care: 
medication management, group/individual therapies, family meetings, psycho -education, treatment team meetings to assist with stabilization Initial Discharge Plan:  Patient reports he wishes to return to [facility] where he is seen by Dr. Sarai Rogers Clinical Summary:   
Patient is a 42year old male who presented to the emergency room with c/o \"I'm feeling depressed and suicidal.\" Patient denied a suicide plan.  Patient did not elaborate about the reasons/stressors related to feelings depressed and suicidal. Patient verbalized that he overdosed on pills 2 years, ago and he was admitted to [facility] for inpatient treatment. Patient denied thoughts of harm towards others. Patient voiced that he has been \"homeless for about 1 week. \" 
  
Patient also stated that he uses a cane and crutches to assist with ambulation. Patient said that he is anticipating reconstructive surgery to both ankles in about 1 month. Patient verbalized that he also wears an \"orthopedic boot to left foot d/t falling while walking his dog. \" 
 
Patient is observed in bed. Patient is withdrawn and expressed no major concerns at this time. Patient reports he is tired and would like to continue interview at a later time. SW will continue to monitor and will assist as needed.  
 
Suzanna Solorio

## 2020-05-21 NOTE — BSMART NOTE
Comprehensive Assessment Form Disposition Discussed with Pili Stroud and patient, the plan is admit. The on-call Psychiatrist consulted was Dr. Taylor Uriostegui. The admitting Diagnosis is Depression. Admitted to room 124-1 Unit: ADCD Integrated Summary Patient is a 42year old male who presented to the emergency room with c/o \"I'm feeling depressed and suicidal.\" Patient denied a suicide plan. Patient did not elaborate about the reasons/stressors related to feelings depressed and suicidal. Patient verbalized that he overdosed on pills 2 years, ago and he was admitted to [facility] for inpatient treatment. Patient denied thoughts of harm towards others. Patient voiced that he has been \"homeless for about 1 week. \" 
 
Patient also stated that he uses a cane and crutches to assist with ambulation. Patient said that he is anticipating reconstructive surgery to both ankles in about 1 month. Patient verbalized that he also wears an \"orthopedic boot to left foot d/t falling while walking his dog. \" 
 
Mental Status Exam 
 
The patient's appearance is unkempt. The patient's behavior calm, cooperative. The patient is oriented to time, place, person and situation. The patient's speech shows no evidence of impairment. The patient's mood is depressed and is sad. The range of affect is flat. The patient's thought content demonstrates no evidence of impairment. The thought process shows no evidence of impairment. The patient's perception shows no evidence of impairment. The patient's memory shows no evidence of impairment. The patient's appetite is decreased, \"1 meal/day, because I don't have money on me. \" The patient's sleep has evidence of insomnia, \"sleep 4-6 hours, sleep is interrupted sleep. \" Outpatient: \"[facility], Dr. Carrie Sheehan, last appt, couple months, ago; Ariane Omer, therapist, last appt, 5/15/2020\" Inpatient: \"[facility], 2018\" Medications: \"Lexapro, Ambien, Ativan\" DME: Straight cane, crutches Legal Issues: Denied Access to weapons: Denied Substance Abuse The patient is not using substances. The patient denied use of illicit drugs or alcoholic beverages; UDS-negative, BAL < 3. Patient is receptive to voluntary admission at [facility]; discussed with on-call psychiatrist, admission orders received, and report called to charge nurse. Rapid COVID test results-negative Sumit Santos, RN, BSN

## 2020-05-21 NOTE — ED PROVIDER NOTES
EMERGENCY DEPARTMENT HISTORY AND PHYSICAL EXAM    Date: 5/20/2020  Patient Name: Dre Singleton    History of Presenting Illness     Chief Complaint   Patient presents with    Suicidal         History Provided By:patient     Chief Complaint: suicidal thoughts  Duration: 1 week  Timing:  acute  Location: n/a  Quality:n/a  Severity: severe  Modifying Factors: lexapro helped, out of this medication x 1 month  Associated Symptoms: depressed mood      Additional History (Context): Dre Singleton is a 42 y.o. male with PMH depression, who presents with c/o suicidal thoughts and increased depression over the past week. Patient states he has been out of his Lexapro for 1 month. Denies any plans at present. Denies alcohol or drug abuse. No other complaints reported at this time. PCP: Donna Milligan MD    Current Outpatient Medications   Medication Sig Dispense Refill    diclofenac EC (VOLTAREN) 75 mg EC tablet Take 75 mg by mouth two (2) times a day.  zolpidem (Ambien) 10 mg tablet Take 10 mg by mouth daily.  escitalopram oxalate (LEXAPRO) 20 mg tablet Take 20 mg by mouth daily.  loratadine (CLARITIN) 10 mg tablet Take 1 Tab by mouth daily. Indications:  Allergic Rhinitis 30 Tab 0       Past History     Past Medical History:  Past Medical History:   Diagnosis Date    Ankle deformity     Bilateral    Depression     [facility]    Osteoarthritis     both knees       Past Surgical History:  Past Surgical History:   Procedure Laterality Date    COLONOSCOPY N/A 12/13/2017    COLONOSCOPY (Con-Sed) performed by J Carlos Clarke MD at [facility] ENDOSCOPY    HX CHOLECYSTECTOMY      HX ORTHOPAEDIC      scope right knee       Family History:  Family History   Problem Relation Age of Onset    Other Mother         RHEM ARTHRITIS     Hypertension Mother     Diabetes Father        Social History:  Social History Tobacco Use    Smoking status: Never Smoker    Smokeless tobacco: Never Used   Substance Use Topics    Alcohol use: No    Drug use: No       Allergies:  No Known Allergies      Review of Systems   Review of Systems   Constitutional: Negative. Negative for chills and fever. HENT: Negative. Negative for congestion, ear pain and rhinorrhea. Eyes: Negative. Negative for pain and redness. Respiratory: Negative. Negative for cough, shortness of breath, wheezing and stridor. Cardiovascular: Negative. Negative for chest pain and leg swelling. Gastrointestinal: Negative. Negative for abdominal pain, constipation, diarrhea, nausea and vomiting. Genitourinary: Negative. Negative for dysuria and frequency. Musculoskeletal: Negative. Negative for back pain and neck pain. Skin: Negative. Negative for rash and wound. Neurological: Negative. Negative for dizziness, seizures, syncope and headaches. Psychiatric/Behavioral: Positive for suicidal ideas. Depressed mood   All other systems reviewed and are negative. All Other Systems Negative  Physical Exam     Vitals:    05/20/20 2028   BP: 153/88   Pulse: 96   Resp: 16   Temp: 98.5 °F (36.9 °C)   SpO2: 98%   Weight: 99.8 kg (220 lb)   Height: 6' (1.829 m)     Physical Exam  Vitals signs and nursing note reviewed. Constitutional:       General: He is not in acute distress. Appearance: He is well-developed. He is not diaphoretic. HENT:      Head: Normocephalic and atraumatic. Eyes:      General: No scleral icterus. Right eye: No discharge. Left eye: No discharge. Conjunctiva/sclera: Conjunctivae normal.   Neck:      Musculoskeletal: Normal range of motion and neck supple. Cardiovascular:      Rate and Rhythm: Normal rate and regular rhythm. Heart sounds: Normal heart sounds. No murmur. No friction rub. No gallop. Pulmonary:      Effort: Pulmonary effort is normal. No respiratory distress. Breath sounds: Normal breath sounds. No stridor. No wheezing or rales. Musculoskeletal: Normal range of motion. Skin:     General: Skin is warm and dry. Findings: No erythema or rash. Neurological:      Mental Status: He is alert and oriented to person, place, and time. Coordination: Coordination normal.      Comments: Gait is steady and patient exhibits no evidence of ataxia. Patient is able to ambulate without difficulty. No focal neurological deficit noted. No facial droop, slurred speech, or evidence of altered mentation noted on exam.     Psychiatric:      Comments: Bizarre affect, admits to suicidal thoughts without plans, otherwise pleasant.                  Diagnostic Study Results     Labs -     Recent Results (from the past 12 hour(s))   URINALYSIS W/ RFLX MICROSCOPIC    Collection Time: 05/20/20  9:00 PM   Result Value Ref Range    Color YELLOW      Appearance CLEAR      Specific gravity 1.030 1.005 - 1.030      pH (UA) 6.0 5.0 - 8.0      Protein TRACE (A) NEG mg/dL    Glucose Negative NEG mg/dL    Ketone TRACE (A) NEG mg/dL    Bilirubin Negative NEG      Blood Negative NEG      Urobilinogen 1.0 0.2 - 1.0 EU/dL    Nitrites Negative NEG      Leukocyte Esterase Negative NEG     DRUG SCREEN, URINE    Collection Time: 05/20/20  9:00 PM   Result Value Ref Range    BENZODIAZEPINES Negative NEG      BARBITURATES Negative NEG      THC (TH-CANNABINOL) Negative NEG      OPIATES Negative NEG      PCP(PHENCYCLIDINE) Negative NEG      COCAINE Negative NEG      AMPHETAMINES Negative NEG      METHADONE Negative NEG      HDSCOM (NOTE)    URINE MICROSCOPIC ONLY    Collection Time: 05/20/20  9:00 PM   Result Value Ref Range    WBC 0 to 3 0 - 5 /hpf    Mucus 1+ (A) NEG /lpf   CBC WITH AUTOMATED DIFF    Collection Time: 05/20/20  9:08 PM   Result Value Ref Range    WBC 10.2 4.6 - 13.2 K/uL    RBC 5.63 (H) 4.70 - 5.50 M/uL    HGB 16.0 13.0 - 16.0 g/dL    HCT 47.3 36.0 - 48.0 %    MCV 84.0 74.0 - 97.0 FL    MCH 28.4 24.0 - 34.0 PG    MCHC 33.8 31.0 - 37.0 g/dL    RDW 12.9 11.6 - 14.5 %    PLATELET 095 381 - 835 K/uL    MPV 9.7 9.2 - 11.8 FL    NEUTROPHILS 72 40 - 73 %    LYMPHOCYTES 21 21 - 52 %    MONOCYTES 6 3 - 10 %    EOSINOPHILS 1 0 - 5 %    BASOPHILS 0 0 - 2 %    ABS. NEUTROPHILS 7.4 1.8 - 8.0 K/UL    ABS. LYMPHOCYTES 2.1 0.9 - 3.6 K/UL    ABS. MONOCYTES 0.7 0.05 - 1.2 K/UL    ABS. EOSINOPHILS 0.1 0.0 - 0.4 K/UL    ABS. BASOPHILS 0.0 0.0 - 0.1 K/UL    DF AUTOMATED     METABOLIC PANEL, BASIC    Collection Time: 05/20/20  9:08 PM   Result Value Ref Range    Sodium 143 136 - 145 mmol/L    Potassium 3.6 3.5 - 5.5 mmol/L    Chloride 111 100 - 111 mmol/L    CO2 27 21 - 32 mmol/L    Anion gap 5 3.0 - 18 mmol/L    Glucose 107 (H) 74 - 99 mg/dL    BUN 12 7.0 - 18 MG/DL    Creatinine 0.93 0.6 - 1.3 MG/DL    BUN/Creatinine ratio 13 12 - 20      GFR est AA >60 >60 ml/min/1.73m2    GFR est non-AA >60 >60 ml/min/1.73m2    Calcium 8.7 8.5 - 10.1 MG/DL   ETHYL ALCOHOL    Collection Time: 05/20/20  9:08 PM   Result Value Ref Range    ALCOHOL(ETHYL),SERUM <3 0 - 3 MG/DL       Radiologic Studies -   No orders to display     CT Results  (Last 48 hours)    None        CXR Results  (Last 48 hours)    None            Medical Decision Making   I am the first provider for this patient. I reviewed the vital signs, available nursing notes, past medical history, past surgical history, family history and social history. Vital Signs-Reviewed the patient's vital signs. Records Reviewed: Odilia Henao PA-C     Procedures:  Procedures    Provider Notes (Medical Decision Making): Impression:  SI    UDS negative, alcohol negative. Labs essentially unremarkable. UA: trace ketones and trace protein. Pt has been medically cleared. Will plan to consult Crisis. 9:51 PM  Odilia Henao PA-C     10:05 PM Spoke with crisis worker Saleem Benites and discussed the pt. She agrees to evaluate the pt.  Odilia Henao PA-C     11:23 PM Pt has been evaluated by Crisis worker Jaziel Lind and is recommended for admission to the [facility] health unit. Will continue to closely monitor the pt while he is in the ED awaiting admission. Odilia Henao PA-C     MED RECONCILIATION:  No current facility-administered medications for this encounter. Current Outpatient Medications   Medication Sig    diclofenac EC (VOLTAREN) 75 mg EC tablet Take 75 mg by mouth two (2) times a day.  zolpidem (Ambien) 10 mg tablet Take 10 mg by mouth daily.  escitalopram oxalate (LEXAPRO) 20 mg tablet Take 20 mg by mouth daily.  loratadine (CLARITIN) 10 mg tablet Take 1 Tab by mouth daily. Indications: Allergic Rhinitis       Disposition:  admitted    DISCHARGE NOTE:       Follow-up Information    None         Current Discharge Medication List              Diagnosis     Clinical Impression:   1.  Suicidal ideations

## 2020-05-21 NOTE — ED TRIAGE NOTES
Patient ambulating with the assistance of crutches for an ankle deformity. He is here today for suicidal ideation he is on medication but has run out for about one month now patient reports that he does not have a plan but has a history of medication overdose two years ago he has a history major depressive disorder without psychosis.

## 2020-05-21 NOTE — BH NOTES
Pt is a new admission; arrived at approximately 0419. He appeared to have slept for 0 hours thus far. Pt attempted to conceal a cell phone, 2 portable battery packs, a cell phone wall port and 3 cell phone  cords in his black medical boot; found during contraband/safety check. All items were placed in locker 124-1 by this writer. Crisis supervisor secured his laptop, backpack power cords in the Crisis office. Will continue to monitor for safety.
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
## 2020-05-21 NOTE — H&P
Psychiatry History and Physical    Subjective:     Date of Evaluation:  5/21/2020    Reason for Referral:  Francine Valenzuela was referred to the examiners from [facility] for SI/DEPRESSION    History of Presenting Problem: 44y/o AA male in NAD well developed and nourished. Admitted to [facility] for Depression/SI. Denies SI/AH/VH/HI now. UDS-negative, Covid test -negative. Reports ran out out of his Lexapro 1 month ago. Medical problems: Depression, Insomnia and Bilateral ankle deformity. Scheduled to have reconstructive ankle surgery later this year. Has Ortho boot to left foot. Denies substance abuse. Patient Active Problem List    Diagnosis Date Noted    Depression 05/21/2020    Int[facility] drug overdose ([facility] 05/16/2018    Bupropion overdose 03/04/2018    Suicidal ideation 03/04/2018    MDD (major depressive disorder), recurrent severe, without psychosis ([facility] [address] 03/04/2018     Past Medical History:   Diagnosis Date    Ankle deformity     Bilateral    Depression     MDD (major depressive disorder), recurrent severe, without psychosis ([facility] 3/4/2018    Osteoarthritis     both knees     Past Surgical History:   Procedure Laterality Date    COLONOSCOPY N/A 12/13/2017    COLONOSCOPY (Con-Sed) performed by Asia Emery MD at [facility] ENDOSCOPY    HX CHOLECYSTECTOMY      HX ORTHOPAEDIC      scope right knee       Family History   Problem Relation Age of Onset    Other Mother         RHEM ARTHRITIS     Hypertension Mother     Diabetes Father       Social History     Tobacco Use    Smoking status: Never Smoker    Smokeless tobacco: Never Used   Substance Use Topics    Alcohol use: No     Prior to Admission medications    Medication Sig Start Date End Date Taking? Authorizing Provider   diclofenac EC (VOLTAREN) 75 mg EC tablet Take 75 mg by mouth two (2) times a day. 4/15/20   Other, MD Kavon   escitalopram oxalate (LEXAPRO) 20 mg tablet Take 20 mg by mouth daily.     Kavon Boston MD   zolpidem (Ambien) 10 mg tablet Take 10 mg by mouth daily. 2/11/20   Other, MD Kavon   loratadine (CLARITIN) 10 mg tablet Take 1 Tab by mouth daily. Indications:  Allergic Rhinitis 3/12/18   Erwin Lam MD     No Known Allergies     Review of Systems - History obtained from chart review and the patient  General ROS: negative  Psychological ROS: positive for - depression  Ophthalmic ROS: negative  ENT ROS: negative  Respiratory ROS: no cough, shortness of breath, or wheezing  Cardiovascular ROS: no chest pain or dyspnea on exertion  Gastrointestinal ROS: no abdominal pain, change in bowel habits, or black or bloody stools  Genito-Urinary ROS: no dysuria, trouble voiding, or hematuria  Musculoskeletal ROS: positive for - gait disturbance and has ortho boot to left foot for sprain  Neurological ROS: no TIA or stroke symptoms  Dermatological ROS: negative      Objective:     Patient Vitals for the past 8 hrs:   BP Temp Pulse Resp SpO2   05/21/20 0805 134/75 98.1 °F (36.7 °C) 91 18    05/21/20 0420 112/78 97.3 °F (36.3 °C) 78 17    05/21/20 0355 126/71 97.6 °F (36.4 °C) 83 14 96 %       Mental Status exam: WNL except for    Sensorium  oriented to time, place and person   Orientation person, place, time/date and situation   Relations cooperative   Eye Contact poor   Appearance:  age appropriate and within normal Limits   Motor Behavior:  gait unsteady   Speech:  normal volume   Vocabulary average   Thought Process: logical   Thought Content free of delusions and free of hallucinations   Suicidal ideations no plan  and no intention   Homicidal ideations no plan  and no intention   Mood:  depressed   Affect:  depressed   Memory recent  adequate   Memory remote:  adequate   Concentration:  adequate   Abstraction:  concrete   Insight:  fair   Reliability fair   Judgment:  fair         Physical Exam:   Visit Vitals  /75 (BP 1 Location: Right arm, BP Patient Position: Sitting)   Pulse 91   Temp 98.1 °F (36.7 °C)   Resp 18   Ht 6' (1.829 m) Wt 99.8 kg (220 lb)   SpO2 96%   BMI 29.84 kg/m²     General appearance: alert, cooperative, no distress, appears stated age  Head: Normocephalic, without obvious abnormality, atraumatic  Eyes: negative  Ears: normal TM's and external ear canals AU  Nose: Nares normal. Septum midline. Mucosa normal. No drainage or sinus tenderness. Throat: Lips, mucosa, and tongue normal. Teeth and gums normal  Neck: supple, symmetrical, trachea midline, no adenopathy, thyroid: not enlarged, symmetric, no tenderness/mass/nodules, no carotid bruit and no JVD  Back: symmetric, no curvature. ROM normal. No CVA tenderness. Lungs: clear to auscultation bilaterally  Chest wall: no tenderness  Heart: regular rate and rhythm, S1, S2 normal, no murmur, click, rub or gallop  Abdomen: soft, non-tender. Bowel sounds normal. No masses,  no organomegaly  Extremities: extremities normal, atraumatic, no cyanosis or edema  Pulses: 2+ and symmetric  Skin: Skin color, texture, turgor normal. No rashes or lesions  Lymph nodes: Cervical, supraclavicular, and axillary nodes normal.  Neurologic: Grossly normal        Impression:      Active Problems:    Depression (5/21/2020)          Plan:     Recommendations for Treatment/Conditions:  Psychiatric treatment recommended while in hospital  Admit to [facility] -Dr Bassem Beltrán service    Referral To: Inpatient psychiatric care    Competency Statement: At the current time, the patient is competent to make informed consent regarding their current medical care and discharge planning and/or financial decisions.       [employer], [place]   5/21/2020 11:52 AM

## 2020-05-21 NOTE — BH NOTES
YESI NOTE: Patient was quiet majority of the shift and slept majority of the shift as well. Patient was verbal about being stressed out about finding a job as well as being terminated from previous employer. Patient had no falls or slips.
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
## 2020-05-22 PROCEDURE — 65220000003 HC RM SEMIPRIVATE PSYCH

## 2020-05-22 PROCEDURE — 74011250637 HC RX REV CODE- 250/637: Performed by: PSYCHIATRY & NEUROLOGY

## 2020-05-22 RX ADMIN — ESCITALOPRAM OXALATE 20 MG: 20 TABLET ORAL at 08:27

## 2020-05-22 NOTE — PROGRESS NOTES
Patient has been out of room socializing with peers and playing table games. Noted laughing and talkative. Moving about unit with wheelchair. States he has a \"lot of issues to work out\". Reports he is feeling some better. Contracts for safety on unit. Will continue to monitor.

## 2020-05-22 NOTE — GROUP NOTE
OMAYRA  GROUP DOCUMENTATION INDIVIDUAL Group Therapy Note Date: 5/22/2020 Group Start Time: 1265 Group End Time: 1400 Group Topic: Nursing [facility] 1 ADULT CHEM Jodi Arteaga RN 
 
OMAY[facility] CHAUHAN GROUP DOCUMENTATION GROUP Group Therapy Note Attendees: 1 Attendance: Did not attend Paolo Husain RN

## 2020-05-22 NOTE — BSMART NOTE
ART THERAPY GROUP PROGRESS NOTE Group time: 8:45 The patient did not awaken/get up when called for group despite encouragement.

## 2020-05-22 NOTE — PROGRESS NOTES
Psychiatric Progress Note    Patient: Chris Paul MRN: [id number]  SSN: [id number]    YOB: 1980  Age: 42 y.o. Sex: male      Admit Date: 5/20/2020    LOS: 1 day     Subjective:     Chris Paul  Reports modest improvement in mood. He has been isolative to his room. He was encouraged to attend groups. He reports normal sleep and appetite. He denies any side effects to medications. He appears mildly dysphoric on interview. No new complaints today. Objective:     Vitals:    05/21/20 0805 05/21/20 1555 05/21/20 1935 05/22/20 0801   BP: 134/75  106/61 112/78   Pulse: 91  69 80   Resp: 18  18 16   Temp: 98.1 °F (36.7 °C) 97.1 °F (36.2 °C)  97.5 °F (36.4 °C)   SpO2:       Weight:       Height:            Mental Status Exam:     Level of alertness: alert  Orientation: awake, alert, oriented X4  Appearance: appropriate  Mood: anxious, depressed  Affect: constricted  Psychomotor state: somewhat anxious  Attitude: open  SI/HI: denies intent or plan today  Sleep concerns: sleep cont. Disturbance (attributable to fire drill)  Speech: normal rate & rhythm  Language: logical, goal directed  Thought processes: coherent & goal directed  Associations: intact  Thought content: no delusions elicited  Hallucinations: denies  Attention: adequate  Concentration: adequate  Intellect: normal  Fund of knowledge: good  Insight/Judgment: insight intact, judgment intact  Musculoskeletal: normal inspection      MEDICATIONS:  Current Facility-Administered Medications   Medication Dose Route Frequency    traZODone (DESYREL) tablet 50 mg  50 mg Oral QHS PRN    hydrOXYzine pamoate (VISTARIL) capsule 50 mg  50 mg Oral TID PRN    escitalopram oxalate (LEXAPRO) tablet 20 mg  20 mg Oral DAILY    ibuprofen (MOTRIN) tablet 600 mg  600 mg Oral Q6H PRN          Lab/Data Review: All lab results for the last 24 hours reviewed.          Assessment:     Principal Problem:    MDD (major depressive disorder), recurrent severe, without psychosis ([facility] [address] (3/4/2018)    Active Problems:    Suicidal ideation (3/4/2018)      Generalized anxiety disorder (5/21/2020)        Plan:     Continue current medications  Encouraged to attend groups  Disposition planning with social work    Signed By: Sheyla Hess MD     May 22, 2020

## 2020-05-22 NOTE — BH NOTES
Patient spend most of the shift in his room but did come out for dinner and spend a little bit of time watching the news before going back to his room. Patient did come out later in the evening during snack time where he had snack and watched a movie. Patient stayed to himself did not interact with any of his peers while in the milieu. Patient made use of his wheelchair to get around the unit, did not need any staff assistance doing so. Patient also received some clothing from a friend who dropped it off.  Staff will continue to monitor patient's behavior

## 2020-05-22 NOTE — GROUP NOTE
[facility] GROUP DOCUMENTATION INDIVIDUAL Group Therapy Note Date: 5/21/2020 Group Start Time: 1 Group End Time: 1945 Group Topic: Nursing SO [facility] 1 ADULT CHEM DEP Bonnie Chung RN 
 
[facility] GROUP DOCUMENTATION GROUP Group Therapy Note Attendees: 7 Attendance: Attended Patient's Goal:  Reflection and medication education Interventions/techniques: Informed and Provide feedback Follows Directions: Followed directions Interactions: Interacted appropriately Mental Status: Calm Behavior/appearance: Cooperative Goals Achieved: Able to reflect/comment on own behavior and Able to receive feedback Additional Notes:  Patient verbalized understanding of medication available. \"I will wait until later for the sleeping medication. \"  PRN medication provided per patient request. See MAR.  
 
Fred Bishop RN

## 2020-05-22 NOTE — BSMART NOTE
OCCUPATIONAL THERAPY PROGRESS NOTE Group Time:  1400 Attendance: The patient attended full group. Participation: The patient participated fully in the activity. Attention: The patient was able to focus on the activity. Interaction: The patient occasionally  interacts with others. Discussing his current situation, states he has friends he can possibly go stay with-this seems not to be specific plan. Acknowledged a lot of stress recently and participated in discussion on stress management.

## 2020-05-22 NOTE — BH NOTES
BALDO NOTE : TODAY LAKEISHA STARTED OFF SLEEPING FOR MAJORITY OF THE SHIFT. AFTER LUNCH OANH CAME STAYED IN THE DAYROOM AND DID A GREAT AMOUNT OF CONVERSING WITH EVERYONE. A GREAT AMOUNT OF CONVERSATION WAS ABOUT GAINING EMPLOYMENT AND CASUAL CONVERSATION. HE ALSO STAYED TO WATCH \"THE PRICE IS RIGHT\". HE SEEMED TO BE A MUCH BETTER MOOD THAN YESTERDAY AND EXTREMELY SOCIABLE. TOWARD THE END OF THE SHIFT OANH RETURNED TO THE ROOM AND LAID DOWN QUIETLY, STAFF ENCOURAGED OANH TO PARTICIPATE MORE IN GROUP AND TO REMAIN OUTSIDE OF THE BED MORE, HE SAID HE WOULD WORK ON DOING SO. HE HAD NO FALLS OR SLIPS. STAFF WILL CONTINUE TO MONITOR, MOTIVATE, AND ENCOURAGE OANH TO SUCCESSFULLY COMPLETE TREATMENT.

## 2020-05-22 NOTE — BSMART NOTE
History:  Patient is a 42year old male who presented to the emergency room with c/o \"I'm feeling depressed and suicidal.\" Patient denied a suicide plan. Patient did not elaborate about the reasons/stressors related to feelings depressed and suicidal. Patient verbalized that he overdosed on pills 2 years, ago and he was admitted to [facility] for inpatient treatment. Patient denied thoughts of harm towards others. Patient voiced that he has been \"homeless for about 1 week. \" 
  
Patient is observed isolated in his room. Patient reports his stressors began when he disciplined his son for misbehaving. Patient reports his parents became angry and wanted him to seek anger management prior to his return home. Patient reports he has friends he can stay with upon discharge until he is able to figure out his next step. Patients thoughts are concrete and he is able to process problems. SW encouraged patient to engage in group therapy to address thoughts and feelings. SW will continue to assist patient as needed to complete safe and effective discharge.  
 
Avi Cook, ANNE MARIE-E

## 2020-05-23 PROCEDURE — 65220000003 HC RM SEMIPRIVATE PSYCH

## 2020-05-23 PROCEDURE — 74011250637 HC RX REV CODE- 250/637: Performed by: PSYCHIATRY & NEUROLOGY

## 2020-05-23 RX ORDER — ZOLPIDEM TARTRATE 5 MG/1
5 TABLET ORAL
Status: DISCONTINUED | OUTPATIENT
Start: 2020-05-23 | End: 2020-05-27 | Stop reason: HOSPADM

## 2020-05-23 RX ADMIN — ESCITALOPRAM OXALATE 20 MG: 20 TABLET ORAL at 09:28

## 2020-05-23 RX ADMIN — ZOLPIDEM TARTRATE 5 MG: 5 TABLET ORAL at 21:01

## 2020-05-23 NOTE — GROUP NOTE
[facility]  GROUP DOCUMENTATION INDIVIDUAL Group Therapy Note Date: 5/23/2020 Group Start Time: 6458 Group End Time: 1900 Group Topic: Nursing [facility] SPECIAL TRTMT 1 Shana Chua RN 
 
[facility] GROUP DOCUMENTATION GROUP Group Therapy Note Attendees: 6 Attendance: Attended Interventions/techniques: Informed Follows Directions: Followed directions Interactions: Interacted appropriately Mental Status: Calm Behavior/appearance: Cooperative Goals Achieved: Able to engage in interactions and Able to listen to others Colette Blue RN

## 2020-05-23 NOTE — PROGRESS NOTES
Problem: Suicide  Goal: *STG: Remains safe in hospital  Description: AEB pt not harming self or others and kia for safety daily while in the hospital.   Outcome: Progressing Towards Goal  Goal: *STG: Seeks staff when feelings of self harm or harm towards others arise  Description: AEB pt seeking staff as needed when feelings of self harm or harm towards others arise daily while in the hospital.   Outcome: Progressing Towards Goal  Goal: *STG/LTG: Complies with medication therapy  Description: AEB pt taking all scheduled medications daily while in the hospital.   Outcome: Progressing Towards Goal    Pt denies SI, intent, or plan. Pt alsode hallucinations of all types. Pt is calm and cooperative. Pt states, \"I don't need anything at all, just a hot date. \" Pt has been withdrawn to room resting for most of the day. Pt is medication compliant. Will continue to monitor.

## 2020-05-23 NOTE — GROUP NOTE
[facility]  GROUP DOCUMENTATION INDIVIDUAL Group Therapy Note Date: 5/22/2020 Group Start Time: 2100 Group End Time: 2145 Group Topic: Nursing [address] 1 ADULT CHEM DEP Rosana Tracy, RN 
 
[facility] GROUP DOCUMENTATION GROUP Group Therapy Note Attendees: 4 Attendance: Attended Interventions/techniques: Informed Follows Directions: Followed directions Interactions: Interacted appropriately Mental Status: Calm Behavior/appearance: Attentive and Cooperative Goals Achieved: Able to engage in interactions and Able to listen to others Rob Goddard

## 2020-05-23 NOTE — PROGRESS NOTES
[address]  Inpatient Progress Note     Date of Service: 05/23/20  Hospital Day: 2     Subjective/Interval History   05/23/20    Treatment Team Notes:  Notes reviewed and/or discussed and report that Hobert Goltz is a 43 yo male   with a history of major depressive disorder admitted for suicidal ideations. Patient interview: Hobert Goltz was interviewed by this writer today. Patient reported mild improvement in depression and some regression in SI. He requested to discontinue Trazodone and start Ambien to help with sleep. Objective     Visit Vitals  /80 (BP 1 Location: Left arm, BP Patient Position: At rest)   Pulse 80   Temp 98.6 °F (37 °C)   Resp 20   Ht 6' (1.829 m)   Wt 99.8 kg (220 lb)   SpO2 96%   BMI 29.84 kg/m²       No results found for this or any previous visit (from the past 24 hour(s)).     Mental Status Examination     Appearance/Hygiene 42 y.o. OTHER male  Hygiene: Reasonable   Behavior/Social Relatedness Appropriate   Musculoskeletal Gait/Station: appropriate  Tone (flaccid, cogwheeling, spastic): not assessed  Psychomotor (hyperkinetic, hypokinetic): calm   Involuntary movements (tics, dyskinesias, akathisa, stereotypies): none   Speech   Rate, rhythm, volume, fluency and articulation are appropriate   Mood   depressed   Affect    Blunted   Thought Process Linear and goal directed   Thought Content and Perceptual Disturbances Denies self-injurious behavior (SIB), suicidal ideation (SI), aggressive behavior or homicidal ideation (HI)    Denies auditory and visual hallucinations   Sensorium and Cognition  Grossly intact   Insight  limited   Judgment limited        Assessment/Plan      Psychiatric Diagnoses:   Patient Active Problem List   Diagnosis Code    Suicidal ideation R39.200    MDD (major depressive disorder), recurrent severe, without psychosis ([facility] [address] F33.2    Generalized anxiety disorder F41.1       Psychosocial and contextual factors: Family stressors, financial and work related stressors    Level of impairment/disability: Colleen Singleton is a 42 y.o. who is currently requiring acute stabilization after endorsing suicidal ideation. 1.  Continue current medication regimen unchanged to give it time to work. Discontinue Trazodone and start Ambien as requested by patient. 2.  Reviewed instructions, risks, benefits and side effects of medications  3.   Disposition/Discharge Date: TBD    Ctaina Silverio MD DR. [facility]  Psychiatry

## 2020-05-23 NOTE — GROUP NOTE
[facility] GROUP DOCUMENTATION INDIVIDUAL Group Therapy Note Date: 5/23/2020 Group Start Time: 7903 Group End Time: 3856 Group Topic: Nursing SO [facility] 1 ADULT CHEM FREDRICK Townsend  [facility] GROUP DOCUMENTATION GROUP Group Therapy Note Attendees: 6 Attendance: Attended Patient's Goal:  Emotions and How to Process Them Interventions/techniques: Informed Follows Directions: Followed directions Interactions: Interacted appropriately Mental Status: Calm Behavior/appearance: Cooperative Goals Achieved: Able to listen to others Anselmo Stanford

## 2020-05-24 PROCEDURE — 74011250637 HC RX REV CODE- 250/637: Performed by: PSYCHIATRY & NEUROLOGY

## 2020-05-24 PROCEDURE — 65220000003 HC RM SEMIPRIVATE PSYCH

## 2020-05-24 RX ADMIN — ESCITALOPRAM OXALATE 20 MG: 20 TABLET ORAL at 08:20

## 2020-05-24 RX ADMIN — ZOLPIDEM TARTRATE 5 MG: 5 TABLET ORAL at 21:04

## 2020-05-24 NOTE — PROGRESS NOTES
Problem: Suicide  Goal: *STG: Remains safe in hospital  Description: AEB pt not harming self or others and kia for safety daily while in the hospital.   Outcome: Progressing Towards Goal  Goal: *STG: Seeks staff when feelings of self harm or harm towards others arise  Description: AEB pt seeking staff as needed when feelings of self harm or harm towards others arise daily while in the hospital.   Outcome: Progressing Towards Goal  Goal: *STG/LTG: Complies with medication therapy  Description: AEB pt taking all scheduled medications daily while in the hospital.   Outcome: Progressing Towards Goal     Pt denies SI, intent, or plan. Pt also denies hallucinations of all types. Pt presents with bright affect and has been calm and cooperative in day area. Pt has been withdrawn to room resting for much of the afternoon, but does interact with peers in day area. Pt is medication compliant. Will continue to monitor.

## 2020-05-24 NOTE — BH NOTES
Pt has been cooperative when  in the milieu interacting with staff and peers. Pt spent most of the shift resting. Pt. denies  SI/Depression audio hallucination. At this time to this staff  pt denies harm to self or others. Pt contracts for safety on the unit. Pt.denies any new medical/pain complaints. Staff  provided positive feedback and support. St encouraged Pt. to participate  treatment plan. Pt agreed. Staff will continue to monitor Pt. for behavior safety and location.

## 2020-05-24 NOTE — GROUP NOTE
[facility]  GROUP DOCUMENTATION INDIVIDUAL Group Therapy Note Date: 5/24/2020 Group Start Time: 1300 Group End Time: 1070 Group Topic: Nursing SO [facility] 1 ADULT CHEM DEP Kiersten CUTLER  GROUP DOCUMENTATION GROUP Group Therapy Note Attendees: 2 Attendance: Did not attend Piper Harris

## 2020-05-24 NOTE — PROGRESS NOTES
[address]  Inpatient Progress Note     Date of Service: 05/24/20  Hospital Day: 3     Subjective/Interval History   05/24/20    Treatment Team Notes:  Notes reviewed and/or discussed and report that Rodrigo Hannah is a 43 yo male   with a history of major depressive disorder admitted for suicidal ideations. Patient interview: Rodrigo Hannah was interviewed by this writer today. Patient reported consistent improvement in depression and continued regression in SI. He slept better last night after starting Ambien. Objective     Visit Vitals  /80 (BP 1 Location: Left arm, BP Patient Position: At rest)   Pulse 87   Temp 97.9 °F (36.6 °C)   Resp 20   Ht 6' (1.829 m)   Wt 99.8 kg (220 lb)   SpO2 96%   BMI 29.84 kg/m²       No results found for this or any previous visit (from the past 24 hour(s)).     Mental Status Examination     Appearance/Hygiene 42 y.o. OTHER male  Hygiene: Reasonable   Behavior/Social Relatedness Appropriate   Musculoskeletal Gait/Station: appropriate  Tone (flaccid, cogwheeling, spastic): not assessed  Psychomotor (hyperkinetic, hypokinetic): calm   Involuntary movements (tics, dyskinesias, akathisa, stereotypies): none   Speech   Rate, rhythm, volume, fluency and articulation are appropriate   Mood   Getting better   Affect    stable   Thought Process Linear and goal directed   Thought Content and Perceptual Disturbances Denies self-injurious behavior (SIB), suicidal ideation (SI), aggressive behavior or homicidal ideation (HI)    Denies auditory and visual hallucinations   Sensorium and Cognition  Grossly intact   Insight  limited   Judgment limited        Assessment/Plan      Psychiatric Diagnoses:   Patient Active Problem List   Diagnosis Code    Suicidal ideation R39.200    MDD (major depressive disorder), recurrent severe, without psychosis ([facility] [address] F33.2    Generalized anxiety disorder F41.1       Psychosocial and contextual factors: Family stressors, financial and work related stressors    Level of impairment/disability: Moderate    Renard Lunsford is a 42 y.o. who is currently requiring acute stabilization after endorsing suicidal ideation. 1.  Continue current medication regimen unchanged given improvement in symptoms without side effects as reported by patient.   2.  Disposition/Discharge Date: 2-3 days    Franck Benitez MD DR. [facility]  Psychiatry

## 2020-05-24 NOTE — BH NOTES
Pt appeared to have slept for 6 hours thus far. He was observed shaking his foot during a couple of the Q 15 rounds. Pt was asked if he was doing okay and if he was having trouble sleeping. He verbalized that he was fine. Will continue to monitor for safety.

## 2020-05-25 PROCEDURE — 74011250637 HC RX REV CODE- 250/637: Performed by: PSYCHIATRY & NEUROLOGY

## 2020-05-25 PROCEDURE — 65220000003 HC RM SEMIPRIVATE PSYCH

## 2020-05-25 RX ADMIN — ESCITALOPRAM OXALATE 20 MG: 20 TABLET ORAL at 08:13

## 2020-05-25 RX ADMIN — ZOLPIDEM TARTRATE 5 MG: 5 TABLET ORAL at 21:00

## 2020-05-25 NOTE — BH NOTES
Pt has been withdrawn to room for most of the day. Pt is calm, cooperative, and pleasant. Pt denies SI, intent, or plan. Pt is medication compliant. Will continue to monitor.

## 2020-05-25 NOTE — PROGRESS NOTES
[address]  Inpatient Progress Note     Date of Service: 05/25/20  Hospital Day: 4     Subjective/Interval History   05/25/20    Treatment Team Notes:  Notes reviewed and/or discussed and report that Sandy Godwin is a pt with a hx of depression, tolerating and apparently responding to tx with escitalopram.      Patient interview: Sandy Godwin was interviewed by this writer today. The pt described the intensity of his depression as improving. Good meds tolerance described today. Objective     Visit Vitals  /80 (BP 1 Location: Left arm, BP Patient Position: Sitting)   Pulse 86   Temp 98 °F (36.7 °C)   Resp 18   Ht 6' (1.829 m)   Wt 99.8 kg (220 lb)   SpO2 96%   BMI 29.84 kg/m²     Vitals are stable. No results found for this or any previous visit (from the past 24 hour(s)). Mental Status Examination     Appearance/Hygiene 42 y.o. OTHER male  Hygiene: fair. Behavior/Social Relatedness Withdrawn. Musculoskeletal Gait/Station: appropriate  Tone (flaccid, cogwheeling, spastic): not assessed  Psychomotor (hyperkinetic, hypokinetic): calm   Involuntary movements (tics, dyskinesias, akathisa, stereotypies): none   Speech   Rate, rhythm, volume, fluency and articulation are appropriate   Mood   Less depressed. Affect    Flat. Thought Process Linear and goal directed   Thought Content and Perceptual Disturbances Denies self-injurious behavior (SIB), suicidal ideation (SI), still depressed, however. Denies psychotic symptoms. Cognition is appropriate. Sensorium and Cognition  Grossly intact   Insight  Fair. Judgment Fair. Assessment/Plan      Psychiatric Diagnoses:   Patient Active Problem List   Diagnosis Code    Suicidal ideation R39.200    MDD (major depressive disorder), recurrent severe, without psychosis ([facility] [address] F33.2    Generalized anxiety disorder F41.1       Medical Diagnoses: same. Psychosocial and contextual factors: same.     Level of impairment/disability: moderate. 1.  Depression is improving. No evidence of meds related side effects. 2.  Reviewed instructions, risks, benefits and side effects of medications  3. Disposition/Discharge Date: self-care/home.     Ashanti Lr MD, [address] [place] [address]

## 2020-05-26 PROCEDURE — 74011250637 HC RX REV CODE- 250/637: Performed by: PSYCHIATRY & NEUROLOGY

## 2020-05-26 PROCEDURE — 65220000003 HC RM SEMIPRIVATE PSYCH

## 2020-05-26 RX ADMIN — ZOLPIDEM TARTRATE 5 MG: 5 TABLET ORAL at 21:11

## 2020-05-26 RX ADMIN — ESCITALOPRAM OXALATE 20 MG: 20 TABLET ORAL at 08:09

## 2020-05-26 NOTE — BH NOTES
Patient was in his room appearing to be asleep upon Staff arrival on unit this morning. Patient verbally stated to Staff that he did Not have a good night and that he did Not sleep much at all due to the loudness of his roommates snoring. He said he was very sleepy and tired. Patient did cooperate and interacted well with Staff and Peers during shift today. He attended and participated in some Group Sessions today during shift. Patient did Not exhibit any behavioral problems/issues during shift today. Staff will continue to monitor Patient for safety, behavior, and location.

## 2020-05-26 NOTE — GROUP NOTE
[facility] GROUP DOCUMENTATION INDIVIDUAL Group Therapy Note Date: 5/26/2020 Group Start Time: 5550 Group End Time: 1430 Group Topic: Nursing SO [facility] 1 ADULT CHEM Jodi Arteaga RN 
 
[facility] GROUP DOCUMENTATION GROUP Group Therapy Note Attendees: 6 Attendance: Did not attend Krishna Drake RN

## 2020-05-26 NOTE — BSMART NOTE
History:  Patient is a 42year old male who presented to the emergency room with c/o \"I'm feeling depressed and suicidal.\" Patient denied a suicide plan. Patient did not elaborate about the reasons/stressors related to feelings depressed and suicidal. Patient verbalized that he overdosed on pills 2 years, ago and he was admitted to [facility] for inpatient treatment. Patient denied thoughts of harm towards others. Patient voiced that he has been \"homeless for about 1 week. \" 
 
SW made contact with patient as he engaged in the milieu. Patients affect is flat and calm. Patient is complaint with treatment and is able to address what he needs for success. Patient reports he may be able to return home however, suggests his parents have a way of setting him up for failure. SW encouraged patient to contact his parents to address his need for shelter until he is able to find him his own place. Patient reports he can return home and be around his children however, it must be supervised. Patient reports he has limited income and will have a job once he is discharged so this will assist with his finding his own place. SW will continue with supportive therapy and will assist as needed. Iqra Harris, YAMILW-E

## 2020-05-26 NOTE — BH NOTES
On 5/25/30 during the 3-11 pm shift, patient was back and froth between his room where he would either appear to be asleep, awake in the bed, or in the milieu, eating and watching TV. Patient socializes with peers. Patient awoke to eat dinner much later than it arrived. The writer has not observed any outbursts or rudeness from patient. The writer will continue to monitor patient to ensure patients safety.

## 2020-05-26 NOTE — PROGRESS NOTES
[address] [facility]  Inpatient Progress Note     Date of Service: 05/26/20  Hospital Day: 5     Subjective/Interval History   05/26/20    Treatment Team Notes:  Notes reviewed and/or discussed and report that Irma Loaiza is a pt with a hx of depression, tolerating and apparently responding to tx with escitalopram.    Patient interview: Irma Loaiza was interviewed by this writer today. The pt reports euthymic mood. Rates depression as 3 on a scale of 1-10 with 10 being severe and suicidal.  Obviously denies suicidal ideation at this time. He says he did not sleep well last night due to roommate's snoring. Main issue right now is homelessness. He has been tolerating Lexapro and feels that it is effective. Patient states he has to call some friends to find out if someone will be willing to accommodate him.  to help him obtain friend's phone numbers from his cell phone. Objective     Visit Vitals  /80 (BP 1 Location: Right arm, BP Patient Position: Sitting)   Pulse 82   Temp 98.5 °F (36.9 °C)   Resp 18   Ht 6' (1.829 m)   Wt 99.8 kg (220 lb)   SpO2 96%   BMI 29.84 kg/m²     Vitals are stable. No results found for this or any previous visit (from the past 24 hour(s)). Mental Status Examination     Appearance/Hygiene 42 y.o. OTHER male  Hygiene: fair. Behavior/Social Relatedness  appropriate   Musculoskeletal Gait/Station: appropriate  Tone (flaccid, cogwheeling, spastic): not assessed  Psychomotor (hyperkinetic, hypokinetic): calm   Involuntary movements (tics, dyskinesias, akathisa, stereotypies): none   Speech   Rate, rhythm, volume, fluency and articulation are appropriate   Mood   euthymic   Affect    congruent   Thought Process Linear and goal directed   Thought Content and Perceptual Disturbances Denies self-injurious behavior (SIB), suicidal ideation (SI), still depressed, however. Denies psychotic symptoms. Cognition is appropriate.    Sensorium and Cognition  Grossly intact   Insight  Fair. Judgment Fair. Assessment/Plan      Psychiatric Diagnoses:   Patient Active Problem List   Diagnosis Code    Suicidal ideation R39.200    MDD (major depressive disorder), recurrent severe, without psychosis ([facility] [address] F33.2    Generalized anxiety disorder F41.1       Medical Diagnoses: same. Psychosocial and contextual factors: same. Level of impairment/disability: moderate. 1.  Depression is improving. No evidence of meds related side effects. Continue Lexapro 20 mg and Ambien 5 mg at bedtime. 2.  Reviewed instructions, risks, benefits and side effects of medications  3. Disposition/Discharge Date: Discharge planning for tomorrow.     Mary Carranza MD [facility]. [facility] [place] [facility]\""  Psychiatry

## 2020-05-26 NOTE — BSMART NOTE
SOCIAL WORK GROUP THERAPY PROGRESS NOTE Group Time:  10:15am    
 
Group Topic:  Coping Skills    C D Issues Group Participation:   
 
Pt moderately involved during group discussion but remained attentive. Although only attended last x15 minutes, was able to explain ALL needing to sort out their strengths & weaknesses to support improving one's self esteem. \"Seven Steps\" for taking responsibility for our Happiness was reviewed including commitment to change, self-care, setting limits, goal setting & letting go. Admits he controls his future but struggles \"with process for change taking sooo long.

## 2020-05-26 NOTE — BSMART NOTE
OCCUPATIONAL THERAPY PROGRESS NOTE Group Time:  1430 Attendance: The patient attended full group. Participation: The patient participated fully in the activity. Tay Michel Attention: The patient was able to focus on the activity. Interaction: The patient occasionally  interacts with others. Discuss having no access to housing at this time encouraged to explore options with .

## 2020-05-27 VITALS
BODY MASS INDEX: 29.8 KG/M2 | RESPIRATION RATE: 18 BRPM | SYSTOLIC BLOOD PRESSURE: 149 MMHG | HEIGHT: 72 IN | WEIGHT: 220 LBS | DIASTOLIC BLOOD PRESSURE: 94 MMHG | TEMPERATURE: 99.6 F | HEART RATE: 81 BPM | OXYGEN SATURATION: 96 %

## 2020-05-27 PROCEDURE — 74011250637 HC RX REV CODE- 250/637: Performed by: PSYCHIATRY & NEUROLOGY

## 2020-05-27 RX ORDER — ZOLPIDEM TARTRATE 10 MG/1
10 TABLET ORAL DAILY
Qty: 14 TAB | Refills: 1 | Status: SHIPPED | OUTPATIENT
Start: 2020-05-27 | End: 2020-12-28

## 2020-05-27 RX ORDER — ESCITALOPRAM OXALATE 20 MG/1
20 TABLET ORAL DAILY
Qty: 30 TAB | Refills: 0 | Status: SHIPPED | OUTPATIENT
Start: 2020-05-27 | End: 2020-12-28

## 2020-05-27 RX ADMIN — ESCITALOPRAM OXALATE 20 MG: 20 TABLET ORAL at 08:32

## 2020-05-27 NOTE — BSMART NOTE
Pt.'s case was discussed in staffing this a.m. pt will be dc. TARYN contacted and fax information to [facility] for pt.'a aftercare appointment. TARYN left a message. TARYN contacted a staff at [facility] with hopes to a obtain the pt.'s appointment. SW waited to obtain the pt.'s appointment for more than 4 hours. SW was never contacted with appointment. TARYN provided pt with the following information: Medication Management staff will contact pt with appointment for Dr. Janette Mckinley and a  therapy appointment scheduled with [facility] [address] [place], [address] [place] [address]   [phone]. TARYN discussed dc with pt. Pt. plans to go to a shelter even though he can return home. Pt. Denies SI and HI . TARYN encouraged continued safety plan and medication compliance. Pt. Can contact warm line @ [phone].

## 2020-05-27 NOTE — GROUP NOTE
OMAYRA  GROUP DOCUMENTATION INDIVIDUAL Group Therapy Note Date: 5/26/2020 Group Start Time: [address] [facility] Group End Time: 1945 Group Topic: Nursing [facility] ADULT CHEM DEP Phong Morejon, RN 
 
[facility] GROUP DOCUMENTATION GROUP Group Therapy Note Attendees: 4 Attendance: Attended Interventions/techniques: Challenged and Informed Follows Directions: Followed directions Interactions: Interacted appropriately Mental Status: Calm Behavior/appearance: Attentive and Cooperative Goals Achieved: Able to engage in interactions and Able to listen to others Lennox Gasser. Harlin Reason

## 2020-05-27 NOTE — BH NOTES
Patient awaiting discharge for today. SW awaiting TC for appt follow-up. Once available discharge paperwork will be complete. Patient to most likely discharge on the evening shift. He has been highly engaging during this shift and no behavioral problems. Medication compliant. Will continue to monitor for safety and support.

## 2020-05-27 NOTE — DISCHARGE INSTRUCTIONS
BEHAVIORAL HEALTH NURSING DISCHARGE NOTE      The following personal items collected during your admission are returned to you:   Dental Appliance: Dental Appliances: None  Vision: Visual Aid: Glasses, At bedside  Hearing Aid:    Jewelry: Jewelry: None  Clothing: Clothing: Belt, Footwear, Jacket/Coat, Pants, Shirt, Socks, Undergarments  Other Valuables: Other Valuables: [employer] , Eyeglasses, Keys, Personal electronic devices (comment), Wallet, Other (comment)(Black boot-Medical)  Valuables sent to safe: Personal Items Sent to Safe: VISA-3008,VISA-3027,VISA-8990,AAA-9117,SS card, Passport,[facility]-6436      PATIENT INSTRUCTIONS:    Patient armband removed and shredded    The discharge information has been reviewed with the patient. The patient verbalized understanding.     Phone numbers to remember:  PETE [address]: [address] [facility] Emergency Services: [phone]  Suicide Prevention:  [phone]

## 2020-05-27 NOTE — BH NOTES
Patient discharged at this time with all belongings. Verbalizes understanding to discharge instructions. In good spirits at time of discharge. Escorted to main entrance by staff.

## 2020-05-27 NOTE — PROGRESS NOTES
Problem: Suicide  Goal: *STG: Remains safe in hospital  Description: AEB pt not harming self or others and kia for safety daily while in the hospital.   Outcome: Progressing Towards Goal     Problem: Suicide  Goal: *STG: Attends activities and groups  Description: AEB pt attending 3 groups daily while in the hospital   Outcome: Progressing Towards Goal     Problem: Suicide  Goal: *STG/LTG: Complies with medication therapy  Description: AEB pt taking all scheduled medications daily while in the hospital.   Outcome: Progressing Towards Goal   Patient visible on milieu. Using wheelchair to move about. Patient states he is preparing for discharge tomorrow. Reports having a bachelors degree in communication and states as long as he has [employer] available he can actively look for work. Denies SI/HI/AH. Compliant with medications. Will continue tomonitor and provide for safety.

## 2020-08-10 ENCOUNTER — HOSPITAL ENCOUNTER (OUTPATIENT)
Dept: PREADMISSION TESTING | Age: 40
Discharge: HOME | End: 2020-08-10
Payer: MEDICAID

## 2020-08-10 PROCEDURE — 87635 SARS-COV-2 COVID-19 AMP PRB: CPT

## 2020-08-11 LAB — SARS-COV-2, COV2NT: NOT DETECTED

## 2020-08-25 NOTE — ED PROVIDER NOTES
EMERGENCY DEPARTMENT HISTORY AND PHYSICAL EXAM    12:39 AM      Date: 3/3/2018  Patient Name: Martín Sims    History of Presenting Illness     Chief Complaint   Patient presents with    Suicidal          History Provided By: Patient    Chief Complaint: Suicidal  Duration:  Hours, around 11pm  Associated Symptoms: Lightheaded, abdominal pain, nausea      Additional History (Context): Martín Sims is a 30 y.o. male with h/o of Major Depressive  Disorder who presents for evaluation after consumption of 16 pills in an attempt to commit suicide. Patient states that around 11 pm yesterday, 03/03/2018, he took 10 Wellbutrin pills that belong to his sister. He thinks that the Wellbutrin pills were 150 mg, but he is unsure. Patient also reports that he took 6 Lexapro pills that were prescribed to him and were 20 mg. He indicates that he has previously tried to commit suicide by \"throwing himself into traffic. \" He notes that he was not injured during that incident. Patient also states that he has thought about overdosing on pills back in October 2017. He notes that he is feeling lightheaded, with nausea, and abdominal pain. He denies chest pain. Denies any further complaints or symptoms at the moment.         PCP: Iqra Baker MD    Current Facility-Administered Medications   Medication Dose Route Frequency Provider Last Rate Last Dose    acetaminophen (TYLENOL) tablet 650 mg  650 mg Oral Q6H PRN Cassidy Lemon MD        oxyCODONE-acetaminophen (PERCOCET) 5-325 mg per tablet 1 Tab  1 Tab Oral Q6H PRN Cassidy Lemon MD        [facility]) injection 0.4 mg  0.4 mg IntraVENous PRN Cassidy Lemon MD        ondansetron [facility]) injection 4 mg  4 mg IntraVENous Q6H PRN Cassidy Lemon MD        docusate sodium (COLACE) capsule 100 mg  100 mg Oral BID PRN Cassidy Lemon MD        0.9% sodium chloride infusion  150 mL/hr IntraVENous CONTINUOUS Cassidy Lemon  mL/hr at 03/04/18 0435 150 mL/hr at 03/04/18 0435    LORazepam (ATIVAN) injection 2 mg  2 mg IntraVENous Q10MIN PRN Uziel Noyola MD        potassium chloride (K-DUR, KLOR-CON) SR tablet 40 mEq  40 mEq Oral Q4H Uziel Noyola MD        LORazepam (ATIVAN) injection 1 mg  1 mg IntraVENous ONCE Uziel Noyola MD           Past History     Past Medical History:  Past Medical History:   Diagnosis Date    Depression        Past Surgical History:  Past Surgical History:   Procedure Laterality Date    COLONOSCOPY N/A 12/13/2017    COLONOSCOPY (Con-Sed) performed by Lorena Rey MD at [facility] ENDOSCOPY    HX CHOLECYSTECTOMY      HX ORTHOPAEDIC      scope right knee       Family History:  History reviewed. No pertinent family history. Social History:  Social History   Substance Use Topics    Smoking status: Never Smoker    Smokeless tobacco: Never Used    Alcohol use No       Allergies:  No Known Allergies      Review of Systems     Review of Systems   Constitutional: Negative for chills and fever. HENT: Negative for rhinorrhea. Respiratory: Negative for shortness of breath. Cardiovascular: Negative for chest pain. Gastrointestinal: Positive for abdominal pain and nausea. Negative for vomiting. Endocrine: Negative for polyuria. Genitourinary: Negative for dysuria. Musculoskeletal: Negative for back pain. Skin: Negative for rash. Neurological: Positive for light-headedness. Negative for headaches. Physical Exam     Visit Vitals    /74 (BP 1 Location: Left arm, BP Patient Position: At rest)    Pulse (!) 110    Temp 97.4 °F (36.3 °C)    Resp 18    SpO2 97%       Physical Exam   Constitutional: He is oriented to person, place, and time. He appears well-developed and well-nourished. Speaking in full sentences   HENT:   Head: Normocephalic and atraumatic. Eyes: Conjunctivae are normal. Pupils are equal, round, and reactive to light. Neck: Normal range of motion. Neck supple. Cardiovascular: Normal rate and regular rhythm. Pulmonary/Chest: Effort normal and breath sounds normal. No respiratory distress. He has no wheezes. Abdominal: Soft. Bowel sounds are normal. He exhibits no distension. There is no tenderness. There is no rebound and no guarding. Musculoskeletal: Normal range of motion. Neurological: He is alert and oriented to person, place, and time. Skin: Skin is warm and dry. Psychiatric: He has a normal mood and affect. He expresses suicidal ideation. He expresses no homicidal ideation. Nursing note and vitals reviewed.         Diagnostic Study Results     Labs -  Recent Results (from the past 12 hour(s))   METABOLIC PANEL, BASIC    Collection Time: 03/04/18 12:45 AM   Result Value Ref Range    Sodium 138 136 - 145 mmol/L    Potassium 3.1 (L) 3.5 - 5.5 mmol/L    Chloride 104 100 - 108 mmol/L    CO2 28 21 - 32 mmol/L    Anion gap 6 3.0 - 18 mmol/L    Glucose 96 74 - 99 mg/dL    BUN 16 7.0 - 18 MG/DL    Creatinine 0.98 0.6 - 1.3 MG/DL    BUN/Creatinine ratio 16 12 - 20      GFR est AA >60 >60 ml/min/1.73m2    GFR est non-AA >60 >60 ml/min/1.73m2    Calcium 9.1 8.5 - 97.5 MG/DL   SALICYLATE    Collection Time: 03/04/18 12:45 AM   Result Value Ref Range    Salicylate level <7.9 (L) 2.8 - 20.0 MG/DL   ACETAMINOPHEN    Collection Time: 03/04/18 12:45 AM   Result Value Ref Range    Acetaminophen level <2 (L) 10.0 - 30.0 ug/mL   ETHYL ALCOHOL    Collection Time: 03/04/18 12:45 AM   Result Value Ref Range    ALCOHOL(ETHYL),SERUM 3 0 - 3 MG/DL   CBC WITH AUTOMATED DIFF    Collection Time: 03/04/18 12:45 AM   Result Value Ref Range    WBC 10.2 4.6 - 13.2 K/uL    RBC 5.65 (H) 4.70 - 5.50 M/uL    HGB 16.2 (H) 13.0 - 16.0 g/dL    HCT 47.4 36.0 - 48.0 %    MCV 83.9 74.0 - 97.0 FL    MCH 28.7 24.0 - 34.0 PG    MCHC 34.2 31.0 - 37.0 g/dL    RDW 12.7 11.6 - 14.5 %    PLATELET 428 687 - 906 K/uL    MPV 9.5 9.2 - 11.8 FL    NEUTROPHILS 71 40 - 73 %    LYMPHOCYTES 21 21 - 52 % MONOCYTES 8 3 - 10 %    EOSINOPHILS 0 0 - 5 %    BASOPHILS 0 0 - 2 %    ABS. NEUTROPHILS 7.2 1.8 - 8.0 K/UL    ABS. LYMPHOCYTES 2.1 0.9 - 3.6 K/UL    ABS. MONOCYTES 0.9 0.05 - 1.2 K/UL    ABS. EOSINOPHILS 0.0 0.0 - 0.4 K/UL    ABS. BASOPHILS 0.0 0.0 - 0.1 K/UL    DF AUTOMATED     DRUG SCREEN, URINE    Collection Time: 03/04/18 12:50 AM   Result Value Ref Range    BENZODIAZEPINES NEGATIVE  NEG      BARBITURATES NEGATIVE  NEG      THC (TH-CANNABINOL) NEGATIVE  NEG      OPIATES NEGATIVE  NEG      PCP(PHENCYCLIDINE) NEGATIVE  NEG      COCAINE NEGATIVE  NEG      AMPHETAMINES NEGATIVE  NEG      METHADONE NEGATIVE  NEG      HDSCOM (NOTE)    EKG, 12 LEAD, INITIAL    Collection Time: 03/04/18  1:09 AM   Result Value Ref Range    Ventricular Rate 103 BPM    Atrial Rate 103 BPM    P-R Interval 142 ms    QRS Duration 92 ms    Q-T Interval 364 ms    QTC Calculation (Bezet) 476 ms    Calculated P Axis 61 degrees    Calculated R Axis -6 degrees    Calculated T Axis 53 degrees    Diagnosis       Sinus tachycardia  Otherwise normal ECG  No previous ECGs available         Radiologic Studies -   No orders to display         Medical Decision Making   I am the first provider for this patient. I reviewed the vital signs, available nursing notes, past medical history, past surgical history, family history and social history. Vital Signs-Reviewed the patient's vital signs. EKG: Interpreted by the EP. Time Interpreted: 1:09 AM   Rate: 103 bpm   Rhythm: Sinus Tachycardia    Interpretation: No STEMI    Records Reviewed: Nursing Notes (Time of Review: 12:39 AM)    ED Course: Progress Notes, Reevaluation, and Consults:    1:15 AM Discussed case with Poison Control. 1:38 AM Consult: I discussed care with Dr. Xiomara Kirk (Hospitalist). It was a standard discussion including patient history, chief complaint, available diagnostic results, and predicted treatment course. Will accept patient.     Provider Notes (Medical Decision Making): Discussed with poison control who recommended 24 hour observation since we do not know if wellbutrin was XL or what dosage it was. They recommended charcoal which was ordered. For Hospitalized Patients:    1. Hospitalization Decision Time:  The decision to hospitalize the patient was made by Dr. Kaitlynn Woodson 1:36 AM at on 3/4/2018    2. Aspirin: Aspirin was not given because the patient did not present with a stroke at the time of their Emergency Department evaluation    Diagnosis     Clinical Impression:   1. Bupropion overdose, intentional self-harm, initial encounter ([facility] [address]        Disposition: Admit  _______________________________    Attestations:  [address] acting as a scribe for and in the presence of Cheryl Bustos MD      March 04, 2018 at 12:39 AM    Scribe Attestation:     Farrah Messer, acting as a scribe for and in the presence of Cheryl Bustos MD      March 04, 2018 at 1:12 AM       Provider Attestation:      I personally performed the services described in the documentation, reviewed the documentation, as recorded by the scribe in my presence, and it accurately and completely records my words and actions.  March 04, 2018 at 12:39 AM - Cheryl Bustos MD    _______________________________ Hatchet Flap Text: The defect edges were debeveled with a #15 scalpel blade.  Given the location of the defect, shape of the defect and the proximity to free margins a hatchet flap was deemed most appropriate.  Using a sterile surgical marker, an appropriate hatchet flap was drawn incorporating the defect and placing the expected incisions within the relaxed skin tension lines where possible.    The area thus outlined was incised deep to adipose tissue with a #15 scalpel blade.  The skin margins were undermined to an appropriate distance in all directions utilizing iris scissors.

## 2020-12-18 ENCOUNTER — HOSPITAL ENCOUNTER (OUTPATIENT)
Dept: GENERAL RADIOLOGY | Age: 40
Discharge: HOME | End: 2020-12-18
Payer: MEDICAID

## 2020-12-18 ENCOUNTER — HOSPITAL ENCOUNTER (OUTPATIENT)
Dept: LAB | Age: 40
Discharge: HOME | End: 2020-12-18

## 2020-12-18 ENCOUNTER — TRANSCRIBE ORDER (OUTPATIENT)
Dept: REGISTRATION | Age: 40
End: 2020-12-18

## 2020-12-18 ENCOUNTER — HOSPITAL ENCOUNTER (OUTPATIENT)
Dept: PREADMISSION TESTING | Age: 40
Discharge: HOME | End: 2020-12-18
Payer: MEDICAID

## 2020-12-18 DIAGNOSIS — Z01.812 BLOOD TESTS PRIOR TO TREATMENT OR PROCEDURE: ICD-10-CM

## 2020-12-18 DIAGNOSIS — Z01.812 BLOOD TESTS PRIOR TO TREATMENT OR PROCEDURE: Primary | ICD-10-CM

## 2020-12-18 LAB
ATRIAL RATE: 91 BPM
CALCULATED P AXIS, ECG09: 42 DEGREES
CALCULATED R AXIS, ECG10: 12 DEGREES
CALCULATED T AXIS, ECG11: 41 DEGREES
DIAGNOSIS, 93000: NORMAL
P-R INTERVAL, ECG05: 138 MS
Q-T INTERVAL, ECG07: 352 MS
QRS DURATION, ECG06: 86 MS
QTC CALCULATION (BEZET), ECG08: 432 MS
VENTRICULAR RATE, ECG03: 91 BPM
XX-LABCORP SPECIMEN COL,LCBCF: NORMAL

## 2020-12-18 PROCEDURE — 71046 X-RAY EXAM CHEST 2 VIEWS: CPT

## 2020-12-18 PROCEDURE — 93005 ELECTROCARDIOGRAM TRACING: CPT

## 2020-12-18 PROCEDURE — 99001 SPECIMEN HANDLING PT-LAB: CPT

## 2020-12-24 ENCOUNTER — TRANSCRIBE ORDER (OUTPATIENT)
Dept: REGISTRATION | Age: 40
End: 2020-12-24

## 2020-12-24 ENCOUNTER — HOSPITAL ENCOUNTER (OUTPATIENT)
Dept: PREADMISSION TESTING | Age: 40
Discharge: HOME | End: 2020-12-24
Payer: MEDICAID

## 2020-12-24 DIAGNOSIS — Z01.812 PRE-PROCEDURE LAB EXAM: ICD-10-CM

## 2020-12-24 DIAGNOSIS — Z01.812 PRE-PROCEDURE LAB EXAM: Primary | ICD-10-CM

## 2020-12-24 PROCEDURE — 87635 SARS-COV-2 COVID-19 AMP PRB: CPT

## 2020-12-25 LAB — SARS-COV-2, COV2NT: NOT DETECTED

## 2020-12-28 ENCOUNTER — ANESTHESIA EVENT (OUTPATIENT)
Dept: SURGERY | Age: 40
End: 2020-12-28
Payer: MEDICAID

## 2020-12-28 RX ORDER — SERTRALINE HYDROCHLORIDE 50 MG/1
50 TABLET, FILM COATED ORAL DAILY
Status: ON HOLD | COMMUNITY
End: 2021-01-07 | Stop reason: SDUPTHER

## 2020-12-28 RX ORDER — DOCUSATE SODIUM 100 MG/1
100 CAPSULE, LIQUID FILLED ORAL DAILY
Status: ON HOLD | COMMUNITY
End: 2021-01-07 | Stop reason: SDUPTHER

## 2020-12-28 RX ORDER — DICYCLOMINE HYDROCHLORIDE 20 MG/1
20 TABLET ORAL EVERY 6 HOURS
COMMUNITY
End: 2021-01-10

## 2020-12-28 RX ORDER — L. ACIDOPHILUS/L.BULGARICUS 100MM CELL
1 GRANULES IN PACKET (EA) ORAL DAILY
COMMUNITY
End: 2021-01-10

## 2020-12-28 RX ORDER — POLYETHYLENE GLYCOL 3350 17 G/17G
17 POWDER, FOR SOLUTION ORAL DAILY
Status: ON HOLD | COMMUNITY
End: 2021-01-07 | Stop reason: SDUPTHER

## 2020-12-29 ENCOUNTER — APPOINTMENT (OUTPATIENT)
Dept: GENERAL RADIOLOGY | Age: 40
End: 2020-12-29
Attending: PODIATRIST
Payer: MEDICAID

## 2020-12-29 ENCOUNTER — ANESTHESIA (OUTPATIENT)
Dept: SURGERY | Age: 40
End: 2020-12-29
Payer: MEDICAID

## 2020-12-29 ENCOUNTER — HOSPITAL ENCOUNTER (OUTPATIENT)
Age: 40
Setting detail: OUTPATIENT SURGERY
Discharge: HOME | End: 2020-12-29
Attending: PODIATRIST | Admitting: PODIATRIST
Payer: MEDICAID

## 2020-12-29 VITALS
RESPIRATION RATE: 16 BRPM | DIASTOLIC BLOOD PRESSURE: 73 MMHG | TEMPERATURE: 98.6 F | HEIGHT: 72 IN | BODY MASS INDEX: 31.56 KG/M2 | HEART RATE: 101 BPM | SYSTOLIC BLOOD PRESSURE: 109 MMHG | OXYGEN SATURATION: 95 % | WEIGHT: 233 LBS

## 2020-12-29 DIAGNOSIS — L76.82 PAIN AT SURGICAL INCISION: Primary | ICD-10-CM

## 2020-12-29 PROCEDURE — 73620 X-RAY EXAM OF FOOT: CPT

## 2020-12-29 PROCEDURE — 74011250636 HC RX REV CODE- 250/636: Performed by: PODIATRIST

## 2020-12-29 PROCEDURE — 01480 ANES OPEN PX LOWER L/A/F NOS: CPT | Performed by: ANESTHESIOLOGY

## 2020-12-29 PROCEDURE — 77030013079 HC BLNKT BAIR HGGR 3M -A: Performed by: ANESTHESIOLOGY

## 2020-12-29 PROCEDURE — 74011250636 HC RX REV CODE- 250/636: Performed by: NURSE ANESTHETIST, CERTIFIED REGISTERED

## 2020-12-29 PROCEDURE — 74011250636 HC RX REV CODE- 250/636: Performed by: ANESTHESIOLOGY

## 2020-12-29 PROCEDURE — 77030040361 HC SLV COMPR DVT MDII -B: Performed by: PODIATRIST

## 2020-12-29 PROCEDURE — 76210000022 HC REC RM PH II 1.5 TO 2 HR: Performed by: PODIATRIST

## 2020-12-29 PROCEDURE — 77030031139 HC SUT VCRL2 J&J -A: Performed by: PODIATRIST

## 2020-12-29 PROCEDURE — C1769 GUIDE WIRE: HCPCS | Performed by: PODIATRIST

## 2020-12-29 PROCEDURE — 74011000250 HC RX REV CODE- 250: Performed by: PODIATRIST

## 2020-12-29 PROCEDURE — 2709999900 HC NON-CHARGEABLE SUPPLY: Performed by: PODIATRIST

## 2020-12-29 PROCEDURE — 76210000006 HC OR PH I REC 0.5 TO 1 HR: Performed by: PODIATRIST

## 2020-12-29 PROCEDURE — 64450 NJX AA&/STRD OTHER PN/BRANCH: CPT | Performed by: ANESTHESIOLOGY

## 2020-12-29 PROCEDURE — 77030002912 HC SUT ETHBND J&J -A: Performed by: PODIATRIST

## 2020-12-29 PROCEDURE — 76010000135 HC OR TIME 4 TO 4.5 HR: Performed by: PODIATRIST

## 2020-12-29 PROCEDURE — 74011000258 HC RX REV CODE- 258: Performed by: NURSE ANESTHETIST, CERTIFIED REGISTERED

## 2020-12-29 PROCEDURE — 77030025447 HC COUNTRSNK CANN DISP STRY -C: Performed by: PODIATRIST

## 2020-12-29 PROCEDURE — C1713 ANCHOR/SCREW BN/BN,TIS/BN: HCPCS | Performed by: PODIATRIST

## 2020-12-29 PROCEDURE — 77030020274 HC MISC IMPL ORTHOPEDIC: Performed by: PODIATRIST

## 2020-12-29 PROCEDURE — 74011000250 HC RX REV CODE- 250: Performed by: NURSE ANESTHETIST, CERTIFIED REGISTERED

## 2020-12-29 PROCEDURE — 77030041048: Performed by: PODIATRIST

## 2020-12-29 PROCEDURE — 76060000039 HC ANESTHESIA 4 TO 4.5 HR: Performed by: PODIATRIST

## 2020-12-29 PROCEDURE — 77030013179 HC SHOE PSTOP OPN DJOR -A: Performed by: PODIATRIST

## 2020-12-29 PROCEDURE — 77030003666 HC NDL SPINAL BD -A: Performed by: PODIATRIST

## 2020-12-29 PROCEDURE — 77030013480 HC CUF TRNQT J&J -B: Performed by: PODIATRIST

## 2020-12-29 PROCEDURE — 77030026438 HC STYL ET INTUB CARD -A: Performed by: ANESTHESIOLOGY

## 2020-12-29 PROCEDURE — 76942 ECHO GUIDE FOR BIOPSY: CPT | Performed by: ANESTHESIOLOGY

## 2020-12-29 PROCEDURE — 77030038212 HC GRFT ALLGRFT WDG STRY -I: Performed by: PODIATRIST

## 2020-12-29 PROCEDURE — 74011000272 HC RX REV CODE- 272: Performed by: PODIATRIST

## 2020-12-29 PROCEDURE — 77030008683 HC TU ET CUF COVD -A: Performed by: ANESTHESIOLOGY

## 2020-12-29 PROCEDURE — 77030040922 HC BLNKT HYPOTHRM STRY -A: Performed by: PODIATRIST

## 2020-12-29 PROCEDURE — 77030002916 HC SUT ETHLN J&J -A: Performed by: PODIATRIST

## 2020-12-29 PROCEDURE — 74011000250 HC RX REV CODE- 250: Performed by: ANESTHESIOLOGY

## 2020-12-29 PROCEDURE — 74011250637 HC RX REV CODE- 250/637: Performed by: NURSE ANESTHETIST, CERTIFIED REGISTERED

## 2020-12-29 PROCEDURE — 77030003899 HC BIT DRL TWST STRY -D: Performed by: PODIATRIST

## 2020-12-29 DEVICE — CANNULATED SCREW
Type: IMPLANTABLE DEVICE | Site: FOOT | Status: FUNCTIONAL
Brand: ASNIS

## 2020-12-29 DEVICE — GRAFT BNE SUB W6XH24XL14MM FT ANK PRESHAPED ALLGRFT FOR COT: Type: IMPLANTABLE DEVICE | Site: FOOT | Status: FUNCTIONAL

## 2020-12-29 DEVICE — OSTEOSYNTHESIS COMPRESSION STAPLE
Type: IMPLANTABLE DEVICE | Site: FOOT | Status: FUNCTIONAL
Brand: EASY CLIP

## 2020-12-29 DEVICE — XPRESS STAPLE (12 X 10 X 10MM)
Type: IMPLANTABLE DEVICE | Site: FOOT | Status: FUNCTIONAL
Brand: EASYCLIP

## 2020-12-29 RX ORDER — ONDANSETRON 2 MG/ML
4 INJECTION INTRAMUSCULAR; INTRAVENOUS ONCE
Status: DISCONTINUED | OUTPATIENT
Start: 2020-12-29 | End: 2020-12-29 | Stop reason: HOSPADM

## 2020-12-29 RX ORDER — KETAMINE HYDROCHLORIDE 100 MG/ML
INJECTION, SOLUTION INTRAMUSCULAR; INTRAVENOUS AS NEEDED
Status: DISCONTINUED | OUTPATIENT
Start: 2020-12-29 | End: 2020-12-29 | Stop reason: HOSPADM

## 2020-12-29 RX ORDER — MIDAZOLAM HYDROCHLORIDE 1 MG/ML
INJECTION, SOLUTION INTRAMUSCULAR; INTRAVENOUS
Status: COMPLETED | OUTPATIENT
Start: 2020-12-29 | End: 2020-12-29

## 2020-12-29 RX ORDER — ESMOLOL HYDROCHLORIDE 10 MG/ML
INJECTION INTRAVENOUS AS NEEDED
Status: DISCONTINUED | OUTPATIENT
Start: 2020-12-29 | End: 2020-12-29 | Stop reason: HOSPADM

## 2020-12-29 RX ORDER — CEFAZOLIN SODIUM 2 G/50ML
2 SOLUTION INTRAVENOUS ONCE
Status: COMPLETED | OUTPATIENT
Start: 2020-12-29 | End: 2020-12-29

## 2020-12-29 RX ORDER — HYDROMORPHONE HYDROCHLORIDE 2 MG/ML
INJECTION, SOLUTION INTRAMUSCULAR; INTRAVENOUS; SUBCUTANEOUS AS NEEDED
Status: DISCONTINUED | OUTPATIENT
Start: 2020-12-29 | End: 2020-12-29 | Stop reason: HOSPADM

## 2020-12-29 RX ORDER — HYDROMORPHONE HYDROCHLORIDE 2 MG/ML
0.5 INJECTION, SOLUTION INTRAMUSCULAR; INTRAVENOUS; SUBCUTANEOUS AS NEEDED
Status: DISCONTINUED | OUTPATIENT
Start: 2020-12-29 | End: 2020-12-29 | Stop reason: HOSPADM

## 2020-12-29 RX ORDER — SUCCINYLCHOLINE CHLORIDE 20 MG/ML
INJECTION INTRAMUSCULAR; INTRAVENOUS AS NEEDED
Status: DISCONTINUED | OUTPATIENT
Start: 2020-12-29 | End: 2020-12-29 | Stop reason: HOSPADM

## 2020-12-29 RX ORDER — SODIUM CHLORIDE 0.9 % (FLUSH) 0.9 %
5-40 SYRINGE (ML) INJECTION AS NEEDED
Status: DISCONTINUED | OUTPATIENT
Start: 2020-12-29 | End: 2020-12-29 | Stop reason: HOSPADM

## 2020-12-29 RX ORDER — PROPOFOL 10 MG/ML
INJECTION, EMULSION INTRAVENOUS AS NEEDED
Status: DISCONTINUED | OUTPATIENT
Start: 2020-12-29 | End: 2020-12-29 | Stop reason: HOSPADM

## 2020-12-29 RX ORDER — SODIUM CHLORIDE, SODIUM LACTATE, POTASSIUM CHLORIDE, CALCIUM CHLORIDE 600; 310; 30; 20 MG/100ML; MG/100ML; MG/100ML; MG/100ML
75 INJECTION, SOLUTION INTRAVENOUS CONTINUOUS
Status: DISCONTINUED | OUTPATIENT
Start: 2020-12-29 | End: 2020-12-29 | Stop reason: HOSPADM

## 2020-12-29 RX ORDER — GLYCOPYRROLATE 0.2 MG/ML
INJECTION INTRAMUSCULAR; INTRAVENOUS AS NEEDED
Status: DISCONTINUED | OUTPATIENT
Start: 2020-12-29 | End: 2020-12-29 | Stop reason: HOSPADM

## 2020-12-29 RX ORDER — DEXAMETHASONE SODIUM PHOSPHATE 4 MG/ML
4 INJECTION, SOLUTION INTRA-ARTICULAR; INTRALESIONAL; INTRAMUSCULAR; INTRAVENOUS; SOFT TISSUE ONCE
Status: COMPLETED | OUTPATIENT
Start: 2020-12-29 | End: 2020-12-29

## 2020-12-29 RX ORDER — SODIUM CHLORIDE 0.9 % (FLUSH) 0.9 %
5-40 SYRINGE (ML) INJECTION EVERY 8 HOURS
Status: DISCONTINUED | OUTPATIENT
Start: 2020-12-29 | End: 2020-12-29 | Stop reason: HOSPADM

## 2020-12-29 RX ORDER — DEXTROSE 50 % IN WATER (D50W) INTRAVENOUS SYRINGE
25-50 AS NEEDED
Status: DISCONTINUED | OUTPATIENT
Start: 2020-12-29 | End: 2020-12-29 | Stop reason: HOSPADM

## 2020-12-29 RX ORDER — MIDAZOLAM HYDROCHLORIDE 1 MG/ML
2 INJECTION, SOLUTION INTRAMUSCULAR; INTRAVENOUS ONCE
Status: COMPLETED | OUTPATIENT
Start: 2020-12-29 | End: 2020-12-29

## 2020-12-29 RX ORDER — FENTANYL CITRATE 50 UG/ML
INJECTION, SOLUTION INTRAMUSCULAR; INTRAVENOUS AS NEEDED
Status: DISCONTINUED | OUTPATIENT
Start: 2020-12-29 | End: 2020-12-29 | Stop reason: HOSPADM

## 2020-12-29 RX ORDER — LIDOCAINE HYDROCHLORIDE 20 MG/ML
INJECTION, SOLUTION EPIDURAL; INFILTRATION; INTRACAUDAL; PERINEURAL AS NEEDED
Status: DISCONTINUED | OUTPATIENT
Start: 2020-12-29 | End: 2020-12-29 | Stop reason: HOSPADM

## 2020-12-29 RX ORDER — ROPIVACAINE HYDROCHLORIDE 5 MG/ML
30 INJECTION, SOLUTION EPIDURAL; INFILTRATION; PERINEURAL
Status: COMPLETED | OUTPATIENT
Start: 2020-12-29 | End: 2020-12-29

## 2020-12-29 RX ORDER — DEXAMETHASONE SODIUM PHOSPHATE 4 MG/ML
INJECTION, SOLUTION INTRA-ARTICULAR; INTRALESIONAL; INTRAMUSCULAR; INTRAVENOUS; SOFT TISSUE
Status: COMPLETED | OUTPATIENT
Start: 2020-12-29 | End: 2020-12-29

## 2020-12-29 RX ORDER — ROPIVACAINE HYDROCHLORIDE 5 MG/ML
INJECTION, SOLUTION EPIDURAL; INFILTRATION; PERINEURAL
Status: COMPLETED | OUTPATIENT
Start: 2020-12-29 | End: 2020-12-29

## 2020-12-29 RX ORDER — LIDOCAINE HYDROCHLORIDE 10 MG/ML
2 INJECTION, SOLUTION EPIDURAL; INFILTRATION; INTRACAUDAL; PERINEURAL ONCE
Status: COMPLETED | OUTPATIENT
Start: 2020-12-29 | End: 2020-12-29

## 2020-12-29 RX ORDER — NEOSTIGMINE METHYLSULFATE 1 MG/ML
INJECTION, SOLUTION INTRAVENOUS AS NEEDED
Status: DISCONTINUED | OUTPATIENT
Start: 2020-12-29 | End: 2020-12-29 | Stop reason: HOSPADM

## 2020-12-29 RX ORDER — MAGNESIUM SULFATE 100 %
4 CRYSTALS MISCELLANEOUS AS NEEDED
Status: DISCONTINUED | OUTPATIENT
Start: 2020-12-29 | End: 2020-12-29 | Stop reason: HOSPADM

## 2020-12-29 RX ORDER — ONDANSETRON 2 MG/ML
INJECTION INTRAMUSCULAR; INTRAVENOUS AS NEEDED
Status: DISCONTINUED | OUTPATIENT
Start: 2020-12-29 | End: 2020-12-29 | Stop reason: HOSPADM

## 2020-12-29 RX ORDER — FENTANYL CITRATE 50 UG/ML
INJECTION, SOLUTION INTRAMUSCULAR; INTRAVENOUS
Status: COMPLETED | OUTPATIENT
Start: 2020-12-29 | End: 2020-12-29

## 2020-12-29 RX ORDER — MIDAZOLAM HYDROCHLORIDE 1 MG/ML
INJECTION, SOLUTION INTRAMUSCULAR; INTRAVENOUS AS NEEDED
Status: DISCONTINUED | OUTPATIENT
Start: 2020-12-29 | End: 2020-12-29 | Stop reason: HOSPADM

## 2020-12-29 RX ORDER — FENTANYL CITRATE 50 UG/ML
100 INJECTION, SOLUTION INTRAMUSCULAR; INTRAVENOUS ONCE
Status: COMPLETED | OUTPATIENT
Start: 2020-12-29 | End: 2020-12-29

## 2020-12-29 RX ORDER — ROCURONIUM BROMIDE 10 MG/ML
INJECTION, SOLUTION INTRAVENOUS AS NEEDED
Status: DISCONTINUED | OUTPATIENT
Start: 2020-12-29 | End: 2020-12-29 | Stop reason: HOSPADM

## 2020-12-29 RX ORDER — AMOXICILLIN AND CLAVULANATE POTASSIUM 875; 125 MG/1; MG/1
1 TABLET, FILM COATED ORAL 2 TIMES DAILY
Qty: 20 TAB | Refills: 0 | Status: SHIPPED | OUTPATIENT
Start: 2020-12-29 | End: 2021-01-10

## 2020-12-29 RX ORDER — LIDOCAINE HYDROCHLORIDE 10 MG/ML
0.1 INJECTION, SOLUTION EPIDURAL; INFILTRATION; INTRACAUDAL; PERINEURAL AS NEEDED
Status: DISCONTINUED | OUTPATIENT
Start: 2020-12-29 | End: 2020-12-29 | Stop reason: HOSPADM

## 2020-12-29 RX ORDER — OXYCODONE AND ACETAMINOPHEN 5; 325 MG/1; MG/1
1 TABLET ORAL
Qty: 21 TAB | Refills: 0 | Status: ON HOLD | OUTPATIENT
Start: 2020-12-29 | End: 2021-01-06

## 2020-12-29 RX ORDER — FAMOTIDINE 20 MG/1
20 TABLET, FILM COATED ORAL ONCE
Status: COMPLETED | OUTPATIENT
Start: 2020-12-29 | End: 2020-12-29

## 2020-12-29 RX ADMIN — CEFAZOLIN SODIUM 2 G: 2 SOLUTION INTRAVENOUS at 10:56

## 2020-12-29 RX ADMIN — PHENYLEPHRINE HYDROCHLORIDE 200 MCG: 10 INJECTION INTRAVENOUS at 13:50

## 2020-12-29 RX ADMIN — PROPOFOL 200 MG: 10 INJECTION, EMULSION INTRAVENOUS at 11:05

## 2020-12-29 RX ADMIN — MIDAZOLAM 2 MG: 1 INJECTION INTRAMUSCULAR; INTRAVENOUS at 10:04

## 2020-12-29 RX ADMIN — SODIUM CHLORIDE, SODIUM LACTATE, POTASSIUM CHLORIDE, AND CALCIUM CHLORIDE: 600; 310; 30; 20 INJECTION, SOLUTION INTRAVENOUS at 12:55

## 2020-12-29 RX ADMIN — SUCCINYLCHOLINE CHLORIDE 160 MG: 20 INJECTION, SOLUTION INTRAMUSCULAR; INTRAVENOUS at 11:05

## 2020-12-29 RX ADMIN — ROCURONIUM BROMIDE 10 MG: 50 INJECTION INTRAVENOUS at 13:59

## 2020-12-29 RX ADMIN — DEXMEDETOMIDINE HYDROCHLORIDE 10 MCG: 100 INJECTION, SOLUTION, CONCENTRATE INTRAVENOUS at 13:13

## 2020-12-29 RX ADMIN — ROCURONIUM BROMIDE 35 MG: 50 INJECTION INTRAVENOUS at 11:11

## 2020-12-29 RX ADMIN — DEXAMETHASONE SODIUM PHOSPHATE 8 MG: 4 INJECTION, SOLUTION INTRAMUSCULAR; INTRAVENOUS at 11:26

## 2020-12-29 RX ADMIN — MIDAZOLAM HYDROCHLORIDE 2 MG: 2 INJECTION, SOLUTION INTRAMUSCULAR; INTRAVENOUS at 11:05

## 2020-12-29 RX ADMIN — HYDROMORPHONE HYDROCHLORIDE 0.2 MG: 2 INJECTION, SOLUTION INTRAMUSCULAR; INTRAVENOUS; SUBCUTANEOUS at 12:46

## 2020-12-29 RX ADMIN — PHENYLEPHRINE HYDROCHLORIDE 200 MCG: 10 INJECTION INTRAVENOUS at 13:36

## 2020-12-29 RX ADMIN — DEXMEDETOMIDINE HYDROCHLORIDE 10 MCG: 100 INJECTION, SOLUTION, CONCENTRATE INTRAVENOUS at 12:58

## 2020-12-29 RX ADMIN — ROCURONIUM BROMIDE 5 MG: 50 INJECTION INTRAVENOUS at 11:05

## 2020-12-29 RX ADMIN — DEXMEDETOMIDINE HYDROCHLORIDE 10 MCG: 100 INJECTION, SOLUTION, CONCENTRATE INTRAVENOUS at 13:08

## 2020-12-29 RX ADMIN — MIDAZOLAM HYDROCHLORIDE 2 MG: 2 INJECTION, SOLUTION INTRAMUSCULAR; INTRAVENOUS at 10:20

## 2020-12-29 RX ADMIN — Medication 3 MG: at 14:52

## 2020-12-29 RX ADMIN — LIDOCAINE HYDROCHLORIDE 2 ML: 10 INJECTION, SOLUTION EPIDURAL; INFILTRATION; INTRACAUDAL; PERINEURAL at 10:06

## 2020-12-29 RX ADMIN — FAMOTIDINE 20 MG: 20 TABLET, FILM COATED ORAL at 09:26

## 2020-12-29 RX ADMIN — ROPIVACAINE HYDROCHLORIDE 150 MG: 5 INJECTION, SOLUTION EPIDURAL; INFILTRATION; PERINEURAL at 10:08

## 2020-12-29 RX ADMIN — ONDANSETRON 4 MG: 2 INJECTION INTRAMUSCULAR; INTRAVENOUS at 13:51

## 2020-12-29 RX ADMIN — HYDROMORPHONE HYDROCHLORIDE 0.4 MG: 2 INJECTION, SOLUTION INTRAMUSCULAR; INTRAVENOUS; SUBCUTANEOUS at 12:43

## 2020-12-29 RX ADMIN — PHENYLEPHRINE HYDROCHLORIDE 200 MCG: 10 INJECTION INTRAVENOUS at 13:47

## 2020-12-29 RX ADMIN — LIDOCAINE HYDROCHLORIDE 100 MG: 20 INJECTION, SOLUTION EPIDURAL; INFILTRATION; INTRACAUDAL; PERINEURAL at 11:05

## 2020-12-29 RX ADMIN — ROPIVACAINE HYDROCHLORIDE 30 ML: 5 INJECTION, SOLUTION EPIDURAL; INFILTRATION; PERINEURAL at 10:20

## 2020-12-29 RX ADMIN — DEXMEDETOMIDINE HYDROCHLORIDE 10 MCG: 100 INJECTION, SOLUTION, CONCENTRATE INTRAVENOUS at 13:05

## 2020-12-29 RX ADMIN — ESMOLOL HYDROCHLORIDE 30 MG: 10 INJECTION, SOLUTION INTRAVENOUS at 12:53

## 2020-12-29 RX ADMIN — FENTANYL CITRATE 100 MCG: 50 INJECTION, SOLUTION INTRAMUSCULAR; INTRAVENOUS at 10:04

## 2020-12-29 RX ADMIN — GLYCOPYRROLATE 0.6 MG: 0.2 INJECTION INTRAMUSCULAR; INTRAVENOUS at 14:52

## 2020-12-29 RX ADMIN — PHENYLEPHRINE HYDROCHLORIDE 200 MCG: 10 INJECTION INTRAVENOUS at 13:38

## 2020-12-29 RX ADMIN — SODIUM CHLORIDE, SODIUM LACTATE, POTASSIUM CHLORIDE, AND CALCIUM CHLORIDE 75 ML/HR: 600; 310; 30; 20 INJECTION, SOLUTION INTRAVENOUS at 09:26

## 2020-12-29 RX ADMIN — PHENYLEPHRINE HYDROCHLORIDE 200 MCG: 10 INJECTION INTRAVENOUS at 14:07

## 2020-12-29 RX ADMIN — FENTANYL CITRATE 100 MCG: 50 INJECTION, SOLUTION INTRAMUSCULAR; INTRAVENOUS at 10:20

## 2020-12-29 RX ADMIN — FENTANYL CITRATE 100 MCG: 50 INJECTION, SOLUTION INTRAMUSCULAR; INTRAVENOUS at 11:05

## 2020-12-29 RX ADMIN — HYDROMORPHONE HYDROCHLORIDE 0.4 MG: 2 INJECTION, SOLUTION INTRAMUSCULAR; INTRAVENOUS; SUBCUTANEOUS at 12:55

## 2020-12-29 RX ADMIN — DEXMEDETOMIDINE HYDROCHLORIDE 10 MCG: 100 INJECTION, SOLUTION, CONCENTRATE INTRAVENOUS at 13:02

## 2020-12-29 RX ADMIN — ESMOLOL HYDROCHLORIDE 30 MG: 10 INJECTION, SOLUTION INTRAVENOUS at 13:08

## 2020-12-29 RX ADMIN — GLYCOPYRROLATE 0.2 MG: 0.2 INJECTION INTRAMUSCULAR; INTRAVENOUS at 11:55

## 2020-12-29 RX ADMIN — DEXAMETHASONE SODIUM PHOSPHATE 4 MG: 4 INJECTION, SOLUTION INTRAMUSCULAR; INTRAVENOUS at 10:20

## 2020-12-29 RX ADMIN — DEXAMETHASONE SODIUM PHOSPHATE 4 MG: 4 INJECTION, SOLUTION INTRAMUSCULAR; INTRAVENOUS at 10:08

## 2020-12-29 RX ADMIN — ESMOLOL HYDROCHLORIDE 20 MG: 10 INJECTION, SOLUTION INTRAVENOUS at 13:23

## 2020-12-29 RX ADMIN — KETAMINE HYDROCHLORIDE 50 MG: 100 INJECTION, SOLUTION, CONCENTRATE INTRAMUSCULAR; INTRAVENOUS at 13:11

## 2020-12-29 RX ADMIN — PHENYLEPHRINE HYDROCHLORIDE 200 MCG: 10 INJECTION INTRAVENOUS at 14:23

## 2020-12-29 RX ADMIN — PHENYLEPHRINE HYDROCHLORIDE 100 MCG: 10 INJECTION INTRAVENOUS at 15:01

## 2020-12-29 RX ADMIN — CEFAZOLIN SODIUM 2 G: 2 SOLUTION INTRAVENOUS at 14:45

## 2020-12-29 RX ADMIN — PHENYLEPHRINE HYDROCHLORIDE 200 MCG: 10 INJECTION INTRAVENOUS at 14:37

## 2020-12-29 RX ADMIN — DEXMEDETOMIDINE HYDROCHLORIDE 10 MCG: 100 INJECTION, SOLUTION, CONCENTRATE INTRAVENOUS at 13:24

## 2020-12-29 NOTE — BRIEF OP NOTE
Brief Postoperative Note    Patient: Ron Pagan  YOB: 1980  MRN: [id number]    Date of Procedure: 12/29/2020     Pre-Op Diagnosis: LEFT FOOT FLEXIBLE PES PLANUS, HIND FOOT VALGUS DEFORMITY, FORE FOOT VARUS    Post-Op Diagnosis: Same as preoperative diagnosis. Procedure(s):  LEFT FOOT: CALCANEAL SLIDE OSTEOTOMY, DUMONT OSTEOTOMY, cottons procedure    Surgeon(s):  Hilton Grimes DPM    Surgical Assistant: Surg Asst-1: Duke Mcelroy    Anesthesia: General     Estimated Blood Loss (mL): 20 cc    Complications: None    Specimens: * No specimens in log *     Implants:   Implant Name Type Inv.  Item Serial No.  Lot No. LRB No. Used Action   SCREW BNE L50MM OD5MM STD TI ARTURO ST SELF DRL [place]KA - [id number]  SCREW BNE L50MM OD5MM STD TI ARTURO ST SELF DRL [employer] [facility] N/A Left 1 Implanted   SCREW BNE L46MM DIA5MM ARTURO TI ST SELF DRL [place] NONLOCKING - [id number]  SCREW BNE L46MM DIA5MM ARTURO TI ST SELF DRL [place] NONLOCKING  [employer] [facility] N/A Left 1 Implanted   strryker dumont wedge [id number]    JORJE SHAR [id number] Left 1 Implanted   STAPLE INT FIX [id number] FT ANK NIT SUP E LO - [id number]  STAPLE INT FIX [id number] FT ANK NIT SUP E LO  [employer]WD [id number] Left 1 Implanted   GRAFT BNE SUB [id number] FT ANK PRESHAPED ALLGRFT FOR COT - [id number]  GRAFT BNE SUB [id number] FT ANK PRESHAPED ALLGRFT FOR COT  [employer] [facility]_ [id number] Left 1 Implanted   STAPLE [id number] - [id number]  STAPLE [id number]  [employer]WD [id number] Left 1 Implanted       Drains: * No LDAs found *    Electronically Signed by Adan Evangelista DPM on 12/29/2020 at 3:40 PM

## 2020-12-29 NOTE — ANESTHESIA POSTPROCEDURE EVALUATION
Procedure(s):  LEFT FOOT: CALCANEAL SLIDE OSTEOTOMY, DUMONT OSTECTOMY, cottons procedure. general    Anesthesia Post Evaluation      Multimodal analgesia: multimodal analgesia used between 6 hours prior to anesthesia start to PACU discharge  Patient location during evaluation: bedside  Patient participation: complete - patient participated  Level of consciousness: awake  Pain score: 0  Pain management: adequate  Airway patency: patent  Anesthetic complications: no  Cardiovascular status: stable  Respiratory status: acceptable  Hydration status: acceptable  Post anesthesia nausea and vomiting:  controlled  Final Post Anesthesia Temperature Assessment:  Normothermia (36.0-37.5 degrees C)      INITIAL Post-op Vital signs:   Vitals Value Taken Time   /81 12/29/20 1655   Temp 37 °C (98.6 °F) 12/29/20 1625   Pulse 105 12/29/20 1714   Resp 15 12/29/20 1714   SpO2 97 % 12/29/20 1714   Vitals shown include unvalidated device data.

## 2020-12-29 NOTE — H&P
Update History & Physical    The Patient's History and Physical was reviewed with the patient and I examined the patient. There was no change. The surgical site was confirmed by the patient and me. Plan:  The risk, benefits, expected outcome, and alternative to the recommended procedure have been discussed with the patient. Patient understands and wants to proceed with the procedure.     Electronically signed by Monserrat Ojeda DPM on 12/29/2020 at 10:07 AM

## 2020-12-29 NOTE — ANESTHESIA PREPROCEDURE EVALUATION
Relevant Problems   No relevant active problems       Anesthetic History   No history of anesthetic complications            Review of Systems / Medical History  Patient summary reviewed, nursing notes reviewed and pertinent labs reviewed    Pulmonary  Within defined limits                 Neuro/Psych         Psychiatric history     Cardiovascular                  Exercise tolerance: >4 METS     GI/Hepatic/Renal                Endo/Other        Arthritis     Other Findings              Physical Exam    Airway  Mallampati: II  TM Distance: 4 - 6 cm  Neck ROM: normal range of motion   Mouth opening: Normal     Cardiovascular    Rhythm: regular  Rate: normal         Dental  No notable dental hx       Pulmonary  Breath sounds clear to auscultation               Abdominal  GI exam deferred       Other Findings            Anesthetic Plan    ASA: 2  Anesthesia type: general      Post-op pain plan if not by surgeon: peripheral nerve block single    Induction: Intravenous  Anesthetic plan and risks discussed with: Patient

## 2020-12-29 NOTE — PERIOP NOTES
Discharge instructions provided to patient's friend, Karel Song, who patient lives with. She states she is an RN. Instructions reviewed verbally with her with understanding verbalized. Will send crutches with patient and crutch instructions. [address] Patient dressed and waiting on ride. 1900 Friend not here yet. Called to make sure no miscommunication about . She is on her way.

## 2020-12-29 NOTE — DISCHARGE INSTRUCTIONS
DISCHARGE SUMMARY from Nurse    PATIENT INSTRUCTIONS:    After general anesthesia or intravenous sedation, for 24 hours or while taking prescription Narcotics:  · Limit your activities  · Do not drive and operate hazardous machinery  · Do not make important personal or business decisions  · Do  not drink alcoholic beverages  · If you have not urinated within 8 hours after discharge, please contact your surgeon on call. Report the following to your surgeon:  · Excessive pain, swelling, redness or odor of or around the surgical area  · Temperature over 100.5  · Nausea and vomiting lasting longer than 4 hours or if unable to take medications  · Any signs of decreased circulation or nerve impairment to extremity: change in color, persistent  numbness, tingling, coldness or increase pain  · Any questions    What to do at Home:  Recommended activity: as instructed by Dr. Sheyla Jones, no driving today or as long as on narcotic pain med. *  Please give a list of your current medications to your Primary Care Provider. *  Please update this list whenever your medications are discontinued, doses are      changed, or new medications (including over-the-counter products) are added. *  Please carry medication information at all times in case of emergency situations. These are general instructions for a healthy lifestyle:    No smoking/ No tobacco products/ Avoid exposure to second hand smoke  Surgeon General's Warning:  Quitting smoking now greatly reduces serious risk to your health.     Obesity, smoking, and sedentary lifestyle greatly increases your risk for illness    A healthy diet, regular physical exercise & weight monitoring are important for maintaining a healthy lifestyle    You may be retaining fluid if you have a history of heart failure or if you experience any of the following symptoms:  Weight gain of 3 pounds or more overnight or 5 pounds in a week, increased swelling in our hands or feet or shortness of breath while lying flat in bed. Please call your doctor as soon as you notice any of these symptoms; do not wait until your next office visit. The discharge information has been reviewed with the patient. The patient verbalized understanding. Discharge medications reviewed with the patient and appropriate educational materials and side effects teaching were provided. Instructions from Dr. Anali Patel:    Keep dressings dry, clean, intact. Remain NWB in posterior splint. Take percocet as needed for pain. Take and finish course of augmentin. Will call in [facility].    He will need to take it as DVT prophylaxis    ___________________________________________________________________________________________________________________________________

## 2021-01-01 NOTE — OP NOTES
Operative Report     Patient: Claude Stubbs MRN: [id number]  SSN: [id number]    YOB: 1980  Age: 47 y.o. Sex: male       Indications: The patient was seen in office for painful feet, left worse than right. Patient noted to have severe flat foot with hindfoot valgus deformity. Patient attempted conservative treatment consisting of orthotics, stretching, change in shoe gear. However, patient failed conservative treatment and opted to proceed with surgical intervention. All risks, benefits, complications of procedure discussed in detail with patient. No guarantee made to outcome of procedure. Possible complications discussed including but not limited to delayed union, non-union, non resolution of symptoms, infection, swelling, numbness, dislocation of graft, requirement of additional surgery. Patient voiced understanding and signed consent. Date of Surgery: 12/29/2020     Preoperative Diagnosis:  LEFT FOOT FLEXIBLE PES PLANUS, HIND FOOT VALGUS DEFORMITY, FORE FOOT VARUS    Postoperative Diagnosis:  LEFT FOOT FLEXIBLE PES PLANUS, HIND FOOT VALGUS DEFORMITY, FORE FOOT VARUS     Surgeon(s) and Role:     * Su Cody DPM - Primary      Surgical Assistants: Operating room staff    Anesthesia: General with popliteal block    Procedure:   LEFT FOOT: CALCANEAL SLIDE OSTEOTOMY, DUMONT OSTECTOMY, cottons procedure    Procedure in Detail:     The patient was brought to the operating room after having a popliteal block in preop holding area and placed on the operating room table in the supine position with bump on ipsilateral hip. The patient was then placed on the operating room table and the patient prepped and draped in the usual manner. A thigh tourniquet was placed around the thigh. After exsanguination of the leg, the following procedures were performed.     An incision was made on the lateral aspect of the calcaneus of the left foot, just inferior to lateral malleolus and at 45 degrees to weightbearing surface and was approximately 7 cm in length. Incision was deepened by sharp and blunt dissection down to the level of the calcaneus. Care was taken to avoid the sural nerve. The periosteum was incised with an incision paralleling the skin incision. The position of the anticipated osteotomy was checked with a FluoroScan and found to be triplanar and in excellent position. As soon as the osteotomy of the calcaneus was made at that level and the proximal osteotomized segment displaced both medially, temporary fixation was achieved with a cannulated wires to the posterior aspect of the heel into the proximal calcaneus. After performing countersink and measure appropriate length with depth gauge, cannulated 5.0 screws placed over wires. The wound was irrigated with normal saline solution. The periosteum over the osteotomy was closed utilizing 2-0 Vicryl. Subcutaneous tissue was placed in that position and maintained with several sutures of 3-0 Vicryl. Skin was then approximated with 3-0 prolene suture in simple interrupted technique. Next, attention was directed to the dorsal lateral aspect over the sinus tarsi and the calcaneocuboid joint. Incision made just distal to sinus tarsi and 1.5 cm proximal to CC joint. An oblique incision performed from distal dorsal to proximal plantar. The incision was deepened by blunt and sharp dissection, taking care to avoid the peroneal tendons and the sural nerve, which were reflected inferiorly. The muscle belly of the extensor digitorum brevis was dissected off the floor of the sinus tarsi. The tendon sheath of the peroneals were reflected inferiorly and the tendons were retracted along with the sural nerve plantarly and the muscle belly dorsally exposing the calcaneocuboid joint. An osteotomy was created 1.5 cm proximal to the calcaneocuboid joint. The osteotomy was a vertical osteotomy and was made to the medial cortex of the calcaneus.  The osteotomy was spread utilizing a bone distractor and a 8 mm wedge of bone of cortical allograft was inserted into the osteotomy site. After the insertion of this graft, it was found to reduce the uncoverage of the talonavicular joint and realign and reduce the deformity. The distractor was removed along with K-wires, and the wound was flushed with a solution of saline. Care was noted that the graft did not extrude dorsally or plantarly and was in proper placement. FluoroScan was utilized to check the position of the graft. Next, 20 mm staple placed across osteotomy site using standard technique. The periosteum over the graft and the calcaneus was closed utilizing 2-0 Vicryl. The subcutaneous tissue over the peroneal tendons was placed in that position and maintained with several interrupted sutures of 3-0 Vicryl. The skin was approximated with 3-0 prolene in simple interrupted technique. Next, attention was directed to the third procedure, Cotton osteotomy. The osteotomy was made into the dorsal medial aspect of the first cuneiform. An incision was made on the dorsal medial aspect of the foot beginning at the level of the NC joint and terminating distally over the base of the first metatarsal. The incision measured approximately 4 cm in length. The incision was deepened by blunt and sharp dissection, taking care to identify the anterior tibial tendon. The medial branch of the common peroneal nerve was protected along with the saphenous nerve. The anterior tibial tendon was retracted dorsally. The extensor tendon was on the dorsal aspect of the wound, not in the surgical site. Dissection was carried down to the body of the cuneiform, and the joints distal and proximal to it were identified as the navicular cuneiform joint and the first metatarsocuneiform joint. A guide pin was placed in the first cuneiform at the level of the osteotomy to be performed and checked with the FluoroScan, found to be midbody of the cuneiform.  A transverse osteotomy was created and opened utilizing a bone distractor with two 0.062 K-wires. At this point, a 6 mm wedge of allograft was placed into the wedge after being modified and found to plantar flex and reduce the deformity of metatarsus primus elevatus and the navicular cuneiform folds. Ensured the osteotomy did not protrude above the level of the bone and was quite secure once the distractor and pins were removed. 12 mm staple placed across osteotomy dorsally with standard technique. The wound was irrigated with solution normal saline. The position of the graft was checked with fluoroscopy and found to be in adequate and in excellent position. The periosteum over the first cuneiform was placed in that position and maintained with simple interrupted sutures of 2-0 Vicryl. The subcutaneous tissue over the anterior tibial tendon was placed in that position and maintained with  3-0 Vicryl. The skin was approximated with 3-0 prolene using simple interrupted technique. Each one of the wounds was dressed with Betadine dressing and an Adaptic. A large fluffy, bulky dressing was applied along with a Coban dressing. A posterior splint was applied to the leg once the tourniquet was removed, and the digital color returned immediately. The posterior splint was applied with the foot at 90 degrees as far as dorsiflexion is concerned. The patient tolerated the operative procedure well and left the operating room in stable condition. Estimated Blood Loss:  Minimal    Drains: none           Specimens: * No specimens in log *            Implants:    Implant Name Type Inv.  Item Serial No.  Lot No. LRB No. Used Action   SCREW BNE L50MM OD5MM STD TI ARTURO ST SELF DRL [employer] - [id number]  SCREW BNE L50MM OD5MM STD TI ARTURO ST SELF DRL [employer] HOW_WD N/A Left 1 Implanted   SCREW BNE L46MM DIA5MM ARTURO TI ST SELF DRL [employer] NONLOCKING - [id number]  SCREW BNE L46MM DIA5MM ARTURO TI ST SELF DRL [employer] NONLOCKING [employer] [facility]_ N/A Left 1 Implanted   GRAFT HUM TISS [id number] [facility] - [id number]     JORJE SHAR [id number] Left 1 Implanted   STAPLE INT FIX [id number] FT ANK NIT SUP E LO - [id number]  STAPLE INT FIX [id number] FT ANK NIT SUP E LO  [employer]_ [id number] Left 1 Implanted   GRAFT BNE SUB [id number] FT ANK PRESHAPED ALLGRFT FOR COT - [id number]  GRAFT BNE SUB [id number] FT ANK PRESHAPED ALLGRFT FOR COT  [employer] [facility]_ [id number] Left 1 Implanted   STAPLE [id number] - [id number]  STAPLE [id number]  [employer]_ [id number] Left 1 Implanted              Complications:  None

## 2021-01-02 PROBLEM — R06.02 SOB (SHORTNESS OF BREATH): Status: ACTIVE | Noted: 2021-01-02

## 2021-01-02 PROBLEM — R07.9 CHEST PAIN: Status: ACTIVE | Noted: 2021-01-02

## 2021-01-02 PROBLEM — J18.9 PNEUMONIA: Status: ACTIVE | Noted: 2021-01-02

## 2021-12-21 PROBLEM — K62.5 RECTAL BLEEDING: Status: ACTIVE | Noted: 2021-12-21

## 2021-12-21 PROBLEM — K64.4 EXTERNAL HEMORRHOIDS: Status: ACTIVE | Noted: 2021-12-21

## 2022-06-14 NOTE — PROGRESS NOTES
Problem: Suicide  Goal: *STG: Remains safe in hospital  Description: AEB pt not harming self or others and kia for safety daily while in the hospital.   Outcome: Progressing Towards Goal  Note: Patient will remains safe in the hospital daily  Goal: *STG: Attends activities and groups  Description: AEB pt attending 3 groups daily while in the hospital   Outcome: Progressing Towards Goal  Note: Patient will attend scheduled activities and groups daily  Goal: *STG/LTG: Complies with medication therapy  Description: AEB pt taking all scheduled medications daily while in the hospital.   Outcome: Progressing Towards Goal  Note: Patient will comply with medication therapy daily    Patient has been minimally visible in the day area during this shift. He has been medication compliant, following unit protocol, and engaging with peers/ staff while in [address]. He denies current thoughts of self harm, but states he is worried for himself when he leaves here as he is now homeless. Reminded him that  would return tomorrow, but that he should be thinking of alternate plans for housing (family) etc.  No PRNs required during shift. Will continue to monitor for safety and support. [FreeTextEntry1] :  \par

## 2022-07-19 ENCOUNTER — OFFICE VISIT (OUTPATIENT)
Dept: ORTHOPEDIC SURGERY | Age: 42
End: 2022-07-19
Payer: MEDICAID

## 2022-07-19 VITALS
DIASTOLIC BLOOD PRESSURE: 89 MMHG | BODY MASS INDEX: 32.6 KG/M2 | OXYGEN SATURATION: 98 % | HEIGHT: 73 IN | HEART RATE: 85 BPM | TEMPERATURE: 97.3 F | SYSTOLIC BLOOD PRESSURE: 134 MMHG | WEIGHT: 246 LBS

## 2022-07-19 DIAGNOSIS — G89.29 CHRONIC PAIN OF LEFT KNEE: Primary | ICD-10-CM

## 2022-07-19 DIAGNOSIS — M25.562 CHRONIC PAIN OF LEFT KNEE: Primary | ICD-10-CM

## 2022-07-19 DIAGNOSIS — S80.02XA CONTUSION OF LEFT KNEE, INITIAL ENCOUNTER: ICD-10-CM

## 2022-07-19 PROCEDURE — 73560 X-RAY EXAM OF KNEE 1 OR 2: CPT | Performed by: PHYSICIAN ASSISTANT

## 2022-07-19 PROCEDURE — 99203 OFFICE O/P NEW LOW 30 MIN: CPT | Performed by: PHYSICIAN ASSISTANT

## 2022-07-19 RX ORDER — TRAMADOL HYDROCHLORIDE 50 MG/1
50 TABLET ORAL
Qty: 28 TABLET | Refills: 0 | Status: SHIPPED | OUTPATIENT
Start: 2022-07-19 | End: 2022-07-26

## 2022-07-20 ENCOUNTER — TELEPHONE (OUTPATIENT)
Dept: ORTHOPEDIC SURGERY | Age: 42
End: 2022-07-20

## 2022-07-20 ENCOUNTER — HOSPITAL ENCOUNTER (OUTPATIENT)
Age: 42
Discharge: HOME | End: 2022-07-20
Attending: PHYSICIAN ASSISTANT
Payer: MEDICAID

## 2022-07-20 DIAGNOSIS — S80.02XA CONTUSION OF LEFT KNEE, INITIAL ENCOUNTER: ICD-10-CM

## 2022-07-20 PROCEDURE — 73721 MRI JNT OF LWR EXTRE W/O DYE: CPT

## 2022-07-20 NOTE — LETTER
NOTIFICATION RETURN TO WORK / SCHOOL    7/20/2022 2:01 PM    Mr. Lisa Barone  [facility] [address]      To Whom It May Concern:    Lisa Barone is currently under the care of [address] [place] [address] [facility]. Patient was treated and evaluated on July 19th, 2022. Patient will be returning after MRI is completed. If there are questions or concerns please have the patient contact our office.         Sincerely,      Loli Nascimento MD

## 2022-07-25 ENCOUNTER — OFFICE VISIT (OUTPATIENT)
Dept: ORTHOPEDIC SURGERY | Age: 42
End: 2022-07-25
Payer: MEDICAID

## 2022-07-25 VITALS — WEIGHT: 246 LBS | HEIGHT: 73 IN | TEMPERATURE: 97.8 F | BODY MASS INDEX: 32.6 KG/M2

## 2022-07-25 DIAGNOSIS — S86.812A RUPTURE OF LEFT PATELLAR TENDON, INITIAL ENCOUNTER: Primary | ICD-10-CM

## 2022-07-25 DIAGNOSIS — M70.42 PREPATELLAR BURSITIS OF LEFT KNEE: ICD-10-CM

## 2022-07-25 PROCEDURE — 99214 OFFICE O/P EST MOD 30 MIN: CPT | Performed by: PHYSICIAN ASSISTANT

## 2022-07-25 NOTE — PROGRESS NOTES
Odalys Agrawal  1980   Chief Complaint   Patient presents with    Results     MRI on Lt knee         HISTORY OF PRESENT ILLNESS  Odalys Agrawal is a 38 y.o. male who presents today for reevaluation of left knee and MRI review. Patient rates pain as 5/10 today. Pain has been present since he fell off a knee scooter while trying to ride it to work 3-4 weeks ago. He hit a crack and flew over the top. He landed directly on his knee. [facility] who put him in a knee immobilizer. Presents to the office today wearing knee immobilizer. He reports he has been previously diagnosed with osteoarthritis and is being followed by Rheumatology. Patient denies any fever, chills, chest pain, shortness of breath or calf pain. The remainder of the review of systems in negative. There are no new illness or injuries to report since last seen in the office. There are no changes to medications, allergies, family or social history. PHYSICAL EXAM:   Visit Vitals  Temp 97.8 °F (36.6 °C) (Temporal)   Ht 6' 1\" (1.854 m)   Wt 246 lb (111.6 kg)   BMI 32.46 kg/m²     The patient is a well-developed, well-nourished male   in no acute distress. The patient is alert and oriented times three. The patient is alert and oriented times three. Mood and affect are normal.  LYMPHATIC: lymph nodes are not enlarged and are within normal limits  SKIN: normal in color and non tender to palpation. There are no bruises or abrasions noted. NEUROLOGICAL: Motor sensory exam is within normal limits. Reflexes are equal bilaterally.  There is normal sensation to pinprick and light touch  MUSCULOSKELETAL:  Examination Left knee   Skin Intact   Range of motion    Effusion -   Medial joint line tenderness +   Lateral joint line tenderness -   Tenderness Pes Bursa -   Tenderness insertion MCL -   Tenderness insertion LCL -   Nashs +   Patella crepitus -   Patella grind -   Lachman -   Pivot shift -   Anterior drawer - Posterior drawer -   Varus stress -   Valgus stress -   Neurovascular Intact   Calf Swelling and Tenderness to Palpation -   Geoffrey's Test -   Hamstring Cord Tightness -   ttp patella; + swelling prepatellar bursa, +ttp patella tendon      IMAGING: MRI of left knee dated 7/20/2022 was reviewed and read by myself reveals:   IMPRESSION:  Motion degraded study. 1.  No evidence of acute fracture. 2.  Moderate prepatellar bursitis, likely hemorrhagic given history of recent  fall. 3.  Small partial-thickness interstitial tear at the patellar tendon origin (8  mm craniocaudad, approximately 50% thickness). 4.  Findings suggestive of patellar tendon-lateral femoral condyle friction  syndrome. Focal edema at the superolateral aspect of Hoffa's fat pad. Patella  heavenly. Borderline elevated TT-TG. 5.  Grade 1/2 patellofemoral compartment chondromalacia. 2 view xray of left knee taken in office on 7/19/22 read and reviewed by myself reveal no acute abnormalities    IMPRESSION:      ICD-10-CM ICD-9-CM    1. Rupture of left patellar tendon, initial encounter  S86.812A 844.8 REFERRAL TO PHYSICAL THERAPY      2. Prepatellar bursitis of left knee  M70.42 726.65 REFERRAL TO PHYSICAL THERAPY           PLAN:   1. Pt presents today with left knee pain due to an MRI-documented partial thickness patellar tendon tear and prepatellar bursitis. Was instructed to d/c with wearing knee immobilizer. I recommended he proceed with PT, was given order at last OV and was provided with new order in the office today. Was provided with work note with the following restrictions: No walking or standing for greater than 15 minutes at a time X 4 weeks. Risk factors include: n/a  2. No ultrasound exam indicated today  3. No cortisone injection indicated today   4. Yes Physical/Occupational Therapy indicated today  5. No diagnostic test indicated today:   6. No durable medical equipment indicated today  7. No referral indicated today   8.  No medications indicated today:   9.  No Narcotic indicated today       RTC 4 weeks with Dr. Ramon Shall      Scribed by Sharee Castellanos [address]) as dictated by NEHEMIAS Ley [address] and Spine Specialist

## 2022-07-25 NOTE — LETTER
NOTIFICATION RETURN TO WORK / SCHOOL    7/25/2022 3:38 PM    Mr. Parker Young  [address]      To Whom It May Concern:    Parker Young is currently under the care of [address] [place] [address] [facility]. He will have the following work restrictions: No walking or standing for greater than 15 minutes at a time X 4 weeks. If there are questions or concerns please have the patient contact our office.         Sincerely,      JACK Mccray

## 2022-09-15 ENCOUNTER — OFFICE VISIT (OUTPATIENT)
Dept: ORTHOPEDIC SURGERY | Age: 42
End: 2022-09-15

## 2022-09-15 VITALS
WEIGHT: 247 LBS | TEMPERATURE: 97.5 F | BODY MASS INDEX: 32.74 KG/M2 | HEART RATE: 85 BPM | OXYGEN SATURATION: 98 % | HEIGHT: 73 IN | RESPIRATION RATE: 14 BRPM

## 2022-09-15 DIAGNOSIS — S86.819A RUPTURE OF PATELLAR TENDON, UNSPECIFIED LATERALITY, INITIAL ENCOUNTER: Primary | ICD-10-CM

## 2022-09-15 PROCEDURE — 99213 OFFICE O/P EST LOW 20 MIN: CPT | Performed by: PHYSICIAN ASSISTANT

## 2022-09-15 NOTE — LETTER
NOTIFICATION RETURN TO WORK / SCHOOL    9/15/2022 10:23 AM    Mr. Al Menjivar  [address]      To Whom It May Concern:    Al Menjivar is currently under the care of [address] [place] [address] [facility]. He will return to work/school on: 09/16/2022    If there are questions or concerns please have the patient contact our office.         Sincerely,      JACK Moran

## 2022-09-15 NOTE — PROGRESS NOTES
Irma Johnson  1980   Chief Complaint   Patient presents with    Knee Pain     Left            HISTORY OF PRESENT ILLNESS  Irma Johnson is a 38 y.o. male who presents today for reevaluation of left knee. Patient rates pain as 7/10 today. Pain has been present since he fell off a knee scooter while trying to ride it to work 3-4 weeks ago. He hit a crack and flew over the top. He landed directly on his knee. He went to [facility] who put him in a knee immobilizer. Presents to the office today wearing knee immobilizer. He reports he has been previously diagnosed with osteoarthritis and is being followed by Rheumatology. Patient denies any fever, chills, chest pain, shortness of breath or calf pain. The remainder of the review of systems in negative. There are no new illness or injuries to report since last seen in the office. There are no changes to medications, allergies, family or social history. PHYSICAL EXAM:   Visit Vitals  Pulse 85   Temp 97.5 °F (36.4 °C) (Temporal)   Resp 14   Ht 6' 1\" (1.854 m)   Wt 247 lb (112 kg)   SpO2 98%   BMI 32.59 kg/m²       The patient is a well-developed, well-nourished male   in no acute distress. The patient is alert and oriented times three. The patient is alert and oriented times three. Mood and affect are normal.  LYMPHATIC: lymph nodes are not enlarged and are within normal limits  SKIN: normal in color and non tender to palpation. There are no bruises or abrasions noted. NEUROLOGICAL: Motor sensory exam is within normal limits. Reflexes are equal bilaterally.  There is normal sensation to pinprick and light touch  MUSCULOSKELETAL:  Examination Left knee   Skin Intact   Range of motion    Effusion -   Medial joint line tenderness +   Lateral joint line tenderness -   Tenderness Pes Bursa -   Tenderness insertion MCL -   Tenderness insertion LCL -   Nashs +   Patella crepitus -   Patella grind -   Lachman -   Pivot shift -   Anterior drawer -   Posterior drawer -   Varus stress -   Valgus stress -   Neurovascular Intact   Calf Swelling and Tenderness to Palpation -   Geoffrey's Test -   Hamstring Cord Tightness -   ttp patella; + swelling prepatellar bursa, +ttp patella tendon      IMAGING: MRI of left knee dated 7/20/2022 was reviewed and read by myself reveals:   IMPRESSION:  Motion degraded study. 1.  No evidence of acute fracture. 2.  Moderate prepatellar bursitis, likely hemorrhagic given history of recent  fall. 3.  Small partial-thickness interstitial tear at the patellar tendon origin (8  mm craniocaudad, approximately 50% thickness). 4.  Findings suggestive of patellar tendon-lateral femoral condyle friction  syndrome. Focal edema at the superolateral aspect of Hoffa's fat pad. Patella  heavenly. Borderline elevated TT-TG. 5.  Grade 1/2 patellofemoral compartment chondromalacia. 2 view xray of left knee taken in office on 7/19/22 read and reviewed by myself reveal no acute abnormalities    IMPRESSION:      ICD-10-CM ICD-9-CM    1. Rupture of patellar tendon, unspecified laterality, initial encounter  S86.819A 844.8 AMB SUPPLY ORDER             PLAN:   1. Pt presents today with left knee pain due to an MRI-documented partial thickness patellar tendon tear and prepatellar bursitis. Patient still having persistent pain despite conservative treatment. Discussed options including surgery. Patient like to hold off on surgery at this point. He will return when he is ready for surgery. We will order a wheelchair to help with his mobility while he is trying to obtain work. Risk factors include: n/a  2. No ultrasound exam indicated today  3. No cortisone injection indicated today   4. Yes Physical/Occupational Therapy indicated today  5. No diagnostic test indicated today:   6. Yes  durable medical equipment indicated today  7. No referral indicated today   8. No medications indicated today:   9.  No Narcotic indicated today       RTC NEHEMIAS Thomas and Spine Specialist

## (undated) DEVICE — ELASTIC BANDAGE 6": Brand: CARDINAL HEALTH

## (undated) DEVICE — BANDAGE,GAUZE,BULKEE II,4.5"X4.1YD,STRL: Brand: MEDLINE

## (undated) DEVICE — STRIP SKIN CLSR W0.25XL4IN WHT SPUNBOUND FBR NYL HI ADH

## (undated) DEVICE — SUTURE ETHLN SZ 4-0 L18IN NONABSORBABLE BLK L19MM PC-5 3/8 1894G

## (undated) DEVICE — FLEX ADVANTAGE 1500CC: Brand: FLEX ADVANTAGE

## (undated) DEVICE — SHOE POSTOP M MAN 9-11 UNIV FOAM TRICOT SEMI FLX SKID

## (undated) DEVICE — AIRLIFE™ NASAL OXYGEN CANNULA CURVED, NONFLARED TIP WITH 14 FOOT (4.3 M) CRUSH-RESISTANT TUBING, OVER-THE-EAR STYLE: Brand: AIRLIFE™

## (undated) DEVICE — STANDARD DRILL BIT , AO

## (undated) DEVICE — FLEX ADVANTAGE 3000CC: Brand: FLEX ADVANTAGE

## (undated) DEVICE — SPLINT ORTH W4INXL15FT FBRGLS PREPADDED H2O REPELLENT FELT

## (undated) DEVICE — 3M™ STERI-STRIP™ COMPOUND BENZOIN TINCTURE 40 BAGS/CARTON 4 CARTONS/CASE C1544: Brand: 3M™ STERI-STRIP™

## (undated) DEVICE — POSITIONING PIN

## (undated) DEVICE — PADDING,UNDERCAST,COTTON, 4"X4YD STERILE: Brand: MEDLINE

## (undated) DEVICE — GAUZE,SPONGE,4"X4",16PLY,STRL,LF,10/TRAY: Brand: MEDLINE

## (undated) DEVICE — SUTURE VCRL SZ 3-0 L27IN ABSRB UD L26MM SH 1/2 CIR J416H

## (undated) DEVICE — SUTURE VCRL SZ 2-0 L27IN ABSRB VLT L36MM CT-1 1/2 CIR J339H

## (undated) DEVICE — REM POLYHESIVE ADULT PATIENT RETURN ELECTRODE: Brand: VALLEYLAB

## (undated) DEVICE — THREE-QUARTER SHEET: Brand: CONVERTORS

## (undated) DEVICE — CUFF TRNQT AD W4IN 34IN CIRC SURG GEL SGL PRT BLDR PNEUMAT

## (undated) DEVICE — SUTURE ETHBND 5 L20IN NONABSORBABLE GRN LR L75MM 3/8 CIR B409T

## (undated) DEVICE — SHOE POSTOP M WOMAN 6-8 UNIV FOAM TRICOT SEMI FLX SKID

## (undated) DEVICE — Device

## (undated) DEVICE — BLANKET WRM AD W50XL85.8IN PACU FULL BODY FORC AIR

## (undated) DEVICE — INTENDED FOR TISSUE SEPARATION, AND OTHER PROCEDURES THAT REQUIRE A SHARP SURGICAL BLADE TO PUNCTURE OR CUT.: Brand: BARD-PARKER SAFETY BLADES SIZE 10, STERILE

## (undated) DEVICE — SPLINT CAST PLASTER 4X15FT -- DYNACAST

## (undated) DEVICE — DRESSING,GAUZE,XEROFORM,CURAD,1"X8",ST: Brand: CURAD

## (undated) DEVICE — MEDI-VAC NON-CONDUCTIVE SUCTION TUBING: Brand: CARDINAL HEALTH

## (undated) DEVICE — BANDAGE COBAN 4 IN COMPR W4INXL5YD FOAM COHESIVE QUIK STK SELF ADH SFT

## (undated) DEVICE — CATH IV SAFE STR 22GX1IN BLU -- PROTECTIV PLUS

## (undated) DEVICE — PREP SKN CHLRAPRP APL 26ML STR --

## (undated) DEVICE — BLADE ASSEMB CLP HAIR FINE --

## (undated) DEVICE — SUTURE VCRL SZ 4-0 L27IN ABSRB UD L26MM SH 1/2 CIR J415H

## (undated) DEVICE — CANNULATED COUNTERSINK

## (undated) DEVICE — GARMENT,MEDLINE,DVT,INT,CALF,MED, GEN2: Brand: MEDLINE

## (undated) DEVICE — SOLUTION IV 1000ML 0.9% SOD CHL

## (undated) DEVICE — SOLUTION IRRIG 1000ML H2O STRL BLT

## (undated) DEVICE — PACK PROCEDURE SURG MAJ W/ BASIN LF

## (undated) DEVICE — STOCKINETTE,IMPERVIOUS,12X48,STERILE: Brand: MEDLINE

## (undated) DEVICE — NEEDLE SPNL 22GA L3.5IN BLK HUB S STL REG WALL FIT STYL W/

## (undated) DEVICE — UNTHREADED GUIDE WIRE
Type: IMPLANTABLE DEVICE | Site: FOOT | Status: NON-FUNCTIONAL
Brand: FIXOS
Removed: 2020-12-29

## (undated) DEVICE — DRAPE,EXTREMITY,89X128,STERILE: Brand: MEDLINE

## (undated) DEVICE — CANNULATED DRILL

## (undated) DEVICE — LIGHT HANDLE: Brand: DEVON

## (undated) DEVICE — STERILE POLYISOPRENE POWDER-FREE SURGICAL GLOVES: Brand: PROTEXIS